# Patient Record
Sex: FEMALE | Race: BLACK OR AFRICAN AMERICAN | NOT HISPANIC OR LATINO
[De-identification: names, ages, dates, MRNs, and addresses within clinical notes are randomized per-mention and may not be internally consistent; named-entity substitution may affect disease eponyms.]

---

## 2021-05-07 PROBLEM — Z00.00 ENCOUNTER FOR PREVENTIVE HEALTH EXAMINATION: Status: ACTIVE | Noted: 2021-05-07

## 2021-05-10 ENCOUNTER — NON-APPOINTMENT (OUTPATIENT)
Age: 81
End: 2021-05-10

## 2021-05-11 ENCOUNTER — NON-APPOINTMENT (OUTPATIENT)
Age: 81
End: 2021-05-11

## 2021-05-11 ENCOUNTER — APPOINTMENT (OUTPATIENT)
Dept: GYNECOLOGIC ONCOLOGY | Facility: CLINIC | Age: 81
End: 2021-05-11
Payer: MEDICARE

## 2021-05-11 VITALS
HEART RATE: 77 BPM | BODY MASS INDEX: 27.97 KG/M2 | HEIGHT: 66 IN | SYSTOLIC BLOOD PRESSURE: 162 MMHG | WEIGHT: 174 LBS | DIASTOLIC BLOOD PRESSURE: 79 MMHG

## 2021-05-11 DIAGNOSIS — E11.9 TYPE 2 DIABETES MELLITUS W/OUT COMPLICATIONS: ICD-10-CM

## 2021-05-11 DIAGNOSIS — N89.8 OTHER SPECIFIED NONINFLAMMATORY DISORDERS OF VAGINA: ICD-10-CM

## 2021-05-11 DIAGNOSIS — E03.9 HYPOTHYROIDISM, UNSPECIFIED: ICD-10-CM

## 2021-05-11 DIAGNOSIS — Z78.9 OTHER SPECIFIED HEALTH STATUS: ICD-10-CM

## 2021-05-11 PROCEDURE — 99205 OFFICE O/P NEW HI 60 MIN: CPT

## 2021-05-11 PROCEDURE — 99072 ADDL SUPL MATRL&STAF TM PHE: CPT

## 2021-05-11 RX ORDER — METFORMIN HYDROCHLORIDE 625 MG/1
TABLET ORAL
Refills: 0 | Status: ACTIVE | COMMUNITY

## 2021-05-11 RX ORDER — ATORVASTATIN CALCIUM 80 MG/1
TABLET, FILM COATED ORAL
Refills: 0 | Status: ACTIVE | COMMUNITY

## 2021-05-11 RX ORDER — LEVOTHYROXINE SODIUM 137 UG/1
TABLET ORAL
Refills: 0 | Status: ACTIVE | COMMUNITY

## 2021-05-11 NOTE — DISCUSSION/SUMMARY
[Reviewed Clinical Lab Test(s)] : Results of clinical tests were reviewed. [Reviewed Radiology Report(s)] : Radiology reports were reviewed. [Discuss Tests w/Referring Providers] : Results of labs/radiology studies and the treatment recommendations were discussed with performing/referring physician. [Pre Op] : The differential diagnosis was discussed in detail. The indications, risks, benefits and alternatives were discussed. [unfilled] expressed an understanding of the treatment rationale and her questions were answered to her apparent satisfaction.

## 2021-05-11 NOTE — PHYSICAL EXAM
[Normal] : Recto-Vaginal Exam: Normal [Fully active, able to carry on all pre-disease performance without restriction] : Status 0 - Fully active, able to carry on all pre-disease performance without restriction [de-identified] : small laparoscopic scars; vertical 2 cm scar at suprapubic region [de-identified] : b/l upper extremity tattoos [de-identified] : watery blood tinged fluid pooling in posterior fornix (aspirated to confirm fluid creat c/w serum) [de-identified] : enlarged fibroid uterus 12 weeks [de-identified] : no rectovaginal nodularity

## 2021-05-11 NOTE — REVIEW OF SYSTEMS
[Negative] : Musculoskeletal [Diabetes] : diabetes mellitus [Hypothyroidism] : hypothyroidism [Abn Vag Bleeding] : abnormal vaginal bleeding [de-identified] : HTN

## 2021-05-11 NOTE — HISTORY OF PRESENT ILLNESS
[FreeTextEntry1] : Referred by (GYN) Dr. Gary\par PCP: Dr. Raymond\par \par Ms. Cleveland is a 80 year old  postmenopausal female, referred by Dr. 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 She denies pelvic pain/pressure. She denies bloating, abdominal distention or change in bowel or urinary habits.  She denies nausea/vomiting.  She has vaginal bleeding, but denies vaginal discharge.  She denies all other associated signs and symptoms.  She is here today to discuss further surgical management. \par \par HM\par Pap- n/a \par Mammo- n/a\par Colonoscopy- n/a

## 2021-05-11 NOTE — OB HISTORY
[Total Preg ___] : : [unfilled] [Full Term ___] : [unfilled] (full-term) [Abortions ___] : [unfilled] (abortions) [Living ___] : [unfilled] (living) [Vaginal ___] : [unfilled] vaginal delivery(s) [Menarche Age ____] : age at menarche was [unfilled] [Menopause  Age ____] : menopause occurred at age [unfilled]

## 2021-05-12 LAB
CANCER AG125 SERPL-ACNC: 10 U/ML
CEA SERPL-MCNC: 1.2 NG/ML
CREAT FLD-MCNC: 0.95 MG/DL

## 2021-05-13 ENCOUNTER — RESULT REVIEW (OUTPATIENT)
Age: 81
End: 2021-05-13

## 2021-05-13 ENCOUNTER — TRANSCRIPTION ENCOUNTER (OUTPATIENT)
Age: 81
End: 2021-05-13

## 2021-05-15 ENCOUNTER — APPOINTMENT (OUTPATIENT)
Dept: CT IMAGING | Facility: IMAGING CENTER | Age: 81
End: 2021-05-15
Payer: MEDICARE

## 2021-05-15 ENCOUNTER — OUTPATIENT (OUTPATIENT)
Dept: OUTPATIENT SERVICES | Facility: HOSPITAL | Age: 81
LOS: 1 days | End: 2021-05-15
Payer: COMMERCIAL

## 2021-05-15 DIAGNOSIS — N89.8 OTHER SPECIFIED NONINFLAMMATORY DISORDERS OF VAGINA: ICD-10-CM

## 2021-05-15 DIAGNOSIS — C54.1 MALIGNANT NEOPLASM OF ENDOMETRIUM: ICD-10-CM

## 2021-05-15 PROCEDURE — 82565 ASSAY OF CREATININE: CPT

## 2021-05-15 PROCEDURE — 71260 CT THORAX DX C+: CPT

## 2021-05-15 PROCEDURE — 74177 CT ABD & PELVIS W/CONTRAST: CPT | Mod: 26,MG

## 2021-05-15 PROCEDURE — 74177 CT ABD & PELVIS W/CONTRAST: CPT

## 2021-05-15 PROCEDURE — G1004: CPT

## 2021-05-15 PROCEDURE — 71260 CT THORAX DX C+: CPT | Mod: 26,MG

## 2021-05-17 ENCOUNTER — APPOINTMENT (OUTPATIENT)
Dept: SURGICAL ONCOLOGY | Facility: CLINIC | Age: 81
End: 2021-05-17
Payer: MEDICARE

## 2021-05-17 ENCOUNTER — OUTPATIENT (OUTPATIENT)
Dept: OUTPATIENT SERVICES | Facility: HOSPITAL | Age: 81
LOS: 1 days | End: 2021-05-17
Payer: MEDICARE

## 2021-05-17 ENCOUNTER — LABORATORY RESULT (OUTPATIENT)
Age: 81
End: 2021-05-17

## 2021-05-17 ENCOUNTER — APPOINTMENT (OUTPATIENT)
Dept: DISASTER EMERGENCY | Facility: CLINIC | Age: 81
End: 2021-05-17

## 2021-05-17 VITALS
HEIGHT: 67 IN | HEART RATE: 62 BPM | DIASTOLIC BLOOD PRESSURE: 80 MMHG | TEMPERATURE: 98 F | SYSTOLIC BLOOD PRESSURE: 140 MMHG | RESPIRATION RATE: 16 BRPM | WEIGHT: 169.98 LBS | OXYGEN SATURATION: 98 %

## 2021-05-17 DIAGNOSIS — C54.1 MALIGNANT NEOPLASM OF ENDOMETRIUM: ICD-10-CM

## 2021-05-17 DIAGNOSIS — E03.9 HYPOTHYROIDISM, UNSPECIFIED: ICD-10-CM

## 2021-05-17 DIAGNOSIS — E11.9 TYPE 2 DIABETES MELLITUS WITHOUT COMPLICATIONS: ICD-10-CM

## 2021-05-17 DIAGNOSIS — I10 ESSENTIAL (PRIMARY) HYPERTENSION: ICD-10-CM

## 2021-05-17 DIAGNOSIS — Z98.51 TUBAL LIGATION STATUS: Chronic | ICD-10-CM

## 2021-05-17 LAB
A1C WITH ESTIMATED AVERAGE GLUCOSE RESULT: 6.7 % — HIGH (ref 4–5.6)
ALBUMIN SERPL ELPH-MCNC: 3.9 G/DL — SIGNIFICANT CHANGE UP (ref 3.3–5)
ALP SERPL-CCNC: 135 U/L — HIGH (ref 40–120)
ALT FLD-CCNC: 9 U/L — SIGNIFICANT CHANGE UP (ref 4–33)
ANION GAP SERPL CALC-SCNC: 10 MMOL/L — SIGNIFICANT CHANGE UP (ref 7–14)
AST SERPL-CCNC: 16 U/L — SIGNIFICANT CHANGE UP (ref 4–32)
BILIRUB SERPL-MCNC: 0.6 MG/DL — SIGNIFICANT CHANGE UP (ref 0.2–1.2)
BLD GP AB SCN SERPL QL: NEGATIVE — SIGNIFICANT CHANGE UP
BUN SERPL-MCNC: 10 MG/DL — SIGNIFICANT CHANGE UP (ref 7–23)
CALCIUM SERPL-MCNC: 10.2 MG/DL — SIGNIFICANT CHANGE UP (ref 8.4–10.5)
CHLORIDE SERPL-SCNC: 101 MMOL/L — SIGNIFICANT CHANGE UP (ref 98–107)
CO2 SERPL-SCNC: 28 MMOL/L — SIGNIFICANT CHANGE UP (ref 22–31)
CREAT SERPL-MCNC: 0.94 MG/DL — SIGNIFICANT CHANGE UP (ref 0.5–1.3)
ESTIMATED AVERAGE GLUCOSE: 146 MG/DL — HIGH (ref 68–114)
GLUCOSE SERPL-MCNC: 132 MG/DL — HIGH (ref 70–99)
HCT VFR BLD CALC: 43 % — SIGNIFICANT CHANGE UP (ref 34.5–45)
HGB BLD-MCNC: 13 G/DL — SIGNIFICANT CHANGE UP (ref 11.5–15.5)
MCHC RBC-ENTMCNC: 26.5 PG — LOW (ref 27–34)
MCHC RBC-ENTMCNC: 30.2 GM/DL — LOW (ref 32–36)
MCV RBC AUTO: 87.8 FL — SIGNIFICANT CHANGE UP (ref 80–100)
NRBC # BLD: 0 /100 WBCS — SIGNIFICANT CHANGE UP
NRBC # FLD: 0 K/UL — SIGNIFICANT CHANGE UP
PLATELET # BLD AUTO: 377 K/UL — SIGNIFICANT CHANGE UP (ref 150–400)
POTASSIUM SERPL-MCNC: 3.6 MMOL/L — SIGNIFICANT CHANGE UP (ref 3.5–5.3)
POTASSIUM SERPL-SCNC: 3.6 MMOL/L — SIGNIFICANT CHANGE UP (ref 3.5–5.3)
PROT SERPL-MCNC: 7.8 G/DL — SIGNIFICANT CHANGE UP (ref 6–8.3)
RBC # BLD: 4.9 M/UL — SIGNIFICANT CHANGE UP (ref 3.8–5.2)
RBC # FLD: 14 % — SIGNIFICANT CHANGE UP (ref 10.3–14.5)
RH IG SCN BLD-IMP: POSITIVE — SIGNIFICANT CHANGE UP
SODIUM SERPL-SCNC: 139 MMOL/L — SIGNIFICANT CHANGE UP (ref 135–145)
WBC # BLD: 8.07 K/UL — SIGNIFICANT CHANGE UP (ref 3.8–10.5)
WBC # FLD AUTO: 8.07 K/UL — SIGNIFICANT CHANGE UP (ref 3.8–10.5)

## 2021-05-17 PROCEDURE — 99443: CPT | Mod: 95

## 2021-05-17 PROCEDURE — 93010 ELECTROCARDIOGRAM REPORT: CPT

## 2021-05-17 RX ORDER — SODIUM CHLORIDE 9 MG/ML
1000 INJECTION, SOLUTION INTRAVENOUS
Refills: 0 | Status: DISCONTINUED | OUTPATIENT
Start: 2021-05-20 | End: 2021-05-20

## 2021-05-17 RX ORDER — CHLORHEXIDINE GLUCONATE 213 G/1000ML
1 SOLUTION TOPICAL ONCE
Refills: 0 | Status: COMPLETED | OUTPATIENT
Start: 2021-05-20 | End: 2021-05-20

## 2021-05-17 NOTE — H&P PST ADULT - HISTORY OF PRESENT ILLNESS
80 y.o .female , LMP 48 y.o., c/o postmenopausal bleeding since 2021, denies pelvic pain, cramping, s/p transvaginal ultrasound, preop diagnosis malignant neoplasm of endometrium, scheduled for robotic assisted total laparoscopic hysterectomy, bilateral salpingo oophorectomy, sentinel lymph node mapping with pelvic papa aortic lymph node sampling, omental biopsy, possible open 80 y.o .female , LMP 48 y.o., c/o postmenopausal bleeding since 2021, weight loss 10 lbs, denies pelvic pain, cramping, fatigue, s/p transvaginal ultrasound, MRI, biopsy, preop diagnosis malignant neoplasm of endometrium, scheduled for robotic assisted total laparoscopic hysterectomy, bilateral salpingo oophorectomy, sentinel lymph node mapping with pelvic papa aortic lymph node sampling, omental biopsy, possible open

## 2021-05-17 NOTE — H&P PST ADULT - NSICDXFAMILYHX_GEN_ALL_CORE_FT
FAMILY HISTORY:  Sibling  Still living? Unknown  FH: HTN (hypertension), Age at diagnosis: Age Unknown

## 2021-05-17 NOTE — H&P PST ADULT - ATTENDING COMMENTS
Workup of endometrial cancer demonstrated a large ascending colon mass on CT.  Colonoscopy confirmed a malignant appearing mass in the ascending colon - tattooed distally by GI.  Biopsy demonstrates a tubular adenoma, too large to be endoscopically removed.  Given large size and increased risk of malignant transformation, possible occult cancer, partial colectomy is indicated for complete extirpation and regional ally sampling in the event of cancer diagnosis.  Will plan combined procedure today with Dr. Cox.  Operative indications, risks/benefits explained to patient.  She expresses understanding and has signed informed consent.

## 2021-05-17 NOTE — H&P PST ADULT - NSICDXPASTMEDICALHX_GEN_ALL_CORE_FT
PAST MEDICAL HISTORY:  HLD (hyperlipidemia)     HTN (hypertension)     Hypothyroidism     Malignant neoplasm of endometrium      PAST MEDICAL HISTORY:  Diabetes mellitus     HLD (hyperlipidemia)     HTN (hypertension)     Hypothyroidism     Malignant neoplasm of endometrium     Thyroid nodule

## 2021-05-17 NOTE — H&P PST ADULT - NSICDXPROBLEM_GEN_ALL_CORE_FT
PROBLEM DIAGNOSES  Problem: Malignant neoplasm of endometrium  Assessment and Plan: pt scheduled for robotic assisted total laparoscopic hysterectomy, bilateral salpingo oophorectomy, sentinel lymph node mapping with pelvic papa aortic lymph node sampling, omental biopsy, possible open on 05/20/21  Preop instructions provided. Pt verbalized understanding.   Pepcid for GI prophylaxis with written and verbal instruction provided    written and verbal instructions with teach back on chlorhexidine shampoo provided,  pt verbalized understanding with returned demonstration   s/p med eval as per surgeon's request, copy requested  pt confirmed has appt for COVID test scheduled 3 days preop    Problem: HTN (hypertension)  Assessment and Plan: pt instructed to take toprol, norvasc with a sip of water on the morning of the surgery     Problem: Hypothyroidism  Assessment and Plan: pt instructed to take levothyroxine with a sip of water on the morning of the surgery        PROBLEM DIAGNOSES  Problem: Malignant neoplasm of endometrium  Assessment and Plan: pt scheduled for robotic assisted total laparoscopic hysterectomy, bilateral salpingo oophorectomy, sentinel lymph node mapping with pelvic papa aortic lymph node sampling, omental biopsy, possible open on 05/20/21  Preop instructions provided. Pt verbalized understanding.   Pepcid for GI prophylaxis with written and verbal instruction provided    written and verbal instructions with teach back on chlorhexidine shampoo provided,  pt verbalized understanding with returned demonstration   s/p med eval as per surgeon's request, copy requested  pt confirmed has appt for COVID test scheduled 3 days preop    Problem: HTN (hypertension)  Assessment and Plan: pt instructed to take toprol, norvasc with a sip of water on the morning of the surgery     Problem: Hypothyroidism  Assessment and Plan: pt instructed to take levothyroxine with a sip of water on the morning of the surgery     Problem: Diabetes mellitus  Assessment and Plan: OR booking notified  pt instructed to hold metformin the night before and on the morning of the surgery

## 2021-05-17 NOTE — H&P PST ADULT - NSANTHOSAYNRD_GEN_A_CORE
No. MAGO screening performed.  STOP BANG Legend: 0-2 = LOW Risk; 3-4 = INTERMEDIATE Risk; 5-8 = HIGH Risk

## 2021-05-17 NOTE — H&P PST ADULT - LIVES WITH, PROFILE
Spoke to Sallie with [facility] and requesting pt's medication for Furosemide 40mg 2 tabs twice daily changed to Furosemide 80mg BID. Pls advise.    alone

## 2021-05-18 ENCOUNTER — RESULT REVIEW (OUTPATIENT)
Age: 81
End: 2021-05-18

## 2021-05-18 ENCOUNTER — OUTPATIENT (OUTPATIENT)
Dept: OUTPATIENT SERVICES | Facility: HOSPITAL | Age: 81
LOS: 1 days | End: 2021-05-18
Payer: COMMERCIAL

## 2021-05-18 DIAGNOSIS — Z98.51 TUBAL LIGATION STATUS: Chronic | ICD-10-CM

## 2021-05-18 DIAGNOSIS — C80.1 MALIGNANT (PRIMARY) NEOPLASM, UNSPECIFIED: ICD-10-CM

## 2021-05-18 PROBLEM — C54.1 MALIGNANT NEOPLASM OF ENDOMETRIUM: Chronic | Status: ACTIVE | Noted: 2021-05-17

## 2021-05-18 PROBLEM — I10 ESSENTIAL (PRIMARY) HYPERTENSION: Chronic | Status: ACTIVE | Noted: 2021-05-17

## 2021-05-18 PROBLEM — E03.9 HYPOTHYROIDISM, UNSPECIFIED: Chronic | Status: ACTIVE | Noted: 2021-05-17

## 2021-05-18 PROBLEM — E78.5 HYPERLIPIDEMIA, UNSPECIFIED: Chronic | Status: ACTIVE | Noted: 2021-05-17

## 2021-05-18 PROBLEM — E04.1 NONTOXIC SINGLE THYROID NODULE: Chronic | Status: ACTIVE | Noted: 2021-05-17

## 2021-05-18 PROBLEM — E11.9 TYPE 2 DIABETES MELLITUS WITHOUT COMPLICATIONS: Chronic | Status: ACTIVE | Noted: 2021-05-17

## 2021-05-18 PROCEDURE — 88321 CONSLTJ&REPRT SLD PREP ELSWR: CPT

## 2021-05-19 ENCOUNTER — NON-APPOINTMENT (OUTPATIENT)
Age: 81
End: 2021-05-19

## 2021-05-19 ENCOUNTER — FORM ENCOUNTER (OUTPATIENT)
Age: 81
End: 2021-05-19

## 2021-05-19 ENCOUNTER — TRANSCRIPTION ENCOUNTER (OUTPATIENT)
Age: 81
End: 2021-05-19

## 2021-05-19 LAB — SURGICAL PATHOLOGY STUDY: SIGNIFICANT CHANGE UP

## 2021-05-19 NOTE — ASU PATIENT PROFILE, ADULT - PMH
Diabetes mellitus    HLD (hyperlipidemia)    HTN (hypertension)    Hypothyroidism    Malignant neoplasm of endometrium    Thyroid nodule

## 2021-05-20 ENCOUNTER — RESULT REVIEW (OUTPATIENT)
Age: 81
End: 2021-05-20

## 2021-05-20 ENCOUNTER — INPATIENT (INPATIENT)
Facility: HOSPITAL | Age: 81
LOS: 2 days | Discharge: HOME | End: 2021-05-23
Attending: OBSTETRICS & GYNECOLOGY | Admitting: OBSTETRICS & GYNECOLOGY
Payer: MEDICARE

## 2021-05-20 ENCOUNTER — APPOINTMENT (OUTPATIENT)
Dept: SURGICAL ONCOLOGY | Facility: HOSPITAL | Age: 81
End: 2021-05-20

## 2021-05-20 ENCOUNTER — APPOINTMENT (OUTPATIENT)
Dept: GYNECOLOGIC ONCOLOGY | Facility: HOSPITAL | Age: 81
End: 2021-05-20

## 2021-05-20 VITALS
WEIGHT: 169.98 LBS | HEIGHT: 67 IN | TEMPERATURE: 99 F | DIASTOLIC BLOOD PRESSURE: 59 MMHG | SYSTOLIC BLOOD PRESSURE: 133 MMHG | HEART RATE: 61 BPM | OXYGEN SATURATION: 99 % | RESPIRATION RATE: 14 BRPM

## 2021-05-20 DIAGNOSIS — Z98.51 TUBAL LIGATION STATUS: Chronic | ICD-10-CM

## 2021-05-20 DIAGNOSIS — C54.1 MALIGNANT NEOPLASM OF ENDOMETRIUM: ICD-10-CM

## 2021-05-20 LAB
ANION GAP SERPL CALC-SCNC: 11 MMOL/L — SIGNIFICANT CHANGE UP (ref 7–14)
BASOPHILS # BLD AUTO: 0.03 K/UL — SIGNIFICANT CHANGE UP (ref 0–0.2)
BASOPHILS NFR BLD AUTO: 0.2 % — SIGNIFICANT CHANGE UP (ref 0–2)
BUN SERPL-MCNC: 8 MG/DL — SIGNIFICANT CHANGE UP (ref 7–23)
CALCIUM SERPL-MCNC: 10 MG/DL — SIGNIFICANT CHANGE UP (ref 8.4–10.5)
CHLORIDE SERPL-SCNC: 103 MMOL/L — SIGNIFICANT CHANGE UP (ref 98–107)
CO2 SERPL-SCNC: 26 MMOL/L — SIGNIFICANT CHANGE UP (ref 22–31)
CREAT SERPL-MCNC: 0.82 MG/DL — SIGNIFICANT CHANGE UP (ref 0.5–1.3)
EOSINOPHIL # BLD AUTO: 0 K/UL — SIGNIFICANT CHANGE UP (ref 0–0.5)
EOSINOPHIL NFR BLD AUTO: 0 % — SIGNIFICANT CHANGE UP (ref 0–6)
GLUCOSE BLDC GLUCOMTR-MCNC: 109 MG/DL — HIGH (ref 70–99)
GLUCOSE BLDC GLUCOMTR-MCNC: 140 MG/DL — HIGH (ref 70–99)
GLUCOSE BLDC GLUCOMTR-MCNC: 142 MG/DL — HIGH (ref 70–99)
GLUCOSE SERPL-MCNC: 133 MG/DL — HIGH (ref 70–99)
HCT VFR BLD CALC: 41.3 % — SIGNIFICANT CHANGE UP (ref 34.5–45)
HGB BLD-MCNC: 12.3 G/DL — SIGNIFICANT CHANGE UP (ref 11.5–15.5)
IANC: 12.71 K/UL — HIGH (ref 1.5–8.5)
IMM GRANULOCYTES NFR BLD AUTO: 0.3 % — SIGNIFICANT CHANGE UP (ref 0–1.5)
LYMPHOCYTES # BLD AUTO: 1.19 K/UL — SIGNIFICANT CHANGE UP (ref 1–3.3)
LYMPHOCYTES # BLD AUTO: 8 % — LOW (ref 13–44)
MAGNESIUM SERPL-MCNC: 2 MG/DL — SIGNIFICANT CHANGE UP (ref 1.6–2.6)
MCHC RBC-ENTMCNC: 26.7 PG — LOW (ref 27–34)
MCHC RBC-ENTMCNC: 29.8 GM/DL — LOW (ref 32–36)
MCV RBC AUTO: 89.8 FL — SIGNIFICANT CHANGE UP (ref 80–100)
MONOCYTES # BLD AUTO: 0.88 K/UL — SIGNIFICANT CHANGE UP (ref 0–0.9)
MONOCYTES NFR BLD AUTO: 5.9 % — SIGNIFICANT CHANGE UP (ref 2–14)
NEUTROPHILS # BLD AUTO: 12.71 K/UL — HIGH (ref 1.8–7.4)
NEUTROPHILS NFR BLD AUTO: 85.6 % — HIGH (ref 43–77)
NRBC # BLD: 0 /100 WBCS — SIGNIFICANT CHANGE UP
NRBC # FLD: 0 K/UL — SIGNIFICANT CHANGE UP
PHOSPHATE SERPL-MCNC: 3.7 MG/DL — SIGNIFICANT CHANGE UP (ref 2.5–4.5)
PLATELET # BLD AUTO: 341 K/UL — SIGNIFICANT CHANGE UP (ref 150–400)
POTASSIUM SERPL-MCNC: 4.4 MMOL/L — SIGNIFICANT CHANGE UP (ref 3.5–5.3)
POTASSIUM SERPL-SCNC: 4.4 MMOL/L — SIGNIFICANT CHANGE UP (ref 3.5–5.3)
RBC # BLD: 4.6 M/UL — SIGNIFICANT CHANGE UP (ref 3.8–5.2)
RBC # FLD: 13.8 % — SIGNIFICANT CHANGE UP (ref 10.3–14.5)
RH IG SCN BLD-IMP: POSITIVE — SIGNIFICANT CHANGE UP
SODIUM SERPL-SCNC: 140 MMOL/L — SIGNIFICANT CHANGE UP (ref 135–145)
WBC # BLD: 14.85 K/UL — HIGH (ref 3.8–10.5)
WBC # FLD AUTO: 14.85 K/UL — HIGH (ref 3.8–10.5)

## 2021-05-20 PROCEDURE — S2900 ROBOTIC SURGICAL SYSTEM: CPT | Mod: NC

## 2021-05-20 PROCEDURE — 88307 TISSUE EXAM BY PATHOLOGIST: CPT | Mod: 26

## 2021-05-20 PROCEDURE — 88309 TISSUE EXAM BY PATHOLOGIST: CPT | Mod: 26

## 2021-05-20 PROCEDURE — 88305 TISSUE EXAM BY PATHOLOGIST: CPT | Mod: 26

## 2021-05-20 PROCEDURE — 38570 LAPAROSCOPY LYMPH NODE BIOP: CPT | Mod: GC

## 2021-05-20 PROCEDURE — 38900 IO MAP OF SENT LYMPH NODE: CPT | Mod: 50

## 2021-05-20 PROCEDURE — 88311 DECALCIFY TISSUE: CPT | Mod: 26

## 2021-05-20 PROCEDURE — 88112 CYTOPATH CELL ENHANCE TECH: CPT | Mod: 26

## 2021-05-20 PROCEDURE — 49905 OMENTAL FLAP INTRA-ABDOM: CPT

## 2021-05-20 PROCEDURE — 58573 TLH W/T/O UTERUS OVER 250 G: CPT | Mod: GC

## 2021-05-20 PROCEDURE — 88360 TUMOR IMMUNOHISTOCHEM/MANUAL: CPT | Mod: 26

## 2021-05-20 PROCEDURE — 44204 LAPARO PARTIAL COLECTOMY: CPT

## 2021-05-20 PROCEDURE — 88342 IMHCHEM/IMCYTCHM 1ST ANTB: CPT | Mod: 26,59

## 2021-05-20 PROCEDURE — 88341 IMHCHEM/IMCYTCHM EA ADD ANTB: CPT | Mod: 26,59

## 2021-05-20 RX ORDER — SODIUM CHLORIDE 9 MG/ML
1000 INJECTION, SOLUTION INTRAVENOUS
Refills: 0 | Status: DISCONTINUED | OUTPATIENT
Start: 2021-05-20 | End: 2021-05-20

## 2021-05-20 RX ORDER — HEPARIN SODIUM 5000 [USP'U]/ML
5000 INJECTION INTRAVENOUS; SUBCUTANEOUS EVERY 8 HOURS
Refills: 0 | Status: DISCONTINUED | OUTPATIENT
Start: 2021-05-20 | End: 2021-05-21

## 2021-05-20 RX ORDER — SODIUM CHLORIDE 9 MG/ML
1000 INJECTION, SOLUTION INTRAVENOUS
Refills: 0 | Status: DISCONTINUED | OUTPATIENT
Start: 2021-05-20 | End: 2021-05-22

## 2021-05-20 RX ORDER — ACETAMINOPHEN 500 MG
1000 TABLET ORAL ONCE
Refills: 0 | Status: COMPLETED | OUTPATIENT
Start: 2021-05-21 | End: 2021-05-21

## 2021-05-20 RX ORDER — DEXTROSE 50 % IN WATER 50 %
15 SYRINGE (ML) INTRAVENOUS ONCE
Refills: 0 | Status: DISCONTINUED | OUTPATIENT
Start: 2021-05-20 | End: 2021-05-20

## 2021-05-20 RX ORDER — INSULIN LISPRO 100/ML
VIAL (ML) SUBCUTANEOUS AT BEDTIME
Refills: 0 | Status: DISCONTINUED | OUTPATIENT
Start: 2021-05-20 | End: 2021-05-20

## 2021-05-20 RX ORDER — ACETAMINOPHEN 500 MG
1000 TABLET ORAL ONCE
Refills: 0 | Status: DISCONTINUED | OUTPATIENT
Start: 2021-05-21 | End: 2021-05-21

## 2021-05-20 RX ORDER — DEXTROSE 50 % IN WATER 50 %
15 SYRINGE (ML) INTRAVENOUS ONCE
Refills: 0 | Status: DISCONTINUED | OUTPATIENT
Start: 2021-05-20 | End: 2021-05-22

## 2021-05-20 RX ORDER — HYDROMORPHONE HYDROCHLORIDE 2 MG/ML
0.5 INJECTION INTRAMUSCULAR; INTRAVENOUS; SUBCUTANEOUS
Refills: 0 | Status: DISCONTINUED | OUTPATIENT
Start: 2021-05-20 | End: 2021-05-20

## 2021-05-20 RX ORDER — ONDANSETRON 8 MG/1
4 TABLET, FILM COATED ORAL EVERY 8 HOURS
Refills: 0 | Status: DISCONTINUED | OUTPATIENT
Start: 2021-05-20 | End: 2021-05-21

## 2021-05-20 RX ORDER — INSULIN LISPRO 100/ML
VIAL (ML) SUBCUTANEOUS EVERY 6 HOURS
Refills: 0 | Status: DISCONTINUED | OUTPATIENT
Start: 2021-05-20 | End: 2021-05-22

## 2021-05-20 RX ORDER — GLUCAGON INJECTION, SOLUTION 0.5 MG/.1ML
1 INJECTION, SOLUTION SUBCUTANEOUS ONCE
Refills: 0 | Status: DISCONTINUED | OUTPATIENT
Start: 2021-05-20 | End: 2021-05-20

## 2021-05-20 RX ORDER — GLUCAGON INJECTION, SOLUTION 0.5 MG/.1ML
1 INJECTION, SOLUTION SUBCUTANEOUS ONCE
Refills: 0 | Status: DISCONTINUED | OUTPATIENT
Start: 2021-05-20 | End: 2021-05-22

## 2021-05-20 RX ORDER — DEXTROSE 50 % IN WATER 50 %
25 SYRINGE (ML) INTRAVENOUS ONCE
Refills: 0 | Status: DISCONTINUED | OUTPATIENT
Start: 2021-05-20 | End: 2021-05-20

## 2021-05-20 RX ORDER — METOPROLOL TARTRATE 50 MG
5 TABLET ORAL EVERY 6 HOURS
Refills: 0 | Status: DISCONTINUED | OUTPATIENT
Start: 2021-05-20 | End: 2021-05-21

## 2021-05-20 RX ORDER — DEXTROSE 50 % IN WATER 50 %
12.5 SYRINGE (ML) INTRAVENOUS ONCE
Refills: 0 | Status: DISCONTINUED | OUTPATIENT
Start: 2021-05-20 | End: 2021-05-22

## 2021-05-20 RX ORDER — PANTOPRAZOLE SODIUM 20 MG/1
40 TABLET, DELAYED RELEASE ORAL ONCE
Refills: 0 | Status: COMPLETED | OUTPATIENT
Start: 2021-05-20 | End: 2021-05-20

## 2021-05-20 RX ORDER — DEXTROSE 50 % IN WATER 50 %
25 SYRINGE (ML) INTRAVENOUS ONCE
Refills: 0 | Status: DISCONTINUED | OUTPATIENT
Start: 2021-05-20 | End: 2021-05-22

## 2021-05-20 RX ORDER — INSULIN LISPRO 100/ML
VIAL (ML) SUBCUTANEOUS
Refills: 0 | Status: DISCONTINUED | OUTPATIENT
Start: 2021-05-20 | End: 2021-05-20

## 2021-05-20 RX ORDER — DEXTROSE 50 % IN WATER 50 %
12.5 SYRINGE (ML) INTRAVENOUS ONCE
Refills: 0 | Status: DISCONTINUED | OUTPATIENT
Start: 2021-05-20 | End: 2021-05-20

## 2021-05-20 RX ORDER — ACETAMINOPHEN 500 MG
1000 TABLET ORAL ONCE
Refills: 0 | Status: COMPLETED | OUTPATIENT
Start: 2021-05-20 | End: 2021-05-20

## 2021-05-20 RX ORDER — ONDANSETRON 8 MG/1
4 TABLET, FILM COATED ORAL
Refills: 0 | Status: DISCONTINUED | OUTPATIENT
Start: 2021-05-20 | End: 2021-05-20

## 2021-05-20 RX ORDER — SODIUM CHLORIDE 9 MG/ML
1000 INJECTION, SOLUTION INTRAVENOUS
Refills: 0 | Status: DISCONTINUED | OUTPATIENT
Start: 2021-05-20 | End: 2021-05-21

## 2021-05-20 RX ADMIN — ONDANSETRON 4 MILLIGRAM(S): 8 TABLET, FILM COATED ORAL at 22:16

## 2021-05-20 RX ADMIN — SODIUM CHLORIDE 125 MILLILITER(S): 9 INJECTION, SOLUTION INTRAVENOUS at 17:02

## 2021-05-20 RX ADMIN — PANTOPRAZOLE SODIUM 40 MILLIGRAM(S): 20 TABLET, DELAYED RELEASE ORAL at 17:00

## 2021-05-20 RX ADMIN — SODIUM CHLORIDE 125 MILLILITER(S): 9 INJECTION, SOLUTION INTRAVENOUS at 22:16

## 2021-05-20 RX ADMIN — HEPARIN SODIUM 5000 UNIT(S): 5000 INJECTION INTRAVENOUS; SUBCUTANEOUS at 17:00

## 2021-05-20 RX ADMIN — Medication 400 MILLIGRAM(S): at 21:07

## 2021-05-20 NOTE — ASU PREOP CHECKLIST - SELECT TESTS ORDERED
142/CBC/CMP/Type and Cross/Type and Screen/EKG/POCT Blood Glucose 142/CBC/CMP/Type and Cross/Type and Screen/EKG/POCT Blood Glucose/COVID-19

## 2021-05-20 NOTE — BRIEF OPERATIVE NOTE - OPERATION/FINDINGS
EUA: mobile enlarged fibroid uterus, nonpalpable adnexa b/l, normal rectovaginal exam w/o nodularity  LSC: hemostatic entry site, upper abdominal survey with normal appearing liver edge, falciform, omentum; normal appearing appendix; pelvic survey with normal ovaries and fallopian tubes bilaterally; tumor implant noted on posterior uterine wall; enlarged left common iliac lymph nodes; right obturator lymph node malignant EUA: mobile enlarged fibroid uterus, nonpalpable adnexa b/l, normal rectovaginal exam w/o nodularity  LSC: hemostatic entry site, upper abdominal survey with normal appearing liver edge, falciform, omentum; normal appearing appendix; pelvic survey with normal ovaries and fallopian tubes bilaterally; tumor implant noted on posterior uterine wall; enlarged left common iliac lymph nodes; right obturator lymph node malignant  RA right hemicolectomy w/Dr. Reaves 2/2 right colonic mass  Mini-Pfannenstiel for removal of fibroid and right colon

## 2021-05-20 NOTE — CHART NOTE - NSCHARTNOTEFT_GEN_A_CORE
PROCEDURE: Robot assisted right hemicolectomy    SUBJECTIVE:   Pt seen and examined at bedside. Pt s/p robot assisted RHC (and MAGO BSO by gyn onc). Pt tolerated the procedure well. Pt laying in bed comfortably. Pt NPO. No nausea, vomiting, chest pain, SOB, fever, chills. Renae in place      Vital Signs Last 24 Hrs  T(C): 36.5 (20 May 2021 20:45), Max: 37.1 (20 May 2021 06:22)  T(F): 97.7 (20 May 2021 20:45), Max: 98.8 (20 May 2021 06:22)  HR: 59 (20 May 2021 20:45) (50 - 68)  BP: 144/57 (20 May 2021 18:40) (114/52 - 144/57)  BP(mean): 71 (20 May 2021 17:00) (67 - 77)  RR: 16 (20 May 2021 20:45) (11 - 22)  SpO2: 98% (20 May 2021 20:45) (92% - 99%)      PHYSICAL EXAM:  Constitutional: NAD, laying in bed  Neuro: AAOx3  Respiratory: breathing comfortably  Gastrointestinal: Abdomen soft, non distended, appropriately tender, dressing c/d/i, binder in place  : batool        I&O's Summary    20 May 2021 07:01  -  20 May 2021 20:57  --------------------------------------------------------  IN: 500 mL / OUT: 160 mL / NET: 340 mL      I&O's Detail    20 May 2021 07:01  -  20 May 2021 20:57  --------------------------------------------------------  IN:    Lactated Ringers: 500 mL  Total IN: 500 mL    OUT:    Indwelling Catheter - Urethral (mL): 160 mL  Total OUT: 160 mL    Total NET: 340 mL          MEDICATIONS  (STANDING):  acetaminophen  IVPB .. 1000 milliGRAM(s) IV Intermittent once  dextrose 40% Gel 15 Gram(s) Oral once  dextrose 5%. 1000 milliLiter(s) (50 mL/Hr) IV Continuous <Continuous>  dextrose 5%. 1000 milliLiter(s) (100 mL/Hr) IV Continuous <Continuous>  dextrose 50% Injectable 25 Gram(s) IV Push once  dextrose 50% Injectable 12.5 Gram(s) IV Push once  dextrose 50% Injectable 25 Gram(s) IV Push once  glucagon  Injectable 1 milliGRAM(s) IntraMuscular once  heparin   Injectable 5000 Unit(s) SubCutaneous every 8 hours  insulin lispro (ADMELOG) corrective regimen sliding scale   SubCutaneous every 6 hours  lactated ringers. 1000 milliLiter(s) (125 mL/Hr) IV Continuous <Continuous>  metoprolol tartrate Injectable 5 milliGRAM(s) IV Push every 6 hours  ondansetron Injectable 4 milliGRAM(s) IV Push every 8 hours    MEDICATIONS  (PRN):      LABS:                        12.3   14.85 )-----------( 341      ( 20 May 2021 19:44 )             41.3     05-20    140  |  103  |  8   ----------------------------<  133<H>  4.4   |  26  |  0.82    Ca    10.0      20 May 2021 19:44  Phos  3.7     05-20  Mg     2.0     05-20          Pt is a 81yo F now s/p robot assisted right hemicolectomy and Robotic TLH BSO, SLNM, PPALND, Right Hemicolectomy and Mini Lap for Removal of Specimens by GYN    Plan:  - pain control  - cont renae  - NPO, adv to CLD tomorrow  - care per primary    D Team Surgery, [id number]

## 2021-05-20 NOTE — BRIEF OPERATIVE NOTE - SPECIMENS
right colon
uterus, bilateral ovaries and fallopian tubes; right obturator LN; left common iliac lymph nodes; pelvic washings

## 2021-05-20 NOTE — BRIEF OPERATIVE NOTE - NSICDXBRIEFPREOP_GEN_ALL_CORE_FT
PRE-OP DIAGNOSIS:  Endometrial cancer 20-May-2021 18:32:00  Laura Figueroa  
PRE-OP DIAGNOSIS:  Colon cancer 20-May-2021 14:42:23  Luisa Townsend

## 2021-05-20 NOTE — PROVIDER CONTACT NOTE (OTHER) - SITUATION
pt presented to unit from PACU,  settled.  Unit admission vitals taken.  pt hr fluctuating between low 60s  to 40s.

## 2021-05-20 NOTE — PROGRESS NOTE ADULT - SUBJECTIVE AND OBJECTIVE BOX
GYNECOLOGIC ONCOLOGY PA POST OP NOTE    Pt seen and examined at bedside. Pain well controlled. Pt denies headache, dizziness, nausea, vomiting, chest pain, palpitations and sob. Aguayo in place (adequate UOP 40cc/hr) Pt is NPO. Mini-lap and R scope site dressing changed, binder with warm packs in place.     OBJECTIVE:     VITALS:  T(F): 97.2 (05-20-21 @ 15:15), Max: 98.8 (05-20-21 @ 06:22)  HR: 50 (05-20-21 @ 17:15) (50 - 68)  BP: 123/52 (05-20-21 @ 17:00) (114/52 - 133/59)  RR: 13 (05-20-21 @ 17:15) (11 - 22)  SpO2: 94% (05-20-21 @ 17:15) (92% - 99%)  Wt(kg): --    I&O's Summary    20 May 2021 07:01  -  20 May 2021 17:30  --------------------------------------------------------  IN: 500 mL / OUT: 160 mL / NET: 340 mL        MEDICATIONS  (STANDING):  acetaminophen  IVPB .. 1000 milliGRAM(s) IV Intermittent once  dextrose 40% Gel 15 Gram(s) Oral once  dextrose 5%. 1000 milliLiter(s) (50 mL/Hr) IV Continuous <Continuous>  dextrose 5%. 1000 milliLiter(s) (100 mL/Hr) IV Continuous <Continuous>  dextrose 50% Injectable 25 Gram(s) IV Push once  dextrose 50% Injectable 12.5 Gram(s) IV Push once  dextrose 50% Injectable 25 Gram(s) IV Push once  glucagon  Injectable 1 milliGRAM(s) IntraMuscular once  heparin   Injectable 5000 Unit(s) SubCutaneous every 8 hours  insulin lispro (ADMELOG) corrective regimen sliding scale   SubCutaneous every 6 hours  lactated ringers. 1000 milliLiter(s) (125 mL/Hr) IV Continuous <Continuous>  metoprolol tartrate Injectable 5 milliGRAM(s) IV Push every 6 hours  ondansetron Injectable 4 milliGRAM(s) IV Push every 8 hours    MEDICATIONS  (PRN):  HYDROmorphone  Injectable 0.5 milliGRAM(s) IV Push every 10 minutes PRN Moderate Pain (4 - 6)  ondansetron Injectable 4 milliGRAM(s) IV Push every 15 minutes PRN Nausea and/or Vomiting      Physical Exam:  Constitutional: NAD  Pulmonary: clear to auscultation bilaterally   Cardiovascular: Regular rate and rhythm   Abdomen: soft, non-distended, appropriate tenderness, scope sites x 6 C/D/I, mini lap incision w/sterri strips and dressing C/D/I  Extremities: no lower extremity edema or calve tenderness

## 2021-05-20 NOTE — PROGRESS NOTE ADULT - ASSESSMENT
81 yo female, s/p Total Robotic hysterectomy, bilateral salpingo-oophorectomy, SLNM, PPALND,  mini-lap for removal of specimens, Robot-assisted right hemicolectomy in stable condition  -NPO  -LR@125  - pain management: IV Tylenol, Dilaudid   -renae catheter  -ISS, glucose monitoring (T2DM)  -encourage incentive spirometer use  -Lopressor 5mg q 6hrs (HTN)  -VS per routine  -Synthroid in am (hypothyroid)  -AM CBC/BMP-replete lytes prn   Dispo: admit to 4T for routine post op care  d/w gyn/onc team    SANA Marie PA-C  #[id number]   81 yo female, s/p Total Robotic hysterectomy, bilateral salpingo-oophorectomy, SLNM, PPALND,  mini-lap for removal of specimens, Robot-assisted right hemicolectomy in stable condition  -NPO  -LR@125  -HSQ q 8hrs  - pain management: IV Tylenol, Dilaudid   -renae catheter  -ISS, glucose monitoring (T2DM)  -encourage incentive spirometer use  -Lopressor 5mg q 6hrs (HTN)  -VS per routine  -Synthroid in am (hypothyroid)  -AM CBC/BMP-replete lytes prn   Dispo: admit to 4T for routine post op care  d/w gyn/onc team    SANA Marie PA-C  #[id number]

## 2021-05-20 NOTE — BRIEF OPERATIVE NOTE - NSICDXBRIEFPOSTOP_GEN_ALL_CORE_FT
POST-OP DIAGNOSIS:  Endometrial cancer 20-May-2021 18:32:14  Laura Figueroa  
POST-OP DIAGNOSIS:  Colon cancer 20-May-2021 14:42:35  Luisa Townsend

## 2021-05-20 NOTE — CHART NOTE - NSCHARTNOTEFT_GEN_A_CORE
Called to see Pt who presented to room from PACU with 94.0 temperature and HR 48-60  Pt was awake and alert and said she feels a little cold and otherwise has no other complaints. She is not having any pain and only has slight discomfort when she is moving. Pt denies chest pain, SOB, palpitations, nausea/vomiting, dizziness, headache. Pt with adequate U/O,   VS:  Lungs: CTA B/L  CVS: EKGs HR 60, 63 & 64 and reviewed with Cardiology NP with no changes from Pretesting EKG  Abdom: Soft, appropriately tender  Incision: Scope sites C/D/I & Mini Lap Dressing C/D/I  Extrem: NT B/L, Pt has Venodynes on for DVT ppx    A/P: 79 Y/O S/P Robotic TLH BSO, SLNM, PPALND, Right Hemicolectomy and Mini Lap for Removal of Specimens  Pt placed under Bear Hugger warming blanket  CBC, BMP, Mg & Phos sent STAT  Continue close observation  D/W Dr. Amaro & Dr. Damon Called to see Pt who presented to room from PACU with 94.0 temperature and HR fluctuating 48-60  Pt was awake and alert and said she feels a little cold and otherwise has no other complaints. She is not having any pain and only has slight discomfort when she is moving. Pt denies chest pain, SOB, palpitations, nausea/vomiting, dizziness, headache. Pt with adequate U/O,   VS: 144/57 52 14 95.1 O2sat 97% RA  Lungs: CTA B/L  CVS: EKGs HR 60, 63 & 64 and reviewed with Cardiology NP with no changes from Pretesting EKG  Abdom: Soft, appropriately tender  Incision: Scope sites C/D/I & Mini Lap Dressing C/D/I  Extrem: NT B/L, Pt has Venodynes on for DVT ppx    A/P: 79 Y/O S/P Robotic TLH BSO, SLNM, PPALND, Right Hemicolectomy and Mini Lap for Removal of Specimens  Pt placed under Arnol Hugger warming blanket  CBC, BMP, Mg & Phos sent STAT  Continue close observation  D/W Dr. Amaro & Dr. Damon Called to see Pt who presented to room from PACU with 94.0 temperature and HR fluctuating 48-60  Pt was awake and alert and said she feels a little cold and otherwise has no other complaints. She is not having any pain and only has slight discomfort when she is moving. Pt denies chest pain, SOB, palpitations, nausea/vomiting, dizziness, headache. Pt with adequate U/O,   VS: 144/57 60 14 95.1 O2sat 97% RA  Lungs: CTA B/L  CVS: EKGs HR 60, 63 & 64 and reviewed with Cardiology NP with no changes from Pretesting EKG  Abdom: Soft, appropriately tender  Incision: Scope sites C/D/I & Mini Lap Dressing C/D/I  Extrem: NT B/L, Pt has Venodynes on for DVT ppx    A/P: 79 Y/O S/P Robotic TLH BSO, SLNM, PPALND, Right Hemicolectomy and Mini Lap for Removal of Specimens  Pt placed under Arnol Hugger warming blanket  CBC, BMP, Mg & Phos sent STAT  Continue close observation  D/W Dr. Amaro & Dr. Damon Called to see Pt who presented to room from PACU with 94.0 temperature and HR fluctuating 48-60  Pt was awake and alert and said she feels a little cold and otherwise has no other complaints. She is not having any pain and only has slight discomfort when she is moving. Pt denies chest pain, SOB, palpitations, nausea/vomiting, dizziness, headache. Pt with adequate U/O,   VS: 144/57 60 14 95.1 O2sat 97% RA  Lungs: CTA B/L  CVS: EKGs HR 60, 63 & 64 and reviewed with Cardiology NP with no changes from Pretesting EKG  Abdom: Soft, appropriately tender  Incision: Scope sites C/D/I & Mini Lap Dressing C/D/I  Extrem: NT B/L, Pt has Venodynes on for DVT ppx    A/P: 81 Y/O S/P Robotic TLH BSO, SLNM, PPALND, Right Hemicolectomy and Mini Lap for Removal of Specimens  Pt placed under Arnol Hugger warming blanket  EKG STAT  CBC, BMP, Mg & Phos sent STAT  Continue close observation  D/W Dr. Amaro & Dr. Damon Called to see Pt who presented to room from PACU with 94.0 temperature and HR fluctuating 48-60  Pt was awake and alert and said she feels a little cold and otherwise has no other complaints. She is not having any pain and only has slight discomfort when she is moving. Pt denies chest pain, SOB, palpitations, nausea/vomiting, dizziness, headache. Pt with adequate U/O,   VS: 144/57 60 14 95.1 O2sat 97% RA  Lungs: CTA B/L  CVS: EKGs HR 60, 63 & 64 and reviewed with Cardiology NP with no changes from Pretesting EKG  Abdom: Soft, appropriately tender  Incision: Scope sites C/D/I & Mini Lap Dressing C/D/I, with abdominal binder in place  Extrem: NT B/L, Pt has Venodynes on for DVT ppx    A/P: 81 Y/O S/P Robotic TLH BSO, SLNM, PPALND, Right Hemicolectomy and Mini Lap for Removal of Specimens  Pt placed under Arnol Hugger warming blanket  EKG STAT  CBC, BMP, Mg & Phos sent STAT  Continue close observation  D/W Dr. Amaro & Dr. Damon

## 2021-05-20 NOTE — BRIEF OPERATIVE NOTE - NSICDXBRIEFPROCEDURE_GEN_ALL_CORE_FT
PROCEDURES:  Robot-assisted right hemicolectomy 20-May-2021 14:42:02  Luisa Townsend  
PROCEDURES:  Laparoscopic hysterectomy, total, uterus 250 g or less 20-May-2021 18:31:06 robotic assisted Laura Figueroa  Robot-assisted bilateral salpingo-oophorectomy 20-May-2021 18:31:27  Laura Figueroa  Dissection of pelvic and para-aortic lymph nodes 20-May-2021 18:31:39  Laura Figueroa  Notrees lymph node mapping 20-May-2021 18:31:49  Laura Figueroa

## 2021-05-21 ENCOUNTER — NON-APPOINTMENT (OUTPATIENT)
Age: 81
End: 2021-05-21

## 2021-05-21 ENCOUNTER — TRANSCRIPTION ENCOUNTER (OUTPATIENT)
Age: 81
End: 2021-05-21

## 2021-05-21 DIAGNOSIS — C54.1 MALIGNANT NEOPLASM OF ENDOMETRIUM: ICD-10-CM

## 2021-05-21 LAB
ANION GAP SERPL CALC-SCNC: 9 MMOL/L — SIGNIFICANT CHANGE UP (ref 7–14)
BASOPHILS # BLD AUTO: 0.02 K/UL — SIGNIFICANT CHANGE UP (ref 0–0.2)
BASOPHILS NFR BLD AUTO: 0.2 % — SIGNIFICANT CHANGE UP (ref 0–2)
BUN SERPL-MCNC: 6 MG/DL — LOW (ref 7–23)
CALCIUM SERPL-MCNC: 9.3 MG/DL — SIGNIFICANT CHANGE UP (ref 8.4–10.5)
CHLORIDE SERPL-SCNC: 103 MMOL/L — SIGNIFICANT CHANGE UP (ref 98–107)
CO2 SERPL-SCNC: 25 MMOL/L — SIGNIFICANT CHANGE UP (ref 22–31)
COVID-19 SPIKE DOMAIN AB INTERP: POSITIVE
COVID-19 SPIKE DOMAIN ANTIBODY RESULT: >250 U/ML — HIGH
CREAT SERPL-MCNC: 0.78 MG/DL — SIGNIFICANT CHANGE UP (ref 0.5–1.3)
EOSINOPHIL # BLD AUTO: 0.01 K/UL — SIGNIFICANT CHANGE UP (ref 0–0.5)
EOSINOPHIL NFR BLD AUTO: 0.1 % — SIGNIFICANT CHANGE UP (ref 0–6)
GLUCOSE BLDC GLUCOMTR-MCNC: 106 MG/DL — HIGH (ref 70–99)
GLUCOSE BLDC GLUCOMTR-MCNC: 116 MG/DL — HIGH (ref 70–99)
GLUCOSE BLDC GLUCOMTR-MCNC: 120 MG/DL — HIGH (ref 70–99)
GLUCOSE BLDC GLUCOMTR-MCNC: 122 MG/DL — HIGH (ref 70–99)
GLUCOSE BLDC GLUCOMTR-MCNC: 131 MG/DL — HIGH (ref 70–99)
GLUCOSE SERPL-MCNC: 132 MG/DL — HIGH (ref 70–99)
HCT VFR BLD CALC: 33.1 % — LOW (ref 34.5–45)
HCT VFR BLD CALC: 34.9 % — SIGNIFICANT CHANGE UP (ref 34.5–45)
HGB BLD-MCNC: 10.3 G/DL — LOW (ref 11.5–15.5)
HGB BLD-MCNC: 10.6 G/DL — LOW (ref 11.5–15.5)
IANC: 7.65 K/UL — SIGNIFICANT CHANGE UP (ref 1.5–8.5)
IMM GRANULOCYTES NFR BLD AUTO: 0.3 % — SIGNIFICANT CHANGE UP (ref 0–1.5)
LYMPHOCYTES # BLD AUTO: 1.2 K/UL — SIGNIFICANT CHANGE UP (ref 1–3.3)
LYMPHOCYTES # BLD AUTO: 12.4 % — LOW (ref 13–44)
MAGNESIUM SERPL-MCNC: 1.9 MG/DL — SIGNIFICANT CHANGE UP (ref 1.6–2.6)
MCHC RBC-ENTMCNC: 26.6 PG — LOW (ref 27–34)
MCHC RBC-ENTMCNC: 26.8 PG — LOW (ref 27–34)
MCHC RBC-ENTMCNC: 30.4 GM/DL — LOW (ref 32–36)
MCHC RBC-ENTMCNC: 31.1 GM/DL — LOW (ref 32–36)
MCV RBC AUTO: 85.5 FL — SIGNIFICANT CHANGE UP (ref 80–100)
MCV RBC AUTO: 88.4 FL — SIGNIFICANT CHANGE UP (ref 80–100)
MONOCYTES # BLD AUTO: 0.74 K/UL — SIGNIFICANT CHANGE UP (ref 0–0.9)
MONOCYTES NFR BLD AUTO: 7.7 % — SIGNIFICANT CHANGE UP (ref 2–14)
NEUTROPHILS # BLD AUTO: 7.65 K/UL — HIGH (ref 1.8–7.4)
NEUTROPHILS NFR BLD AUTO: 79.3 % — HIGH (ref 43–77)
NRBC # BLD: 0 /100 WBCS — SIGNIFICANT CHANGE UP
NRBC # BLD: 0 /100 WBCS — SIGNIFICANT CHANGE UP
NRBC # FLD: 0 K/UL — SIGNIFICANT CHANGE UP
NRBC # FLD: 0 K/UL — SIGNIFICANT CHANGE UP
PHOSPHATE SERPL-MCNC: 2.8 MG/DL — SIGNIFICANT CHANGE UP (ref 2.5–4.5)
PLATELET # BLD AUTO: 308 K/UL — SIGNIFICANT CHANGE UP (ref 150–400)
PLATELET # BLD AUTO: 323 K/UL — SIGNIFICANT CHANGE UP (ref 150–400)
POTASSIUM SERPL-MCNC: 3.9 MMOL/L — SIGNIFICANT CHANGE UP (ref 3.5–5.3)
POTASSIUM SERPL-SCNC: 3.9 MMOL/L — SIGNIFICANT CHANGE UP (ref 3.5–5.3)
RBC # BLD: 3.87 M/UL — SIGNIFICANT CHANGE UP (ref 3.8–5.2)
RBC # BLD: 3.95 M/UL — SIGNIFICANT CHANGE UP (ref 3.8–5.2)
RBC # FLD: 13.5 % — SIGNIFICANT CHANGE UP (ref 10.3–14.5)
RBC # FLD: 13.6 % — SIGNIFICANT CHANGE UP (ref 10.3–14.5)
SARS-COV-2 IGG+IGM SERPL QL IA: >250 U/ML — HIGH
SARS-COV-2 IGG+IGM SERPL QL IA: POSITIVE
SODIUM SERPL-SCNC: 137 MMOL/L — SIGNIFICANT CHANGE UP (ref 135–145)
WBC # BLD: 9.65 K/UL — SIGNIFICANT CHANGE UP (ref 3.8–10.5)
WBC # BLD: 9.81 K/UL — SIGNIFICANT CHANGE UP (ref 3.8–10.5)
WBC # FLD AUTO: 9.65 K/UL — SIGNIFICANT CHANGE UP (ref 3.8–10.5)
WBC # FLD AUTO: 9.81 K/UL — SIGNIFICANT CHANGE UP (ref 3.8–10.5)

## 2021-05-21 RX ORDER — ATORVASTATIN CALCIUM 80 MG/1
20 TABLET, FILM COATED ORAL DAILY
Refills: 0 | Status: DISCONTINUED | OUTPATIENT
Start: 2021-05-21 | End: 2021-05-21

## 2021-05-21 RX ORDER — AMLODIPINE BESYLATE 2.5 MG/1
10 TABLET ORAL DAILY
Refills: 0 | Status: DISCONTINUED | OUTPATIENT
Start: 2021-05-21 | End: 2021-05-22

## 2021-05-21 RX ORDER — KETOROLAC TROMETHAMINE 30 MG/ML
15 SYRINGE (ML) INJECTION EVERY 8 HOURS
Refills: 0 | Status: DISCONTINUED | OUTPATIENT
Start: 2021-05-21 | End: 2021-05-23

## 2021-05-21 RX ORDER — SODIUM CHLORIDE 9 MG/ML
3 INJECTION INTRAMUSCULAR; INTRAVENOUS; SUBCUTANEOUS EVERY 8 HOURS
Refills: 0 | Status: DISCONTINUED | OUTPATIENT
Start: 2021-05-21 | End: 2021-05-23

## 2021-05-21 RX ORDER — AMLODIPINE BESYLATE 2.5 MG/1
10 TABLET ORAL DAILY
Refills: 0 | Status: DISCONTINUED | OUTPATIENT
Start: 2021-05-21 | End: 2021-05-21

## 2021-05-21 RX ORDER — ACETAMINOPHEN 500 MG
975 TABLET ORAL EVERY 6 HOURS
Refills: 0 | Status: DISCONTINUED | OUTPATIENT
Start: 2021-05-21 | End: 2021-05-23

## 2021-05-21 RX ORDER — LEVOTHYROXINE SODIUM 125 MCG
25 TABLET ORAL DAILY
Refills: 0 | Status: DISCONTINUED | OUTPATIENT
Start: 2021-05-21 | End: 2021-05-23

## 2021-05-21 RX ORDER — APIXABAN 2.5 MG/1
1 TABLET, FILM COATED ORAL
Qty: 56 | Refills: 0
Start: 2021-05-21 | End: 2021-06-17

## 2021-05-21 RX ORDER — ENOXAPARIN SODIUM 100 MG/ML
40 INJECTION SUBCUTANEOUS DAILY
Refills: 0 | Status: DISCONTINUED | OUTPATIENT
Start: 2021-05-21 | End: 2021-05-23

## 2021-05-21 RX ORDER — METOPROLOL TARTRATE 50 MG
50 TABLET ORAL DAILY
Refills: 0 | Status: DISCONTINUED | OUTPATIENT
Start: 2021-05-21 | End: 2021-05-21

## 2021-05-21 RX ORDER — METOPROLOL TARTRATE 50 MG
50 TABLET ORAL DAILY
Refills: 0 | Status: DISCONTINUED | OUTPATIENT
Start: 2021-05-21 | End: 2021-05-23

## 2021-05-21 RX ORDER — APIXABAN 2.5 MG/1
1 TABLET, FILM COATED ORAL
Qty: 56 | Refills: 0
Start: 2021-05-21 | End: 2021-06-19

## 2021-05-21 RX ORDER — PANTOPRAZOLE SODIUM 20 MG/1
40 TABLET, DELAYED RELEASE ORAL
Refills: 0 | Status: DISCONTINUED | OUTPATIENT
Start: 2021-05-21 | End: 2021-05-23

## 2021-05-21 RX ORDER — SIMETHICONE 80 MG/1
80 TABLET, CHEWABLE ORAL THREE TIMES A DAY
Refills: 0 | Status: DISCONTINUED | OUTPATIENT
Start: 2021-05-21 | End: 2021-05-23

## 2021-05-21 RX ORDER — ENOXAPARIN SODIUM 100 MG/ML
40 INJECTION SUBCUTANEOUS DAILY
Refills: 0 | Status: DISCONTINUED | OUTPATIENT
Start: 2021-05-21 | End: 2021-05-21

## 2021-05-21 RX ADMIN — Medication 400 MILLIGRAM(S): at 09:48

## 2021-05-21 RX ADMIN — Medication 15 MILLIGRAM(S): at 14:05

## 2021-05-21 RX ADMIN — Medication 975 MILLIGRAM(S): at 19:31

## 2021-05-21 RX ADMIN — Medication 400 MILLIGRAM(S): at 03:13

## 2021-05-21 RX ADMIN — ENOXAPARIN SODIUM 40 MILLIGRAM(S): 100 INJECTION SUBCUTANEOUS at 11:08

## 2021-05-21 RX ADMIN — SODIUM CHLORIDE 3 MILLILITER(S): 9 INJECTION INTRAMUSCULAR; INTRAVENOUS; SUBCUTANEOUS at 21:02

## 2021-05-21 RX ADMIN — Medication 15 MILLIGRAM(S): at 13:50

## 2021-05-21 RX ADMIN — ONDANSETRON 4 MILLIGRAM(S): 8 TABLET, FILM COATED ORAL at 05:37

## 2021-05-21 RX ADMIN — HEPARIN SODIUM 5000 UNIT(S): 5000 INJECTION INTRAVENOUS; SUBCUTANEOUS at 02:03

## 2021-05-21 NOTE — CHART NOTE - NSCHARTNOTEFT_GEN_A_CORE
Patient seen and evaluated at bedside. Patient doing well, states that pain is well controlled with PO Tylenol and IV Toradol. She is tolerating a carb-consistent CLD. Not yet passing flatus. She is ambulating independently and voiding spontaneously without difficulty. Denies headaches, lightheadedness, chest pain, shortness of breath, nausea, vomiting, diarrhea, constipation, abdominal pain, or leg swelling.    Vital Signs Last 24 Hrs  T(C): 36.7 (21 May 2021 13:42), Max: 36.8 (21 May 2021 05:32)  T(F): 98.1 (21 May 2021 13:42), Max: 98.3 (21 May 2021 09:47)  HR: 58 (21 May 2021 13:42) (52 - 72)  BP: 120/52 (21 May 2021 13:42) (116/52 - 144/57)  RR: 18 (21 May 2021 13:42) (14 - 18)  SpO2: 98% (21 May 2021 13:42) (97% - 100%)    Physical Exam  Constitutional: NAD  CV: RRR  Resp: CTABL  Abd: soft, nontender, nondistended, +BS, no rebound or guarding  Incisions: port sites and mini-lap c/d/i with overlaying dressing  Ext: nonerythematous, nonedematous bilaterally    Assessment: 79yo POD#1 s/p RA TLH-BSO, SLNM, PPALND (frozen = malignant), right hemicolectomy, and mini-lap for specimen extraction. Patient stable and doing well postoperatively.     Plan  - CV: hemodynamically stable, continue home Amlodipine and Metoprolol  - Pulm: O2 saturation wnl on room air, continue incentive spirometry   - GI: Tolerating CC-CLD, advance diet per surgery  - GI: SLIV, UOP adequate  - Endo: FS well controlled on ISS; continue home Synthroid  - Heme: Continue Lovenox and SCDs; Apixaban Rx provided  - Pain: Continue PO Tylenol and Toradol    D/w GYN Oncology Team  Cristal Cardenas PGY1

## 2021-05-21 NOTE — DISCHARGE NOTE PROVIDER - HOSPITAL COURSE
Patient underwent RA TLH, BSO, SLNM, PPLND, and RA right hemicolectomy (with gen surg), minilap for removal of specimen (frozen=malignant) on 5/20. Total .   Hct:   .  POD#0 Pain well controlled with IV tylenol. Patient remained NPO.   POD#1 No acute events overnight. Patient was advanced to a regular diet. Aguayo was discontinued and patient voided spontaneously. Patient was transitioned to oral analgesics and pain was well controlled. Upon discharge on POD#1, the patient is ambulating, voiding spontaneously, tolerating oral intake, pain was well controlled with oral medication, and vital signs were stable. Patient to have close follow up with Dr. Cox Patient underwent RA TLH, BSO, SLNM, PPLND, and RA right hemicolectomy (with gen surg), minilap for removal of specimen (frozen=malignant) on 5/20. Total .   Hct:   .  POD#0 Pain well controlled with IV tylenol. Patient remained NPO.   POD#1 No acute events overnight. Patient was advanced to clears. Aguayo was discontinued and patient voided spontaneously. Patient was transitioned to oral analgesics and pain was well controlled.   POD#2, patient advanced to regular diet.   Upon discharge on POD#2, the patient is ambulating, voiding spontaneously, tolerating oral intake, pain was well controlled with oral medication, and vital signs were stable. Patient to have close follow up with Dr. Cox Patient underwent RA TLH, BSO, SLNM, PPLND, and RA right hemicolectomy (with gen surg), minilap for removal of specimen (frozen=malignant) on 5/20. Total .   Hct: 31->30.  POD#0 Pain well controlled with IV tylenol. Patient remained NPO. POD#1 No acute events overnight. Patient was advanced to clears. Aguayo was discontinued and patient voided spontaneously. Patient was transitioned to oral analgesics and pain was well controlled.  Upon discharge on POD#3, the patient is ambulating, voiding spontaneously, tolerating oral intake, pain was well controlled with oral medication, and vital signs were stable. Patient to have close follow up with Dr. Cox Patient underwent RA TLH, BSO, SLNM, PPLND, and RA right hemicolectomy (with gen surg), minilap for removal of specimen (frozen=malignant left pelvic lymph node) on 5/20. Total .   Hct: 31->30.  POD#0 Pain well controlled with IV tylenol. Patient remained NPO. POD#1 No acute events overnight. Patient was advanced to clears. Aguayo was discontinued and patient voided spontaneously. Patient was transitioned to oral analgesics and pain was well controlled.  Upon discharge on POD#3, the patient is ambulating, voiding spontaneously, tolerating oral intake, pain was well controlled with oral medication, and vital signs were stable. Patient to have close follow up with Dr. Cox

## 2021-05-21 NOTE — PROGRESS NOTE ADULT - ASSESSMENT
81yo F now s/p robot assisted right hemicolectomy and Robotic TLH BSO, SLNM, PPALND, Right Hemicolectomy and Mini Lap for Removal of Specimens by GYN    Plan:  - Advance to CLD   - Pain control  - D/c Aguayo  - Care per primary    D Team Surgery  [id number]

## 2021-05-21 NOTE — DISCHARGE NOTE PROVIDER - CARE PROVIDER_API CALL
Veronica Cox)  Gynecologic Oncology; Obstetrics and Gynecology  [address] [place] [address]  Phone: [phone]  Fax: [phone]  Established Patient  Follow Up Time:

## 2021-05-21 NOTE — DISCHARGE NOTE PROVIDER - NSDCMRMEDTOKEN_GEN_ALL_CORE_FT
levothyroxine 25 mcg (0.025 mg) oral tablet: 1 tab(s) orally once a day  Lipitor 20 mg oral tablet: 1 tab(s) orally once a day  metFORMIN 500 mg oral tablet, extended release: 1 tab(s) orally once a day  Norvasc 10 mg oral tablet: 1 tab(s) orally once a day  oxycodone-acetaminophen 5 mg-325 mg oral tablet: 1 tab(s) orally every 6 hours, As Needed -for severe pain MDD:4  Toprol-XL 50 mg oral tablet, extended release: 1 tab(s) orally once a day  Tylenol 500 mg oral tablet: 2 tab(s) orally every 6 hours   apixaban 2.5 mg oral tablet: 1 tab(s) orally every 12 hours MDD:2 tabs  please apply coupon  provider contact#[id number]  levothyroxine 25 mcg (0.025 mg) oral tablet: 1 tab(s) orally once a day  Lipitor 20 mg oral tablet: 1 tab(s) orally once a day  metFORMIN 500 mg oral tablet, extended release: 1 tab(s) orally once a day  Norvasc 10 mg oral tablet: 1 tab(s) orally once a day  oxycodone-acetaminophen 5 mg-325 mg oral tablet: 1 tab(s) orally every 6 hours, As Needed -for severe pain MDD:4  Toprol-XL 50 mg oral tablet, extended release: 1 tab(s) orally once a day  Tylenol 500 mg oral tablet: 2 tab(s) orally every 6 hours

## 2021-05-21 NOTE — PROGRESS NOTE ADULT - PROBLEM SELECTOR PLAN 1
Fellow Note    Patient seen and examined. Agree with above.    VS reviewed  Labs reviewed    CLD  OOB  VTE ppx  PO analgesia  ISS  Restart home meds  Home eliquis  Monitor for return of bowel function      Sondra VALLE

## 2021-05-21 NOTE — PROGRESS NOTE ADULT - ASSESSMENT
79yo POD#1 s/p RA TLH-BSO, SLNM, PPALND (frozen = malignant), right hemicolectomy, and mini-lap for specimen extraction. Pt progressing appropriately in postoperative period.    Neuro: Pain well controlled on current regimen, c/w Ofirmev while NPO  CV: Hemodynamically stable, f/u AM CBC  Pulm: O2 sat WNL on RA, Increase ambulation, encourage incentive spirometry use  FEN: LR@125, f/u AM BMP, replete lytes PRN, NPO, diet per surgery  GI: Protonix  : Aguayo in place, UOP adequate _____, d/c pending AM CBC/Surg,   Heme: DVT ppx: HSQ -> transition to Lovenox in AM, SCDs while in bed  ID: Afebrile, No signs of infection  Endo: h/o DM2, ISS q6h while NPO; h/o hypothyroidism, restart Synthroid in AM (PO if adv diet or IVP if NPO)  Dispo: routine postop care    Laura Figueroa R2 79yo POD#1 s/p RA TLH-BSO, SLNM, PPALND (frozen = malignant), right hemicolectomy, and mini-lap for specimen extraction. Pt progressing appropriately in postoperative period.    Neuro: Pain well controlled on current regimen, c/w Ofirmev while NPO  CV: Hemodynamically stable, f/u AM CBC  Pulm: O2 sat WNL on RA, Increase ambulation, encourage incentive spirometry use  FEN: LR@125, f/u AM BMP, replete lytes PRN, NPO, diet per surgery  GI: Protonix  : Aguayo in place, UOP adequate, d/c pending AM CBC,   Heme: DVT ppx: HSQ -> transition to Lovenox in AM, SCDs while in bed  ID: Afebrile, No signs of infection  Endo: h/o DM2, ISS q6h while NPO; h/o hypothyroidism, restart Synthroid in AM (PO if adv diet or IVP if NPO)  Dispo: routine postop care    Laura Figueroa R2

## 2021-05-21 NOTE — CHART NOTE - NSCHARTNOTEFT_GEN_A_CORE
Apixaban prescription picked up at [facility] pharmacy and given to daughter, Tanika, at bedside. There is no copay. Reviewed medication as well as discharge instructions with both patient and daughter.

## 2021-05-21 NOTE — DISCHARGE NOTE PROVIDER - NSDCCPTREATMENT_GEN_ALL_CORE_FT
PRINCIPAL PROCEDURE  Procedure: Laparoscopic hysterectomy, total, uterus 250 g or less  Findings and Treatment: robotic assisted      SECONDARY PROCEDURE  Procedure: Robot-assisted right hemicolectomy  Findings and Treatment:     Procedure: Dissection of pelvic and para-aortic lymph nodes  Findings and Treatment:     Procedure: [place] lymph node mapping  Findings and Treatment:     Procedure: Robot-assisted bilateral salpingo-oophorectomy  Findings and Treatment:

## 2021-05-21 NOTE — PROGRESS NOTE ADULT - SUBJECTIVE AND OBJECTIVE BOX
ANESTHESIA POSTOP CHECK    80y Female POSTOP DAY 1 s/p EUA, Robotic Lap RASHEEDA/BSO under general anesthesia.    No COMPLAINTS    NO APPARENT ANESTHESIA COMPLICATIONS

## 2021-05-21 NOTE — DISCHARGE NOTE PROVIDER - NSDCFUADDINST_GEN_ALL_CORE_FT
Follow with Dr. Cox as scheduled on June 8. Expect abdominal cramping/pain and spotting for the next weeks. Take Tylenol for cramping. Oxycodone has been sent to your pharmacy for more severe pain. Call your physician or go to the emergency room if you experience any of the following: heavy vaginal bleeding (soaking more than 2 pads in 1 hour for 2 hours), fever, chills, nausea, vomiting, or pain that is not controlled by medication.

## 2021-05-21 NOTE — PROGRESS NOTE ADULT - SUBJECTIVE AND OBJECTIVE BOX
GYN ONC Progress Note    Pt seen and examined at bedside. No overnight events.   Pt without complaints. Pain well controlled on current regimen.   NPO. Not yet OOB. Not yet passing flatus. +Aguayo.  Denies SOB/CP/palpitations, fever/chills, nausea/emesis.    Vital Signs Last 24 Hrs  T(C): 36.6 (21 May 2021 01:40), Max: 37.1 (20 May 2021 06:22)  T(F): 97.8 (21 May 2021 01:40), Max: 98.8 (20 May 2021 06:22)  HR: 66 (21 May 2021 01:40) (50 - 72)  BP: 129/59 (21 May 2021 01:40) (114/52 - 144/57)  BP(mean): 71 (20 May 2021 17:00) (67 - 77)  RR: 17 (21 May 2021 01:40) (11 - 22)  SpO2: 100% (21 May 2021 01:40) (92% - 100%)    05-20 @ 07:01  -  05-21 @ 05:28  --------------------------------------------------------  IN: 500 mL / OUT: 935 mL / NET: -435 mL        PHYSICAL EXAM:  Gen: NAD, A+O x 3  CV: Normal S1/S2, RRR  Pulm: CTA BL  Abd: Soft, appropriately tender,  mildly distended, no rebound or guarding, +BS  Incision: 6x port sites C/D/I w/steri strips and unsaturated opsites; mini-lap with unsaturated Gentle dressing  Extremities: No swelling or calf tenderness, SCDs in place    Labs, additional tests:             12.3   14.85 )-----------( 341      ( 05-20 @ 19:44 )             41.3       05-20    140  |  103  |  8   ----------------------------<  133<H>  4.4   |  26  |  0.82    Ca    10.0      20 May 2021 19:44  Phos  3.7     05-20  Mg     2.0     05-20        MEDICATIONS  (STANDING):  acetaminophen  IVPB .. 1000 milliGRAM(s) IV Intermittent once  dextrose 40% Gel 15 Gram(s) Oral once  dextrose 5%. 1000 milliLiter(s) (50 mL/Hr) IV Continuous <Continuous>  dextrose 5%. 1000 milliLiter(s) (100 mL/Hr) IV Continuous <Continuous>  dextrose 50% Injectable 25 Gram(s) IV Push once  dextrose 50% Injectable 12.5 Gram(s) IV Push once  dextrose 50% Injectable 25 Gram(s) IV Push once  glucagon  Injectable 1 milliGRAM(s) IntraMuscular once  heparin   Injectable 5000 Unit(s) SubCutaneous every 8 hours  insulin lispro (ADMELOG) corrective regimen sliding scale   SubCutaneous every 6 hours  lactated ringers. 1000 milliLiter(s) (125 mL/Hr) IV Continuous <Continuous>  metoprolol tartrate Injectable 5 milliGRAM(s) IV Push every 6 hours  ondansetron Injectable 4 milliGRAM(s) IV Push every 8 hours    MEDICATIONS  (PRN):

## 2021-05-21 NOTE — PROGRESS NOTE ADULT - SUBJECTIVE AND OBJECTIVE BOX
SURGERY PROGRESS NOTE    SUBJECTIVE / 24H EVENTS:  Patient seen and examined on morning rounds. No acute events overnight. GI function (--)      OBJECTIVE:  VITAL SIGNS:  T(C): 36.8 (05-21-21 @ 09:47), Max: 36.8 (05-21-21 @ 05:32)  HR: 59 (05-21-21 @ 09:47) (50 - 72)  BP: 116/52 (05-21-21 @ 09:47) (114/52 - 144/57)  RR: 18 (05-21-21 @ 09:47) (11 - 22)  SpO2: 99% (05-21-21 @ 09:47) (92% - 100%)  Daily     Daily   POCT Blood Glucose.: 131 mg/dL (05-21-21 @ 06:02)  POCT Blood Glucose.: 120 mg/dL (05-21-21 @ 00:00)  POCT Blood Glucose.: 109 mg/dL (05-20-21 @ 19:37)      PHYSICAL EXAM:  Gen: NAD  LS: Respirations unlabored on RA  GI: Soft. Non distended. Appropriately tender. Port site incisions with dressings c/d/i  Ext: Warm, well perfused      05-20-21 @ 07:01  -  05-21-21 @ 07:00  --------------------------------------------------------  IN:    Lactated Ringers: 2000 mL  Total IN: 2000 mL    OUT:    Indwelling Catheter - Urethral (mL): 1085 mL  Total OUT: 1085 mL    Total NET: 915 mL      05-21-21 @ 07:01  -  05-21-21 @ 11:37  --------------------------------------------------------  IN:    IV PiggyBack: 100 mL  Total IN: 100 mL    OUT:    Indwelling Catheter - Urethral (mL): 150 mL  Total OUT: 150 mL    Total NET: -50 mL          LAB VALUES:  05-21    137  |  103  |  6<L>  ----------------------------<  132<H>  3.9   |  25  |  0.78    Ca    9.3      21 May 2021 06:42  Phos  2.8     05-21  Mg     1.9     05-21                                 10.6   9.65  )-----------( 308      ( 21 May 2021 10:13 )             34.9                   MICROBIOLOGY:      RADIOLOGY:        MEDICATIONS  (STANDING):  acetaminophen   Tablet .. 975 milliGRAM(s) Oral every 6 hours  amLODIPine   Tablet 10 milliGRAM(s) Oral daily  atorvastatin 20 milliGRAM(s) Oral daily  dextrose 40% Gel 15 Gram(s) Oral once  dextrose 5%. 1000 milliLiter(s) (50 mL/Hr) IV Continuous <Continuous>  dextrose 5%. 1000 milliLiter(s) (100 mL/Hr) IV Continuous <Continuous>  dextrose 50% Injectable 25 Gram(s) IV Push once  dextrose 50% Injectable 12.5 Gram(s) IV Push once  dextrose 50% Injectable 25 Gram(s) IV Push once  enoxaparin Injectable 40 milliGRAM(s) SubCutaneous daily  glucagon  Injectable 1 milliGRAM(s) IntraMuscular once  insulin lispro (ADMELOG) corrective regimen sliding scale   SubCutaneous every 6 hours  lactated ringers. 1000 milliLiter(s) (125 mL/Hr) IV Continuous <Continuous>  levothyroxine 25 MICROGram(s) Oral daily  metoprolol succinate ER 50 milliGRAM(s) Oral daily    MEDICATIONS  (PRN):  ketorolac   Injectable 15 milliGRAM(s) IV Push every 8 hours PRN Moderate Pain (4 - 6)  simethicone 80 milliGRAM(s) Chew three times a day PRN Gas

## 2021-05-22 LAB
ANION GAP SERPL CALC-SCNC: 9 MMOL/L — SIGNIFICANT CHANGE UP (ref 7–14)
BASOPHILS # BLD AUTO: 0.02 K/UL — SIGNIFICANT CHANGE UP (ref 0–0.2)
BASOPHILS NFR BLD AUTO: 0.2 % — SIGNIFICANT CHANGE UP (ref 0–2)
BUN SERPL-MCNC: 4 MG/DL — LOW (ref 7–23)
CALCIUM SERPL-MCNC: 9.6 MG/DL — SIGNIFICANT CHANGE UP (ref 8.4–10.5)
CHLORIDE SERPL-SCNC: 102 MMOL/L — SIGNIFICANT CHANGE UP (ref 98–107)
CO2 SERPL-SCNC: 27 MMOL/L — SIGNIFICANT CHANGE UP (ref 22–31)
CREAT SERPL-MCNC: 0.71 MG/DL — SIGNIFICANT CHANGE UP (ref 0.5–1.3)
EOSINOPHIL # BLD AUTO: 0.11 K/UL — SIGNIFICANT CHANGE UP (ref 0–0.5)
EOSINOPHIL NFR BLD AUTO: 1.2 % — SIGNIFICANT CHANGE UP (ref 0–6)
GLUCOSE BLDC GLUCOMTR-MCNC: 101 MG/DL — HIGH (ref 70–99)
GLUCOSE BLDC GLUCOMTR-MCNC: 108 MG/DL — HIGH (ref 70–99)
GLUCOSE BLDC GLUCOMTR-MCNC: 111 MG/DL — HIGH (ref 70–99)
GLUCOSE BLDC GLUCOMTR-MCNC: 93 MG/DL — SIGNIFICANT CHANGE UP (ref 70–99)
GLUCOSE SERPL-MCNC: 110 MG/DL — HIGH (ref 70–99)
HCT VFR BLD CALC: 37.1 % — SIGNIFICANT CHANGE UP (ref 34.5–45)
HGB BLD-MCNC: 11.1 G/DL — LOW (ref 11.5–15.5)
IANC: 7.36 K/UL — SIGNIFICANT CHANGE UP (ref 1.5–8.5)
IMM GRANULOCYTES NFR BLD AUTO: 0.4 % — SIGNIFICANT CHANGE UP (ref 0–1.5)
LYMPHOCYTES # BLD AUTO: 1.22 K/UL — SIGNIFICANT CHANGE UP (ref 1–3.3)
LYMPHOCYTES # BLD AUTO: 13 % — SIGNIFICANT CHANGE UP (ref 13–44)
MAGNESIUM SERPL-MCNC: 2 MG/DL — SIGNIFICANT CHANGE UP (ref 1.6–2.6)
MCHC RBC-ENTMCNC: 26.6 PG — LOW (ref 27–34)
MCHC RBC-ENTMCNC: 29.9 GM/DL — LOW (ref 32–36)
MCV RBC AUTO: 88.8 FL — SIGNIFICANT CHANGE UP (ref 80–100)
MONOCYTES # BLD AUTO: 0.64 K/UL — SIGNIFICANT CHANGE UP (ref 0–0.9)
MONOCYTES NFR BLD AUTO: 6.8 % — SIGNIFICANT CHANGE UP (ref 2–14)
NEUTROPHILS # BLD AUTO: 7.36 K/UL — SIGNIFICANT CHANGE UP (ref 1.8–7.4)
NEUTROPHILS NFR BLD AUTO: 78.4 % — HIGH (ref 43–77)
NRBC # BLD: 0 /100 WBCS — SIGNIFICANT CHANGE UP
NRBC # FLD: 0 K/UL — SIGNIFICANT CHANGE UP
PHOSPHATE SERPL-MCNC: 1.9 MG/DL — LOW (ref 2.5–4.5)
PLATELET # BLD AUTO: 308 K/UL — SIGNIFICANT CHANGE UP (ref 150–400)
POTASSIUM SERPL-MCNC: 3.9 MMOL/L — SIGNIFICANT CHANGE UP (ref 3.5–5.3)
POTASSIUM SERPL-SCNC: 3.9 MMOL/L — SIGNIFICANT CHANGE UP (ref 3.5–5.3)
RBC # BLD: 4.18 M/UL — SIGNIFICANT CHANGE UP (ref 3.8–5.2)
RBC # FLD: 13.9 % — SIGNIFICANT CHANGE UP (ref 10.3–14.5)
SODIUM SERPL-SCNC: 138 MMOL/L — SIGNIFICANT CHANGE UP (ref 135–145)
WBC # BLD: 9.39 K/UL — SIGNIFICANT CHANGE UP (ref 3.8–10.5)
WBC # FLD AUTO: 9.39 K/UL — SIGNIFICANT CHANGE UP (ref 3.8–10.5)

## 2021-05-22 RX ORDER — POTASSIUM PHOSPHATE, MONOBASIC POTASSIUM PHOSPHATE, DIBASIC 236; 224 MG/ML; MG/ML
15 INJECTION, SOLUTION INTRAVENOUS ONCE
Refills: 0 | Status: COMPLETED | OUTPATIENT
Start: 2021-05-22 | End: 2021-05-22

## 2021-05-22 RX ORDER — INSULIN LISPRO 100/ML
VIAL (ML) SUBCUTANEOUS AT BEDTIME
Refills: 0 | Status: DISCONTINUED | OUTPATIENT
Start: 2021-05-22 | End: 2021-05-23

## 2021-05-22 RX ORDER — SODIUM CHLORIDE 9 MG/ML
1000 INJECTION, SOLUTION INTRAVENOUS
Refills: 0 | Status: DISCONTINUED | OUTPATIENT
Start: 2021-05-22 | End: 2021-05-23

## 2021-05-22 RX ORDER — DEXTROSE 50 % IN WATER 50 %
25 SYRINGE (ML) INTRAVENOUS ONCE
Refills: 0 | Status: DISCONTINUED | OUTPATIENT
Start: 2021-05-22 | End: 2021-05-23

## 2021-05-22 RX ORDER — GLUCAGON INJECTION, SOLUTION 0.5 MG/.1ML
1 INJECTION, SOLUTION SUBCUTANEOUS ONCE
Refills: 0 | Status: DISCONTINUED | OUTPATIENT
Start: 2021-05-22 | End: 2021-05-23

## 2021-05-22 RX ORDER — DEXTROSE 50 % IN WATER 50 %
12.5 SYRINGE (ML) INTRAVENOUS ONCE
Refills: 0 | Status: DISCONTINUED | OUTPATIENT
Start: 2021-05-22 | End: 2021-05-23

## 2021-05-22 RX ORDER — DEXTROSE 50 % IN WATER 50 %
15 SYRINGE (ML) INTRAVENOUS ONCE
Refills: 0 | Status: DISCONTINUED | OUTPATIENT
Start: 2021-05-22 | End: 2021-05-23

## 2021-05-22 RX ORDER — INSULIN LISPRO 100/ML
VIAL (ML) SUBCUTANEOUS
Refills: 0 | Status: DISCONTINUED | OUTPATIENT
Start: 2021-05-22 | End: 2021-05-23

## 2021-05-22 RX ADMIN — Medication 975 MILLIGRAM(S): at 17:33

## 2021-05-22 RX ADMIN — Medication 975 MILLIGRAM(S): at 21:58

## 2021-05-22 RX ADMIN — POTASSIUM PHOSPHATE, MONOBASIC POTASSIUM PHOSPHATE, DIBASIC 62.5 MILLIMOLE(S): 236; 224 INJECTION, SOLUTION INTRAVENOUS at 10:57

## 2021-05-22 RX ADMIN — Medication 50 MILLIGRAM(S): at 06:11

## 2021-05-22 RX ADMIN — SODIUM CHLORIDE 3 MILLILITER(S): 9 INJECTION INTRAMUSCULAR; INTRAVENOUS; SUBCUTANEOUS at 21:34

## 2021-05-22 RX ADMIN — SODIUM CHLORIDE 3 MILLILITER(S): 9 INJECTION INTRAMUSCULAR; INTRAVENOUS; SUBCUTANEOUS at 14:00

## 2021-05-22 RX ADMIN — ENOXAPARIN SODIUM 40 MILLIGRAM(S): 100 INJECTION SUBCUTANEOUS at 14:01

## 2021-05-22 RX ADMIN — Medication 975 MILLIGRAM(S): at 04:29

## 2021-05-22 RX ADMIN — SODIUM CHLORIDE 3 MILLILITER(S): 9 INJECTION INTRAMUSCULAR; INTRAVENOUS; SUBCUTANEOUS at 06:10

## 2021-05-22 RX ADMIN — Medication 975 MILLIGRAM(S): at 10:57

## 2021-05-22 RX ADMIN — Medication 25 MICROGRAM(S): at 06:11

## 2021-05-22 RX ADMIN — AMLODIPINE BESYLATE 10 MILLIGRAM(S): 2.5 TABLET ORAL at 06:11

## 2021-05-22 RX ADMIN — PANTOPRAZOLE SODIUM 40 MILLIGRAM(S): 20 TABLET, DELAYED RELEASE ORAL at 06:11

## 2021-05-22 NOTE — PHYSICAL THERAPY INITIAL EVALUATION ADULT - RANGE OF MOTION EXAMINATION, REHAB EVAL
bilateral upper extremity ROM was WFL (within functional limits)/bilateral lower extremity ROM was WFL (within functional limits)
PAIN/INFLAMMATION/JOINT SWELLING

## 2021-05-22 NOTE — PHYSICAL THERAPY INITIAL EVALUATION ADULT - PERTINENT HX OF CURRENT PROBLEM, REHAB EVAL
Pt is a 80 year old female presenting with a pre-operative diagnosis of endometrial cancer. Pt s/p above listed scheduled procedure. Pt POD#2, stable, recovering appropriately on medical floor. Past medical history listed below.

## 2021-05-22 NOTE — PHYSICAL THERAPY INITIAL EVALUATION ADULT - PATIENT PROFILE REVIEW, REHAB EVAL
PT orders received: Increase as tolerated. Consult with ANNEL SOW, patient may participate in PT evaluation./yes

## 2021-05-22 NOTE — PHYSICAL THERAPY INITIAL EVALUATION ADULT - DID THE PATIENT HAVE SURGERY?
Total hysterectomy, Robot-assisted bilateral salpingo-oophorectomy, Dissection of pelvic and para-aortic lymph nodes, [place] lymph node mapping, right hemicolectomy, and mini-lap for specimen extraction./yes

## 2021-05-22 NOTE — PROGRESS NOTE ADULT - ASSESSMENT
Assessment/Plan:81yo POD#2 HD#3 s/p RA TLH-BSO, SLNM, PPALND (frozen = malignant), right hemicolectomy, and mini-lap for specimen extraction. Patient stable and doing well postoperatively.

## 2021-05-22 NOTE — PROGRESS NOTE ADULT - PROBLEM SELECTOR PLAN 1
Neuro: Pain well controlled on current regimen, c/w Ofirmev while NPO  CV: Hemodynamically stable, f/u AM CBC  Pulm: O2 sat WNL on RA, Increase ambulation, encourage incentive spirometry use  FEN: SLIV, f/u AM BMP, replete lytes PRN  GI: Advanced to LRD today  - cwProtonix  : Voiding spontaneousy  Heme: DVT ppx: Lovenox  ID: Afebrile, No signs of infection  Endo: h/o DM2, ISS low dose corrective ISS.   - h/o hypothyroidism, restart Synthroid in AM (PO if adv diet or IVP if NPO)  Dispo: routine postop care    Lamin Whitman PGY2 Neuro: Pain well controlled on current regimen, c/w Ofirmev while NPO  CV: Hemodynamically stable, f/u AM CBC  Pulm: O2 sat WNL on RA, Increase ambulation, encourage incentive spirometry use  FEN: SLIV, f/u AM BMP, replete lytes PRN  GI: Advanced to LRD today  - cw Protonix  : Voiding spontaneousy  Heme: DVT ppx: Lovenox  ID: Afebrile, No signs of infection  Endo: h/o DM2, ISS low dose corrective ISS.   - h/o hypothyroidism, restart Synthroid in AM (PO if adv diet or IVP if NPO)  Dispo: routine postop care    Lamin Whitman PGY2 Neuro: Pain well controlled on current regimen  CV: Hemodynamically stable, f/u AM CBC  Pulm: O2 sat WNL on RA, Increase ambulation, encourage incentive spirometry use  FEN: SLIV, f/u AM BMP, replete lytes PRN  GI: Advanced to LRD today  - cw Protonix  : Voiding spontaneousy  Heme: DVT ppx: Lovenox  ID: Afebrile, No signs of infection  Endo: h/o DM2, ISS low dose corrective ISS.   - h/o hypothyroidism, restart Synthroid   Dispo: routine postop care    Lamin Whitman PGY2    Fellow Addendum  Agree with above.  Pt advancing appropriately; VSS, exam wnl, am labs wnl.  LRD today.  Enc ambulation, continue routine post-op care.  MD Olya

## 2021-05-22 NOTE — PROGRESS NOTE ADULT - SUBJECTIVE AND OBJECTIVE BOX
Gyn ONC Progress Note POD#1 HD#2    Subjective:   Pt seen and examined at bedside. No events overnight. Pain well controlled. Patient ambulating. Tolerating clears. Pt denies fever, chills, chest pain, SOB, nausea, vomiting, lightheadedness, dizziness.      Objective:  T(F): 97.3 (05-22-21 @ 01:53), Max: 98.3 (05-21-21 @ 09:47)  HR: 76 (05-22-21 @ 01:53) (58 - 76)  BP: 138/52 (05-22-21 @ 01:53) (116/52 - 138/52)  RR: 17 (05-22-21 @ 01:53) (16 - 18)  SpO2: 99% (05-22-21 @ 01:53) (98% - 99%)  Wt(kg): --  I&O's Summary    20 May 2021 07:01  -  21 May 2021 07:00  --------------------------------------------------------  IN: 2000 mL / OUT: 1085 mL / NET: 915 mL    21 May 2021 07:01  -  22 May 2021 02:08  --------------------------------------------------------  IN: 1840 mL / OUT: 900 mL / NET: 940 mL      CAPILLARY BLOOD GLUCOSE      POCT Blood Glucose.: 106 mg/dL (21 May 2021 23:54)  POCT Blood Glucose.: 116 mg/dL (21 May 2021 17:06)  POCT Blood Glucose.: 122 mg/dL (21 May 2021 11:57)  POCT Blood Glucose.: 131 mg/dL (21 May 2021 06:02)      MEDICATIONS  (STANDING):  acetaminophen   Tablet .. 975 milliGRAM(s) Oral every 6 hours  amLODIPine   Tablet 10 milliGRAM(s) Oral daily  dextrose 40% Gel 15 Gram(s) Oral once  dextrose 5%. 1000 milliLiter(s) (50 mL/Hr) IV Continuous <Continuous>  dextrose 5%. 1000 milliLiter(s) (100 mL/Hr) IV Continuous <Continuous>  dextrose 50% Injectable 25 Gram(s) IV Push once  dextrose 50% Injectable 12.5 Gram(s) IV Push once  dextrose 50% Injectable 25 Gram(s) IV Push once  enoxaparin Injectable 40 milliGRAM(s) SubCutaneous daily  glucagon  Injectable 1 milliGRAM(s) IntraMuscular once  insulin lispro (ADMELOG) corrective regimen sliding scale   SubCutaneous every 6 hours  levothyroxine 25 MICROGram(s) Oral daily  metoprolol succinate ER 50 milliGRAM(s) Oral daily  pantoprazole    Tablet 40 milliGRAM(s) Oral before breakfast  sodium chloride 0.9% lock flush 3 milliLiter(s) IV Push every 8 hours    MEDICATIONS  (PRN):  ketorolac   Injectable 15 milliGRAM(s) IV Push every 8 hours PRN Moderate Pain (4 - 6)  simethicone 80 milliGRAM(s) Chew three times a day PRN Gas      Physical Exam  Constitutional: NAD  CV: RRR  Resp: CTABL  Abd: soft, nontender, nondistended, +BS, no rebound or guarding  Incisions: port sites and mini-lap c/d/i with overlaying dressing  Ext: nonerythematous, nonedematous bilaterally    LABS:            10.6      9.65 )------------( 308        ( 05-21-21 @ 10:13 )            34.9            10.3      9.81 )------------( 323        ( 05-21-21 @ 06:42 )            33.1    05-21    137    |  103    |  6<L>   ----------------------------<  132<H>  3.9     |  25     |  0.78   05-20    140    |  103    |  8      ----------------------------<  133<H>  4.4     |  26     |  0.82     Ca    9.3        21 May 2021 06:42  Ca    10.0       20 May 2021 19:44  Phos  2.8       05-21  Phos  3.7       05-20  Mg     1.9       05-21  Mg     2.0       05-20   Gyn ONC Progress Note POD#1 HD#2    Subjective:   Pt seen and examined at bedside. No events overnight. Pain well controlled. Patient ambulating. Tolerating clears. Pt had bowel movement overnight. Pt denies fever, chills, chest pain, SOB, nausea, vomiting, lightheadedness, dizziness.      Objective:  T(F): 97.3 (05-22-21 @ 01:53), Max: 98.3 (05-21-21 @ 09:47)  HR: 76 (05-22-21 @ 01:53) (58 - 76)  BP: 138/52 (05-22-21 @ 01:53) (116/52 - 138/52)  RR: 17 (05-22-21 @ 01:53) (16 - 18)  SpO2: 99% (05-22-21 @ 01:53) (98% - 99%)  Wt(kg): --  I&O's Summary    20 May 2021 07:01  -  21 May 2021 07:00  --------------------------------------------------------  IN: 2000 mL / OUT: 1085 mL / NET: 915 mL    21 May 2021 07:01  -  22 May 2021 02:08  --------------------------------------------------------  IN: 1840 mL / OUT: 900 mL / NET: 940 mL      CAPILLARY BLOOD GLUCOSE      POCT Blood Glucose.: 106 mg/dL (21 May 2021 23:54)  POCT Blood Glucose.: 116 mg/dL (21 May 2021 17:06)  POCT Blood Glucose.: 122 mg/dL (21 May 2021 11:57)  POCT Blood Glucose.: 131 mg/dL (21 May 2021 06:02)      MEDICATIONS  (STANDING):  acetaminophen   Tablet .. 975 milliGRAM(s) Oral every 6 hours  amLODIPine   Tablet 10 milliGRAM(s) Oral daily  dextrose 40% Gel 15 Gram(s) Oral once  dextrose 5%. 1000 milliLiter(s) (50 mL/Hr) IV Continuous <Continuous>  dextrose 5%. 1000 milliLiter(s) (100 mL/Hr) IV Continuous <Continuous>  dextrose 50% Injectable 25 Gram(s) IV Push once  dextrose 50% Injectable 12.5 Gram(s) IV Push once  dextrose 50% Injectable 25 Gram(s) IV Push once  enoxaparin Injectable 40 milliGRAM(s) SubCutaneous daily  glucagon  Injectable 1 milliGRAM(s) IntraMuscular once  insulin lispro (ADMELOG) corrective regimen sliding scale   SubCutaneous every 6 hours  levothyroxine 25 MICROGram(s) Oral daily  metoprolol succinate ER 50 milliGRAM(s) Oral daily  pantoprazole    Tablet 40 milliGRAM(s) Oral before breakfast  sodium chloride 0.9% lock flush 3 milliLiter(s) IV Push every 8 hours    MEDICATIONS  (PRN):  ketorolac   Injectable 15 milliGRAM(s) IV Push every 8 hours PRN Moderate Pain (4 - 6)  simethicone 80 milliGRAM(s) Chew three times a day PRN Gas      Physical Exam  Constitutional: NAD  CV: RRR  Resp: CTABL  Abd: soft, nontender, nondistended, +BS, no rebound or guarding  Incisions: port sites and mini-lap c/d/i with overlaying dressing  Ext: nonerythematous, nonedematous bilaterally    LABS:            10.6      9.65 )------------( 308        ( 05-21-21 @ 10:13 )            34.9            10.3      9.81 )------------( 323        ( 05-21-21 @ 06:42 )            33.1    05-21    137    |  103    |  6<L>   ----------------------------<  132<H>  3.9     |  25     |  0.78   05-20    140    |  103    |  8      ----------------------------<  133<H>  4.4     |  26     |  0.82     Ca    9.3        21 May 2021 06:42  Ca    10.0       20 May 2021 19:44  Phos  2.8       05-21  Phos  3.7       05-20  Mg     1.9       05-21  Mg     2.0       05-20   Gyn ONC Progress Note POD#1 HD#2    Subjective:   Pt seen and examined at bedside. No events overnight. Pain well controlled. Patient ambulating. Tolerating clears. Pt had bowel movement overnight. Pt denies fever, chills, chest pain, SOB, nausea, vomiting, lightheadedness, dizziness.      Objective:  T(F): 97.3 (05-22-21 @ 01:53), Max: 98.3 (05-21-21 @ 09:47)  HR: 76 (05-22-21 @ 01:53) (58 - 76)  BP: 138/52 (05-22-21 @ 01:53) (116/52 - 138/52)  RR: 17 (05-22-21 @ 01:53) (16 - 18)  SpO2: 99% (05-22-21 @ 01:53) (98% - 99%)  Wt(kg): --  I&O's Summary    20 May 2021 07:01  -  21 May 2021 07:00  --------------------------------------------------------  IN: 2000 mL / OUT: 1085 mL / NET: 915 mL    21 May 2021 07:01  -  22 May 2021 02:08  --------------------------------------------------------  IN: 1840 mL / OUT: 900 mL / NET: 940 mL      CAPILLARY BLOOD GLUCOSE      POCT Blood Glucose.: 106 mg/dL (21 May 2021 23:54)  POCT Blood Glucose.: 116 mg/dL (21 May 2021 17:06)  POCT Blood Glucose.: 122 mg/dL (21 May 2021 11:57)  POCT Blood Glucose.: 131 mg/dL (21 May 2021 06:02)      MEDICATIONS  (STANDING):  acetaminophen   Tablet .. 975 milliGRAM(s) Oral every 6 hours  amLODIPine   Tablet 10 milliGRAM(s) Oral daily  dextrose 40% Gel 15 Gram(s) Oral once  dextrose 5%. 1000 milliLiter(s) (50 mL/Hr) IV Continuous <Continuous>  dextrose 5%. 1000 milliLiter(s) (100 mL/Hr) IV Continuous <Continuous>  dextrose 50% Injectable 25 Gram(s) IV Push once  dextrose 50% Injectable 12.5 Gram(s) IV Push once  dextrose 50% Injectable 25 Gram(s) IV Push once  enoxaparin Injectable 40 milliGRAM(s) SubCutaneous daily  glucagon  Injectable 1 milliGRAM(s) IntraMuscular once  insulin lispro (ADMELOG) corrective regimen sliding scale   SubCutaneous every 6 hours  levothyroxine 25 MICROGram(s) Oral daily  metoprolol succinate ER 50 milliGRAM(s) Oral daily  pantoprazole    Tablet 40 milliGRAM(s) Oral before breakfast  sodium chloride 0.9% lock flush 3 milliLiter(s) IV Push every 8 hours    MEDICATIONS  (PRN):  ketorolac   Injectable 15 milliGRAM(s) IV Push every 8 hours PRN Moderate Pain (4 - 6)  simethicone 80 milliGRAM(s) Chew three times a day PRN Gas      Physical Exam  Constitutional: NAD  CV: RRR  Resp: CTABL  Abd: soft, nontender, nondistended, +BS, no rebound or guarding  Incisions: port sites and mini-lap c/d/i   Ext: nonerythematous, nonedematous bilaterally    LABS:            10.6      9.65 )------------( 308        ( 05-21-21 @ 10:13 )            34.9            10.3      9.81 )------------( 323        ( 05-21-21 @ 06:42 )            33.1    05-21    137    |  103    |  6<L>   ----------------------------<  132<H>  3.9     |  25     |  0.78   05-20    140    |  103    |  8      ----------------------------<  133<H>  4.4     |  26     |  0.82     Ca    9.3        21 May 2021 06:42  Ca    10.0       20 May 2021 19:44  Phos  2.8       05-21  Phos  3.7       05-20  Mg     1.9       05-21  Mg     2.0       05-20

## 2021-05-22 NOTE — PROGRESS NOTE ADULT - ASSESSMENT
81yo F now s/p robot assisted right hemicolectomy and Robotic TLH BSO, SLNM, PPALND, Right Hemicolectomy and Mini Lap for Removal of Specimens by GYN    -patient with BM yesterday, abdomen soft  -advanced to low residual, consistent carb diet  -removed outer dressing, leave steri strips in place  -likely home tomorrow if tolerates diet and continues to do well    page [phone] with surg onc questions  CODI Townsend, R5

## 2021-05-22 NOTE — PHYSICAL THERAPY INITIAL EVALUATION ADULT - ADDITIONAL COMMENTS
Pt lives in a two-family home on the first floor, no steps to negotiate. Daughter lives on the second floor and is available to assist as needed. Prior to admission, pt ambulated independently, no assistive device.     Pt was left seated in chair, all lines intact and call bell within reach, RN aware.

## 2021-05-22 NOTE — PROGRESS NOTE ADULT - SUBJECTIVE AND OBJECTIVE BOX
Surgery Progress Note     S: Patient resting comfortably this am. Burping subsided. Reports having a bowel movement yesterday. Tolerated clears.     MEDICATIONS  (STANDING):  acetaminophen   Tablet .. 975 milliGRAM(s) Oral every 6 hours  amLODIPine   Tablet 10 milliGRAM(s) Oral daily  dextrose 40% Gel 15 Gram(s) Oral once  dextrose 5%. 1000 milliLiter(s) (50 mL/Hr) IV Continuous <Continuous>  dextrose 5%. 1000 milliLiter(s) (100 mL/Hr) IV Continuous <Continuous>  dextrose 50% Injectable 25 Gram(s) IV Push once  dextrose 50% Injectable 12.5 Gram(s) IV Push once  dextrose 50% Injectable 25 Gram(s) IV Push once  enoxaparin Injectable 40 milliGRAM(s) SubCutaneous daily  glucagon  Injectable 1 milliGRAM(s) IntraMuscular once  insulin lispro (ADMELOG) corrective regimen sliding scale   SubCutaneous every 6 hours  levothyroxine 25 MICROGram(s) Oral daily  metoprolol succinate ER 50 milliGRAM(s) Oral daily  pantoprazole    Tablet 40 milliGRAM(s) Oral before breakfast  sodium chloride 0.9% lock flush 3 milliLiter(s) IV Push every 8 hours    MEDICATIONS  (PRN):  ketorolac   Injectable 15 milliGRAM(s) IV Push every 8 hours PRN Moderate Pain (4 - 6)  simethicone 80 milliGRAM(s) Chew three times a day PRN Gas      Physical Exam:    Vital Signs Last 24 Hrs  T(C): 36.6 (22 May 2021 06:07), Max: 36.8 (21 May 2021 09:47)  T(F): 97.8 (22 May 2021 06:07), Max: 98.3 (21 May 2021 09:47)  HR: 80 (22 May 2021 06:07) (58 - 80)  BP: 132/59 (22 May 2021 06:07) (116/52 - 138/52)  BP(mean): --  RR: 17 (22 May 2021 06:07) (17 - 18)  SpO2: 98% (22 May 2021 06:07) (98% - 99%)    05-21-21 @ 07:01  -  05-22-21 @ 07:00  --------------------------------------------------------  IN: 2200 mL / OUT: 1375 mL / NET: 825 mL        Gen: NAd, alert      Abdominal: Soft, minimally distended, non-tender, incision C/D/I     LABS:                        11.1   9.39  )-----------( 308      ( 22 May 2021 07:30 )             37.1     05-22    138  |  102  |  4<L>  ----------------------------<  110<H>  3.9   |  27  |  0.71    Ca    9.6      22 May 2021 07:30  Phos  1.9     05-22  Mg     2.0     05-22

## 2021-05-23 ENCOUNTER — TRANSCRIPTION ENCOUNTER (OUTPATIENT)
Age: 81
End: 2021-05-23

## 2021-05-23 VITALS
TEMPERATURE: 98 F | SYSTOLIC BLOOD PRESSURE: 116 MMHG | HEART RATE: 89 BPM | DIASTOLIC BLOOD PRESSURE: 48 MMHG | OXYGEN SATURATION: 99 % | RESPIRATION RATE: 18 BRPM

## 2021-05-23 LAB
ANION GAP SERPL CALC-SCNC: 9 MMOL/L — SIGNIFICANT CHANGE UP (ref 7–14)
BASOPHILS # BLD AUTO: 0.02 K/UL — SIGNIFICANT CHANGE UP (ref 0–0.2)
BASOPHILS NFR BLD AUTO: 0.3 % — SIGNIFICANT CHANGE UP (ref 0–2)
BUN SERPL-MCNC: 7 MG/DL — SIGNIFICANT CHANGE UP (ref 7–23)
CALCIUM SERPL-MCNC: 9.9 MG/DL — SIGNIFICANT CHANGE UP (ref 8.4–10.5)
CHLORIDE SERPL-SCNC: 102 MMOL/L — SIGNIFICANT CHANGE UP (ref 98–107)
CO2 SERPL-SCNC: 25 MMOL/L — SIGNIFICANT CHANGE UP (ref 22–31)
CREAT SERPL-MCNC: 0.83 MG/DL — SIGNIFICANT CHANGE UP (ref 0.5–1.3)
EOSINOPHIL # BLD AUTO: 0.34 K/UL — SIGNIFICANT CHANGE UP (ref 0–0.5)
EOSINOPHIL NFR BLD AUTO: 4.5 % — SIGNIFICANT CHANGE UP (ref 0–6)
GLUCOSE BLDC GLUCOMTR-MCNC: 105 MG/DL — HIGH (ref 70–99)
GLUCOSE BLDC GLUCOMTR-MCNC: 96 MG/DL — SIGNIFICANT CHANGE UP (ref 70–99)
GLUCOSE SERPL-MCNC: 116 MG/DL — HIGH (ref 70–99)
HCT VFR BLD CALC: 38.7 % — SIGNIFICANT CHANGE UP (ref 34.5–45)
HGB BLD-MCNC: 11.6 G/DL — SIGNIFICANT CHANGE UP (ref 11.5–15.5)
IANC: 5.18 K/UL — SIGNIFICANT CHANGE UP (ref 1.5–8.5)
IMM GRANULOCYTES NFR BLD AUTO: 0.4 % — SIGNIFICANT CHANGE UP (ref 0–1.5)
LYMPHOCYTES # BLD AUTO: 1.4 K/UL — SIGNIFICANT CHANGE UP (ref 1–3.3)
LYMPHOCYTES # BLD AUTO: 18.3 % — SIGNIFICANT CHANGE UP (ref 13–44)
MAGNESIUM SERPL-MCNC: 2 MG/DL — SIGNIFICANT CHANGE UP (ref 1.6–2.6)
MCHC RBC-ENTMCNC: 26.2 PG — LOW (ref 27–34)
MCHC RBC-ENTMCNC: 30 GM/DL — LOW (ref 32–36)
MCV RBC AUTO: 87.4 FL — SIGNIFICANT CHANGE UP (ref 80–100)
MONOCYTES # BLD AUTO: 0.66 K/UL — SIGNIFICANT CHANGE UP (ref 0–0.9)
MONOCYTES NFR BLD AUTO: 8.7 % — SIGNIFICANT CHANGE UP (ref 2–14)
NEUTROPHILS # BLD AUTO: 5.18 K/UL — SIGNIFICANT CHANGE UP (ref 1.8–7.4)
NEUTROPHILS NFR BLD AUTO: 67.8 % — SIGNIFICANT CHANGE UP (ref 43–77)
NRBC # BLD: 0 /100 WBCS — SIGNIFICANT CHANGE UP
NRBC # FLD: 0 K/UL — SIGNIFICANT CHANGE UP
PHOSPHATE SERPL-MCNC: 2.1 MG/DL — LOW (ref 2.5–4.5)
PLATELET # BLD AUTO: 359 K/UL — SIGNIFICANT CHANGE UP (ref 150–400)
POTASSIUM SERPL-MCNC: 3.9 MMOL/L — SIGNIFICANT CHANGE UP (ref 3.5–5.3)
POTASSIUM SERPL-SCNC: 3.9 MMOL/L — SIGNIFICANT CHANGE UP (ref 3.5–5.3)
RBC # BLD: 4.43 M/UL — SIGNIFICANT CHANGE UP (ref 3.8–5.2)
RBC # FLD: 13.8 % — SIGNIFICANT CHANGE UP (ref 10.3–14.5)
SODIUM SERPL-SCNC: 136 MMOL/L — SIGNIFICANT CHANGE UP (ref 135–145)
WBC # BLD: 7.63 K/UL — SIGNIFICANT CHANGE UP (ref 3.8–10.5)
WBC # FLD AUTO: 7.63 K/UL — SIGNIFICANT CHANGE UP (ref 3.8–10.5)

## 2021-05-23 RX ORDER — APIXABAN 2.5 MG/1
1 TABLET, FILM COATED ORAL
Qty: 56 | Refills: 0
Start: 2021-05-23 | End: 2021-06-19

## 2021-05-23 RX ORDER — SODIUM,POTASSIUM PHOSPHATES 278-250MG
1 POWDER IN PACKET (EA) ORAL ONCE
Refills: 0 | Status: COMPLETED | OUTPATIENT
Start: 2021-05-23 | End: 2021-05-23

## 2021-05-23 RX ADMIN — Medication 975 MILLIGRAM(S): at 06:00

## 2021-05-23 RX ADMIN — SODIUM CHLORIDE 3 MILLILITER(S): 9 INJECTION INTRAMUSCULAR; INTRAVENOUS; SUBCUTANEOUS at 06:01

## 2021-05-23 RX ADMIN — ENOXAPARIN SODIUM 40 MILLIGRAM(S): 100 INJECTION SUBCUTANEOUS at 12:36

## 2021-05-23 RX ADMIN — Medication 975 MILLIGRAM(S): at 12:36

## 2021-05-23 RX ADMIN — Medication 1 PACKET(S): at 10:51

## 2021-05-23 RX ADMIN — PANTOPRAZOLE SODIUM 40 MILLIGRAM(S): 20 TABLET, DELAYED RELEASE ORAL at 06:01

## 2021-05-23 RX ADMIN — Medication 25 MICROGRAM(S): at 06:01

## 2021-05-23 RX ADMIN — Medication 50 MILLIGRAM(S): at 06:01

## 2021-05-23 NOTE — PROGRESS NOTE ADULT - SUBJECTIVE AND OBJECTIVE BOX
Surgical Oncology    SUBJECTIVE: Pt seen and examined on rounds with team. No acute events overnight.        OBJECTIVE    PHYSICAL EXAM:   Gen: NAd, alert  Abdominal: Soft, minimally distended, non-tender, incision C/D/I      VITALS  T(C): 36.7 (05-23-21 @ 05:55), Max: 36.9 (05-22-21 @ 14:11)  HR: 78 (05-23-21 @ 05:55) (59 - 78)  BP: 142/76 (05-23-21 @ 05:55) (110/42 - 159/76)  RR: 18 (05-23-21 @ 05:55) (18 - 18)  SpO2: 97% (05-23-21 @ 05:55) (97% - 99%)  CAPILLARY BLOOD GLUCOSE      POCT Blood Glucose.: 105 mg/dL (23 May 2021 08:16)  POCT Blood Glucose.: 101 mg/dL (22 May 2021 21:05)  POCT Blood Glucose.: 93 mg/dL (22 May 2021 17:13)  POCT Blood Glucose.: 111 mg/dL (22 May 2021 12:11)      Is/Os    05-22 @ 07:01  -  05-23 @ 07:00  --------------------------------------------------------  IN:    Oral Fluid: 1040 mL  Total IN: 1040 mL    OUT:    Voided (mL): 1400 mL  Total OUT: 1400 mL    Total NET: -360 mL          MEDICATIONS (STANDING): acetaminophen   Tablet .. 975 milliGRAM(s) Oral every 6 hours  dextrose 40% Gel 15 Gram(s) Oral once  dextrose 5%. 1000 milliLiter(s) IV Continuous <Continuous>  dextrose 5%. 1000 milliLiter(s) IV Continuous <Continuous>  dextrose 50% Injectable 25 Gram(s) IV Push once  dextrose 50% Injectable 12.5 Gram(s) IV Push once  dextrose 50% Injectable 25 Gram(s) IV Push once  enoxaparin Injectable 40 milliGRAM(s) SubCutaneous daily  glucagon  Injectable 1 milliGRAM(s) IntraMuscular once  insulin lispro (ADMELOG) corrective regimen sliding scale   SubCutaneous three times a day before meals  insulin lispro (ADMELOG) corrective regimen sliding scale   SubCutaneous at bedtime  levothyroxine 25 MICROGram(s) Oral daily  metoprolol succinate ER 50 milliGRAM(s) Oral daily  pantoprazole    Tablet 40 milliGRAM(s) Oral before breakfast  potassium phosphate / sodium phosphate Powder (PHOS-NaK) 1 Packet(s) Oral once  sodium chloride 0.9% lock flush 3 milliLiter(s) IV Push every 8 hours    MEDICATIONS (PRN):ketorolac   Injectable 15 milliGRAM(s) IV Push every 8 hours PRN Moderate Pain (4 - 6)  simethicone 80 milliGRAM(s) Chew three times a day PRN Gas      LABS  CBC (05-23 @ 07:15)                              11.6                           7.63    )----------------(  359        67.8  % Neutrophils, 18.3  % Lymphocytes, ANC: 5.18                                38.7    CBC (05-22 @ 07:30)                              11.1<L>                         9.39    )----------------(  308        78.4<H>% Neutrophils, 13.0  % Lymphocytes, ANC: 7.36                                37.1      BMP (05-23 @ 07:15)             136     |  102     |  7     		Ca++ --      Ca 9.9                ---------------------------------( 116<H>		Mg 2.0                3.9     |  25      |  0.83  			Ph 2.1<L>  BMP (05-22 @ 07:30)             138     |  102     |  4<L>  		Ca++ --      Ca 9.6                ---------------------------------( 110<H>		Mg 2.0                3.9     |  27      |  0.71  			Ph 1.9<L>                IMAGING STUDIES

## 2021-05-23 NOTE — PROGRESS NOTE ADULT - ASSESSMENT
Assessment/Plan:79yo POD#3 HD#4 s/p RA TLH-BSO, SLNM, PPALND (frozen = malignant), right hemicolectomy, and mini-lap for specimen extraction. Patient stable and doing well postoperatively.

## 2021-05-23 NOTE — PROGRESS NOTE ADULT - SUBJECTIVE AND OBJECTIVE BOX
Gyn ONC Progress Note POD#3 HD#4    Subjective:   Pt seen and examined at bedside. No events overnight. Pain well controlled. Patient ambulating. Had BM yesterday. Tolerating low residue diet. Pt denies fever, chills, chest pain, SOB, nausea, vomiting, lightheadedness, dizziness.      Objective:  T(F): 97.9 (05-23-21 @ 01:40), Max: 98.4 (05-22-21 @ 14:11)  HR: 61 (05-23-21 @ 01:40) (59 - 80)  BP: 138/52 (05-23-21 @ 01:40) (110/42 - 159/76)  RR: 18 (05-23-21 @ 01:40) (17 - 18)  SpO2: 97% (05-23-21 @ 01:40) (97% - 99%)  Wt(kg): --  I&O's Summary    21 May 2021 07:01  -  22 May 2021 07:00  --------------------------------------------------------  IN: 2200 mL / OUT: 1375 mL / NET: 825 mL    22 May 2021 07:01  -  23 May 2021 05:29  --------------------------------------------------------  IN: 680 mL / OUT: 900 mL / NET: -220 mL      CAPILLARY BLOOD GLUCOSE  POCT Blood Glucose.: 101 mg/dL (22 May 2021 21:05)  POCT Blood Glucose.: 93 mg/dL (22 May 2021 17:13)  POCT Blood Glucose.: 111 mg/dL (22 May 2021 12:11)  POCT Blood Glucose.: 108 mg/dL (22 May 2021 06:29)      MEDICATIONS  (STANDING):  acetaminophen   Tablet .. 975 milliGRAM(s) Oral every 6 hours  dextrose 40% Gel 15 Gram(s) Oral once  dextrose 5%. 1000 milliLiter(s) (50 mL/Hr) IV Continuous <Continuous>  dextrose 5%. 1000 milliLiter(s) (100 mL/Hr) IV Continuous <Continuous>  dextrose 50% Injectable 25 Gram(s) IV Push once  dextrose 50% Injectable 12.5 Gram(s) IV Push once  dextrose 50% Injectable 25 Gram(s) IV Push once  enoxaparin Injectable 40 milliGRAM(s) SubCutaneous daily  glucagon  Injectable 1 milliGRAM(s) IntraMuscular once  insulin lispro (ADMELOG) corrective regimen sliding scale   SubCutaneous three times a day before meals  insulin lispro (ADMELOG) corrective regimen sliding scale   SubCutaneous at bedtime  levothyroxine 25 MICROGram(s) Oral daily  metoprolol succinate ER 50 milliGRAM(s) Oral daily  pantoprazole    Tablet 40 milliGRAM(s) Oral before breakfast  sodium chloride 0.9% lock flush 3 milliLiter(s) IV Push every 8 hours    MEDICATIONS  (PRN):  ketorolac   Injectable 15 milliGRAM(s) IV Push every 8 hours PRN Moderate Pain (4 - 6)  simethicone 80 milliGRAM(s) Chew three times a day PRN Gas    Physical Exam  Constitutional: NAD  CV: RRR  Resp: CTABL  Abd: soft, nontender, nondistended, +BS, no rebound or guarding  Incisions: port sites and mini-lap c/d/i   Ext: nonerythematous, nonedematous bilaterally    LABS:            11.1      9.39 )------------( 308        ( 05-22-21 @ 07:30 )            37.1    05-22    138    |  102    |  4<L>   ----------------------------<  110<H>  3.9     |  27     |  0.71     Ca    9.6        22 May 2021 07:30  Phos  1.9       05-22  Mg     2.0       05-22     Gyn ONC Progress Note POD#3 HD#4    Subjective:   Pt seen and examined at bedside. No events overnight. Pain well controlled. Patient ambulating. Had BM around 3am today. Tolerating low residue diet. Pt denies fever, chills, chest pain, SOB, nausea, vomiting, lightheadedness, dizziness.      Objective:  T(F): 97.9 (05-23-21 @ 01:40), Max: 98.4 (05-22-21 @ 14:11)  HR: 61 (05-23-21 @ 01:40) (59 - 80)  BP: 138/52 (05-23-21 @ 01:40) (110/42 - 159/76)  RR: 18 (05-23-21 @ 01:40) (17 - 18)  SpO2: 97% (05-23-21 @ 01:40) (97% - 99%)  Wt(kg): --  I&O's Summary    21 May 2021 07:01  -  22 May 2021 07:00  --------------------------------------------------------  IN: 2200 mL / OUT: 1375 mL / NET: 825 mL    22 May 2021 07:01  -  23 May 2021 05:29  --------------------------------------------------------  IN: 680 mL / OUT: 900 mL / NET: -220 mL      CAPILLARY BLOOD GLUCOSE  POCT Blood Glucose.: 101 mg/dL (22 May 2021 21:05)  POCT Blood Glucose.: 93 mg/dL (22 May 2021 17:13)  POCT Blood Glucose.: 111 mg/dL (22 May 2021 12:11)  POCT Blood Glucose.: 108 mg/dL (22 May 2021 06:29)      MEDICATIONS  (STANDING):  acetaminophen   Tablet .. 975 milliGRAM(s) Oral every 6 hours  dextrose 40% Gel 15 Gram(s) Oral once  dextrose 5%. 1000 milliLiter(s) (50 mL/Hr) IV Continuous <Continuous>  dextrose 5%. 1000 milliLiter(s) (100 mL/Hr) IV Continuous <Continuous>  dextrose 50% Injectable 25 Gram(s) IV Push once  dextrose 50% Injectable 12.5 Gram(s) IV Push once  dextrose 50% Injectable 25 Gram(s) IV Push once  enoxaparin Injectable 40 milliGRAM(s) SubCutaneous daily  glucagon  Injectable 1 milliGRAM(s) IntraMuscular once  insulin lispro (ADMELOG) corrective regimen sliding scale   SubCutaneous three times a day before meals  insulin lispro (ADMELOG) corrective regimen sliding scale   SubCutaneous at bedtime  levothyroxine 25 MICROGram(s) Oral daily  metoprolol succinate ER 50 milliGRAM(s) Oral daily  pantoprazole    Tablet 40 milliGRAM(s) Oral before breakfast  sodium chloride 0.9% lock flush 3 milliLiter(s) IV Push every 8 hours    MEDICATIONS  (PRN):  ketorolac   Injectable 15 milliGRAM(s) IV Push every 8 hours PRN Moderate Pain (4 - 6)  simethicone 80 milliGRAM(s) Chew three times a day PRN Gas    Physical Exam  Constitutional: NAD  CV: RRR  Resp: CTABL  Abd: soft, nontender, nondistended, +BS, no rebound or guarding  Incisions: port sites and mini-lap c/d/i   Ext: nonerythematous, nonedematous bilaterally    LABS:            11.1      9.39 )------------( 308        ( 05-22-21 @ 07:30 )            37.1    05-22    138    |  102    |  4<L>   ----------------------------<  110<H>  3.9     |  27     |  0.71     Ca    9.6        22 May 2021 07:30  Phos  1.9       05-22  Mg     2.0       05-22

## 2021-05-23 NOTE — PROGRESS NOTE ADULT - ATTENDING COMMENTS
tolerating clears  oob, lovenox/scd's
Patient seen and examined at bedside with team at 7am, agree with above gyn resident/fellow note, including assessment and plan. Abdomen benign. Discussed today's plan of care with patient, all questions answered. Continue routine postop care, d/c home.
Patient seen and examined at bedside with team, agree with above gyn resident/fellow note, including assessment and plan. Abdomen benign. Discussed today's plan of care with patient, all questions answered. Continue routine postop care.

## 2021-05-23 NOTE — PROGRESS NOTE ADULT - ASSESSMENT
79yo F now s/p robot assisted right hemicolectomy and Robotic TLH BSO, SLNM, PPALND, Right Hemicolectomy and Mini Lap for Removal of Specimens by GYN    -patient tolerating diet, passing flatus/bm  -ok to d/c from general surgery perspective

## 2021-05-23 NOTE — DISCHARGE NOTE NURSING/CASE MANAGEMENT/SOCIAL WORK - PATIENT PORTAL LINK FT
You can access the FollowMyHealth Patient Portal offered by [facility] by registering at the following website: [email or web address]. By joining [facility]’s FollowMyHealth portal, you will also be able to view your health information using other applications (apps) compatible with our system.

## 2021-05-23 NOTE — PROGRESS NOTE ADULT - PROBLEM SELECTOR PLAN 1
Neuro: Pain well controlled on current regimen  CV: Hemodynamically stable, f/u AM CBC  Pulm: O2 sat WNL on RA, Increase ambulation, encourage incentive spirometry use  FEN: SLIV, f/u AM BMP, replete lytes PRN  GI: Tolerating LRD; cw Protonix  : Voiding spontaneously  Heme: DVT ppx: Lovenox  ID: Afebrile, No signs of infection  Endo: h/o DM2, ISS low dose corrective ISS.   - h/o hypothyroidism, restart Synthroid   Dispo: routine postop care    Lamin Whitman PGY2 Neuro: Pain well controlled on current regimen  CV: Hemodynamically stable, f/u AM CBC  Pulm: O2 sat WNL on RA, Increase ambulation, encourage incentive spirometry use  FEN: SLIV, f/u AM BMP, replete lytes PRN  GI: Tolerating LRD; cw Protonix  : Voiding spontaneously  Heme: DVT ppx: Lovenox  ID: Afebrile, No signs of infection  Endo: h/o DM2, ISS low dose corrective ISS.   - h/o hypothyroidism, restart Synthroid   Dispo: routine postop care    Lamin Whitman PGY2    Fellow Addendum  Agree with above.  Pt meeting all milestones and feels ready for d/c.  VSS, exam wnl, labs wnl.  Stable for discharge home.  Discussed all aftercare instructions, follow-up plan, importance of taking eliquis bid to prevent clots.  Plan for pm discharge.  MD Olya

## 2021-05-25 ENCOUNTER — NON-APPOINTMENT (OUTPATIENT)
Age: 81
End: 2021-05-25

## 2021-05-26 LAB — NON-GYNECOLOGICAL CYTOLOGY STUDY: SIGNIFICANT CHANGE UP

## 2021-06-01 ENCOUNTER — NON-APPOINTMENT (OUTPATIENT)
Age: 81
End: 2021-06-01

## 2021-06-07 LAB — SURGICAL PATHOLOGY STUDY: SIGNIFICANT CHANGE UP

## 2021-06-08 ENCOUNTER — APPOINTMENT (OUTPATIENT)
Dept: GYNECOLOGIC ONCOLOGY | Facility: CLINIC | Age: 81
End: 2021-06-08
Payer: MEDICARE

## 2021-06-08 ENCOUNTER — APPOINTMENT (OUTPATIENT)
Dept: SURGICAL ONCOLOGY | Facility: CLINIC | Age: 81
End: 2021-06-08
Payer: MEDICARE

## 2021-06-08 VITALS
BODY MASS INDEX: 26.74 KG/M2 | TEMPERATURE: 97.1 F | SYSTOLIC BLOOD PRESSURE: 153 MMHG | HEIGHT: 66 IN | WEIGHT: 166.38 LBS | DIASTOLIC BLOOD PRESSURE: 71 MMHG | HEART RATE: 52 BPM

## 2021-06-08 VITALS
HEIGHT: 66 IN | DIASTOLIC BLOOD PRESSURE: 79 MMHG | RESPIRATION RATE: 16 BRPM | TEMPERATURE: 98.1 F | SYSTOLIC BLOOD PRESSURE: 140 MMHG | WEIGHT: 175 LBS | BODY MASS INDEX: 28.12 KG/M2 | OXYGEN SATURATION: 99 % | HEART RATE: 59 BPM

## 2021-06-08 PROCEDURE — 99024 POSTOP FOLLOW-UP VISIT: CPT

## 2021-06-09 NOTE — LETTER BODY
[Dear  ___] : Dear  [unfilled], [I recently saw our patient [unfilled] for a follow-up visit.] : I recently saw our patient, [unfilled] for a follow-up visit. [Attached please find my note.] : Attached please find my note. [FreeTextEntry1] : pathology 5/20/2021

## 2021-06-09 NOTE — REASON FOR VISIT
[Post Op] : post op visit [de-identified] : 5/20/2021 [de-identified] : Robotic Assist Total Laparoscopic Hysterectomy, Bilateral Salpingo oophorectomy, SLNM, PPALND (frozen: malignant), micki colectomy, mini lap for specimen extraction  [de-identified] : Returns offering no complaints - recovering very well from recent surgery. Normal bowel function. Normal bladder function. No vaginal bleeding or malodorous discharge.  Saw Dr. Reaves today.

## 2021-06-09 NOTE — ASSESSMENT
[FreeTextEntry1] : Stage 1 right colon cancer, s/p robotic colectomy.\par Doing well.\par No need for adjuvant treatment.\par Follow up with Dr. Cox to discuss endometrial cancer pathology and need for adjuvant treatment.\par Follow up in 3 months.

## 2021-06-09 NOTE — DISCUSSION/SUMMARY
[Clean] : was clean [Dry] : was dry [Intact] : was intact [Erythema] : was not erythematous [Ecchymosis] : was not ecchymotic [Seroma] : had no seroma [Sutures Intact] : had sutures  intact [None] : had no drainage [Normal Skin] : normal appearance [Normal Skin Turgor] : normal skin turgor [Firm] : soft [Tender] : nontender [Rebound] : no rebound tenderness [Guarding] : no guarding [Incisional Hernia] : no incisional hernia [Mass] : no palpable mass [Doing Well] : is doing well [Excellent Pain Control] : has excellent pain control [No Sign of Infection] : is showing no signs of infection [1] : 1 [Reviewed] : reviewed [de-identified] : deferred until next visit  [de-identified] : /GI function WNL [de-identified] : 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

## 2021-06-09 NOTE — HISTORY OF PRESENT ILLNESS
[de-identified] : Patient is s/p combined robotic RASHEEDA/BSO/PPALND and right colectomy 05/20/2021.\par She feels well and has minimal pain.\par Tolerating diet well.\par Pathology: right colectomy specimen shows 4 mm invasive moderately-differentiated adenocarcinoma arising from a large tubulovillous adenoma.  Staging T1N0 (0/12).

## 2021-06-14 ENCOUNTER — APPOINTMENT (OUTPATIENT)
Dept: HEMATOLOGY ONCOLOGY | Facility: CLINIC | Age: 81
End: 2021-06-14
Payer: MEDICARE

## 2021-06-16 ENCOUNTER — OUTPATIENT (OUTPATIENT)
Dept: OUTPATIENT SERVICES | Facility: HOSPITAL | Age: 81
LOS: 1 days | Discharge: HOME | End: 2021-06-16
Payer: MEDICARE

## 2021-06-16 DIAGNOSIS — Z98.51 TUBAL LIGATION STATUS: Chronic | ICD-10-CM

## 2021-06-16 DIAGNOSIS — C54.1 MALIGNANT NEOPLASM OF ENDOMETRIUM: ICD-10-CM

## 2021-06-17 ENCOUNTER — APPOINTMENT (OUTPATIENT)
Dept: HEMATOLOGY ONCOLOGY | Facility: CLINIC | Age: 81
End: 2021-06-17
Payer: MEDICARE

## 2021-06-17 ENCOUNTER — RESULT REVIEW (OUTPATIENT)
Age: 81
End: 2021-06-17

## 2021-06-17 VITALS
DIASTOLIC BLOOD PRESSURE: 76 MMHG | SYSTOLIC BLOOD PRESSURE: 144 MMHG | HEART RATE: 62 BPM | OXYGEN SATURATION: 100 % | TEMPERATURE: 97.2 F | RESPIRATION RATE: 16 BRPM | WEIGHT: 165.32 LBS | BODY MASS INDEX: 26.26 KG/M2 | HEIGHT: 66.54 IN

## 2021-06-17 LAB
BASOPHILS # BLD AUTO: 0.03 K/UL — SIGNIFICANT CHANGE UP (ref 0–0.2)
BASOPHILS NFR BLD AUTO: 0.5 % — SIGNIFICANT CHANGE UP (ref 0–2)
EOSINOPHIL # BLD AUTO: 0.26 K/UL — SIGNIFICANT CHANGE UP (ref 0–0.5)
EOSINOPHIL NFR BLD AUTO: 4.6 % — SIGNIFICANT CHANGE UP (ref 0–6)
HCT VFR BLD CALC: 40.7 % — SIGNIFICANT CHANGE UP (ref 34.5–45)
HGB BLD-MCNC: 12.1 G/DL — SIGNIFICANT CHANGE UP (ref 11.5–15.5)
IMM GRANULOCYTES NFR BLD AUTO: 0.2 % — SIGNIFICANT CHANGE UP (ref 0–1.5)
INR PPP: 1.17 RATIO
LYMPHOCYTES # BLD AUTO: 1.55 K/UL — SIGNIFICANT CHANGE UP (ref 1–3.3)
LYMPHOCYTES # BLD AUTO: 27.5 % — SIGNIFICANT CHANGE UP (ref 13–44)
MCHC RBC-ENTMCNC: 26.2 PG — LOW (ref 27–34)
MCHC RBC-ENTMCNC: 29.7 G/DL — LOW (ref 32–36)
MCV RBC AUTO: 88.1 FL — SIGNIFICANT CHANGE UP (ref 80–100)
MONOCYTES # BLD AUTO: 0.63 K/UL — SIGNIFICANT CHANGE UP (ref 0–0.9)
MONOCYTES NFR BLD AUTO: 11.2 % — SIGNIFICANT CHANGE UP (ref 2–14)
NEUTROPHILS # BLD AUTO: 3.15 K/UL — SIGNIFICANT CHANGE UP (ref 1.8–7.4)
NEUTROPHILS NFR BLD AUTO: 56 % — SIGNIFICANT CHANGE UP (ref 43–77)
NRBC # BLD: 0 /100 WBCS — SIGNIFICANT CHANGE UP (ref 0–0)
PLATELET # BLD AUTO: 421 K/UL — HIGH (ref 150–400)
PT BLD: 13.8 SEC
RBC # BLD: 4.62 M/UL — SIGNIFICANT CHANGE UP (ref 3.8–5.2)
RBC # FLD: 14.1 % — SIGNIFICANT CHANGE UP (ref 10.3–14.5)
WBC # BLD: 5.63 K/UL — SIGNIFICANT CHANGE UP (ref 3.8–10.5)
WBC # FLD AUTO: 5.63 K/UL — SIGNIFICANT CHANGE UP (ref 3.8–10.5)

## 2021-06-17 PROCEDURE — 99205 OFFICE O/P NEW HI 60 MIN: CPT

## 2021-06-17 RX ORDER — PROCHLORPERAZINE MALEATE 10 MG/1
10 TABLET ORAL
Qty: 30 | Refills: 2 | Status: ACTIVE | COMMUNITY
Start: 2021-06-17 | End: 1900-01-01

## 2021-06-18 ENCOUNTER — NON-APPOINTMENT (OUTPATIENT)
Age: 81
End: 2021-06-18

## 2021-06-18 LAB
ALBUMIN SERPL ELPH-MCNC: 4.5 G/DL
ALP BLD-CCNC: 109 U/L
ALT SERPL-CCNC: 14 U/L
ANION GAP SERPL CALC-SCNC: 10 MMOL/L
AST SERPL-CCNC: 19 U/L
BILIRUB SERPL-MCNC: <0.2 MG/DL
BUN SERPL-MCNC: 10 MG/DL
CALCIUM SERPL-MCNC: 10.7 MG/DL
CANCER AG125 SERPL-ACNC: 12 U/ML
CEA SERPL-MCNC: 1.2 NG/ML
CHLORIDE SERPL-SCNC: 104 MMOL/L
CO2 SERPL-SCNC: 26 MMOL/L
CREAT SERPL-MCNC: 1 MG/DL
GLUCOSE SERPL-MCNC: 103 MG/DL
HAV IGM SER QL: NONREACTIVE
HBV CORE IGG+IGM SER QL: REACTIVE
HBV CORE IGM SER QL: NONREACTIVE
HBV SURFACE AB SER QL: REACTIVE
HBV SURFACE AG SER QL: NONREACTIVE
HBV SURFACE AG SER QL: NONREACTIVE
HCV AB SER QL: NONREACTIVE
HCV S/CO RATIO: 0.5 S/CO
POTASSIUM SERPL-SCNC: 5.1 MMOL/L
PROT SERPL-MCNC: 7.8 G/DL
SODIUM SERPL-SCNC: 141 MMOL/L

## 2021-06-21 ENCOUNTER — NON-APPOINTMENT (OUTPATIENT)
Age: 81
End: 2021-06-21

## 2021-06-21 ENCOUNTER — RESULT REVIEW (OUTPATIENT)
Age: 81
End: 2021-06-21

## 2021-06-21 ENCOUNTER — APPOINTMENT (OUTPATIENT)
Dept: ENDOVASCULAR SURGERY | Facility: CLINIC | Age: 81
End: 2021-06-21
Payer: MEDICARE

## 2021-06-21 VITALS
DIASTOLIC BLOOD PRESSURE: 70 MMHG | WEIGHT: 165 LBS | RESPIRATION RATE: 16 BRPM | TEMPERATURE: 97.9 F | HEART RATE: 49 BPM | HEIGHT: 66 IN | SYSTOLIC BLOOD PRESSURE: 138 MMHG | OXYGEN SATURATION: 99 % | BODY MASS INDEX: 26.52 KG/M2

## 2021-06-21 PROCEDURE — 36561 INSERT TUNNELED CV CATH: CPT | Mod: RT

## 2021-06-21 PROCEDURE — 77001 FLUOROGUIDE FOR VEIN DEVICE: CPT

## 2021-06-21 PROCEDURE — 76937 US GUIDE VASCULAR ACCESS: CPT

## 2021-06-21 NOTE — PROCEDURE
[D/C IV on discharge] : D/C IV on discharge [Resume diet] : resume diet [Site check for bleeding/hematoma] : Site check for bleeding/hematoma [Vital signs on admission the q 15 mins x2] : Vital signs on admission the q 15 mins x2 [FreeTextEntry1] : mediport placement right

## 2021-06-21 NOTE — HISTORY OF PRESENT ILLNESS
[FreeTextEntry1] : received Covid vaccine\par alert and oriented\par no reported falls\par accompanied by daughter Tanika [phone]\par took BP meds this morning\par  [FreeTextEntry6] : Dr Pulido

## 2021-06-21 NOTE — ASSESSMENT
[FreeTextEntry1] : 80H needs port\par endom ca\par accompanied by daughter\par Informed consent obtained.\par Plan: port placement

## 2021-06-21 NOTE — REASON FOR VISIT
[Other ___] : a [unfilled] visit for [FreeTextEntry2] : Mediport placement for chemotherapy/endometrial cancer

## 2021-06-21 NOTE — PAST MEDICAL HISTORY
[Increasing age ( >40 years old)] : Increasing age ( >40 years old) [Malignancy] : Malignancy [No therapy indicated for cases scheduled for less than one hour] : No therapy indicated for cases scheduled for less than one hour. [FreeTextEntry1] : \par \par Malignant Hyperthermis (MH) Screening Tool and Risk of Bleeding Assessement\par Ms. COBY CAIN  denies family history of unexpected death following Anesthesia or Exercise.\par Denies Family history of Malignant Hyperthermia, Muscle or Neuromuscular disorder and High Temperature following exercise.\par Ms. COBY CAIN denies history of Muscle Spasm, Dark or Chocolate - Colored urine and Unanticipated fever immediately following anesthesia or serious exercise. \par Ms. CAIN  also denies bleeding tendencies/ Risks of Bleeding\par

## 2021-06-23 ENCOUNTER — NON-APPOINTMENT (OUTPATIENT)
Age: 81
End: 2021-06-23

## 2021-06-28 ENCOUNTER — RESULT REVIEW (OUTPATIENT)
Age: 81
End: 2021-06-28

## 2021-06-28 ENCOUNTER — APPOINTMENT (OUTPATIENT)
Dept: INFUSION THERAPY | Facility: HOSPITAL | Age: 81
End: 2021-06-28

## 2021-06-28 LAB
BASOPHILS # BLD AUTO: 0.03 K/UL — SIGNIFICANT CHANGE UP (ref 0–0.2)
BASOPHILS NFR BLD AUTO: 0.4 % — SIGNIFICANT CHANGE UP (ref 0–2)
EOSINOPHIL # BLD AUTO: 0.01 K/UL — SIGNIFICANT CHANGE UP (ref 0–0.5)
EOSINOPHIL NFR BLD AUTO: 0.1 % — SIGNIFICANT CHANGE UP (ref 0–6)
HCT VFR BLD CALC: 38.2 % — SIGNIFICANT CHANGE UP (ref 34.5–45)
HGB BLD-MCNC: 11.8 G/DL — SIGNIFICANT CHANGE UP (ref 11.5–15.5)
IMM GRANULOCYTES NFR BLD AUTO: 0.6 % — SIGNIFICANT CHANGE UP (ref 0–1.5)
LYMPHOCYTES # BLD AUTO: 0.89 K/UL — LOW (ref 1–3.3)
LYMPHOCYTES # BLD AUTO: 12.5 % — LOW (ref 13–44)
MCHC RBC-ENTMCNC: 26.4 PG — LOW (ref 27–34)
MCHC RBC-ENTMCNC: 30.9 G/DL — LOW (ref 32–36)
MCV RBC AUTO: 85.5 FL — SIGNIFICANT CHANGE UP (ref 80–100)
MONOCYTES # BLD AUTO: 0.1 K/UL — SIGNIFICANT CHANGE UP (ref 0–0.9)
MONOCYTES NFR BLD AUTO: 1.4 % — LOW (ref 2–14)
NEUTROPHILS # BLD AUTO: 6.06 K/UL — SIGNIFICANT CHANGE UP (ref 1.8–7.4)
NEUTROPHILS NFR BLD AUTO: 85 % — HIGH (ref 43–77)
NRBC # BLD: 0 /100 WBCS — SIGNIFICANT CHANGE UP (ref 0–0)
PLATELET # BLD AUTO: 269 K/UL — SIGNIFICANT CHANGE UP (ref 150–400)
RBC # BLD: 4.47 M/UL — SIGNIFICANT CHANGE UP (ref 3.8–5.2)
RBC # FLD: 14.4 % — SIGNIFICANT CHANGE UP (ref 10.3–14.5)
WBC # BLD: 7.13 K/UL — SIGNIFICANT CHANGE UP (ref 3.8–10.5)
WBC # FLD AUTO: 7.13 K/UL — SIGNIFICANT CHANGE UP (ref 3.8–10.5)

## 2021-06-28 PROCEDURE — 93010 ELECTROCARDIOGRAM REPORT: CPT

## 2021-06-29 ENCOUNTER — APPOINTMENT (OUTPATIENT)
Dept: GYNECOLOGIC ONCOLOGY | Facility: CLINIC | Age: 81
End: 2021-06-29
Payer: MEDICARE

## 2021-06-29 ENCOUNTER — NON-APPOINTMENT (OUTPATIENT)
Age: 81
End: 2021-06-29

## 2021-06-29 VITALS
HEIGHT: 66 IN | WEIGHT: 165 LBS | DIASTOLIC BLOOD PRESSURE: 67 MMHG | SYSTOLIC BLOOD PRESSURE: 123 MMHG | BODY MASS INDEX: 26.52 KG/M2 | OXYGEN SATURATION: 100 % | HEART RATE: 59 BPM

## 2021-06-29 DIAGNOSIS — Z51.11 ENCOUNTER FOR ANTINEOPLASTIC CHEMOTHERAPY: ICD-10-CM

## 2021-06-29 DIAGNOSIS — R11.2 NAUSEA WITH VOMITING, UNSPECIFIED: ICD-10-CM

## 2021-06-29 PROBLEM — C18.9 MALIGNANT NEOPLASM OF COLON, UNSPECIFIED: Chronic | Status: ACTIVE | Noted: 2021-06-22

## 2021-06-29 PROCEDURE — 99024 POSTOP FOLLOW-UP VISIT: CPT

## 2021-07-14 ENCOUNTER — RESULT REVIEW (OUTPATIENT)
Age: 81
End: 2021-07-14

## 2021-07-14 ENCOUNTER — APPOINTMENT (OUTPATIENT)
Dept: HEMATOLOGY ONCOLOGY | Facility: CLINIC | Age: 81
End: 2021-07-14
Payer: MEDICARE

## 2021-07-14 VITALS
DIASTOLIC BLOOD PRESSURE: 79 MMHG | HEIGHT: 66 IN | TEMPERATURE: 97.4 F | SYSTOLIC BLOOD PRESSURE: 159 MMHG | RESPIRATION RATE: 16 BRPM | HEART RATE: 80 BPM | WEIGHT: 163.58 LBS | OXYGEN SATURATION: 100 % | BODY MASS INDEX: 26.29 KG/M2

## 2021-07-14 LAB
BASOPHILS # BLD AUTO: 0 K/UL — SIGNIFICANT CHANGE UP (ref 0–0.2)
BASOPHILS NFR BLD AUTO: 0 % — SIGNIFICANT CHANGE UP (ref 0–2)
EOSINOPHIL # BLD AUTO: 0 K/UL — SIGNIFICANT CHANGE UP (ref 0–0.5)
EOSINOPHIL NFR BLD AUTO: 0 % — SIGNIFICANT CHANGE UP (ref 0–6)
HCT VFR BLD CALC: 37.8 % — SIGNIFICANT CHANGE UP (ref 34.5–45)
HGB BLD-MCNC: 11.5 G/DL — SIGNIFICANT CHANGE UP (ref 11.5–15.5)
LYMPHOCYTES # BLD AUTO: 1.87 K/UL — SIGNIFICANT CHANGE UP (ref 1–3.3)
LYMPHOCYTES # BLD AUTO: 40 % — SIGNIFICANT CHANGE UP (ref 13–44)
MCHC RBC-ENTMCNC: 26.3 PG — LOW (ref 27–34)
MCHC RBC-ENTMCNC: 30.4 G/DL — LOW (ref 32–36)
MCV RBC AUTO: 86.5 FL — SIGNIFICANT CHANGE UP (ref 80–100)
MONOCYTES # BLD AUTO: 0.61 K/UL — SIGNIFICANT CHANGE UP (ref 0–0.9)
MONOCYTES NFR BLD AUTO: 13 % — SIGNIFICANT CHANGE UP (ref 2–14)
NEUTROPHILS # BLD AUTO: 2.19 K/UL — SIGNIFICANT CHANGE UP (ref 1.8–7.4)
NEUTROPHILS NFR BLD AUTO: 47 % — SIGNIFICANT CHANGE UP (ref 43–77)
NRBC # BLD: 0 /100 — SIGNIFICANT CHANGE UP (ref 0–0)
NRBC # BLD: SIGNIFICANT CHANGE UP /100 WBCS (ref 0–0)
PLAT MORPH BLD: NORMAL — SIGNIFICANT CHANGE UP
PLATELET # BLD AUTO: 372 K/UL — SIGNIFICANT CHANGE UP (ref 150–400)
RBC # BLD: 4.37 M/UL — SIGNIFICANT CHANGE UP (ref 3.8–5.2)
RBC # FLD: 13.3 % — SIGNIFICANT CHANGE UP (ref 10.3–14.5)
RBC BLD AUTO: SIGNIFICANT CHANGE UP
WBC # BLD: 4.67 K/UL — SIGNIFICANT CHANGE UP (ref 3.8–10.5)
WBC # FLD AUTO: 4.67 K/UL — SIGNIFICANT CHANGE UP (ref 3.8–10.5)

## 2021-07-14 PROCEDURE — 99214 OFFICE O/P EST MOD 30 MIN: CPT

## 2021-07-15 ENCOUNTER — OUTPATIENT (OUTPATIENT)
Dept: OUTPATIENT SERVICES | Facility: HOSPITAL | Age: 81
LOS: 1 days | Discharge: HOME | End: 2021-07-15

## 2021-07-15 DIAGNOSIS — C54.1 MALIGNANT NEOPLASM OF ENDOMETRIUM: ICD-10-CM

## 2021-07-15 DIAGNOSIS — Z98.51 TUBAL LIGATION STATUS: Chronic | ICD-10-CM

## 2021-07-15 DIAGNOSIS — Z98.890 OTHER SPECIFIED POSTPROCEDURAL STATES: Chronic | ICD-10-CM

## 2021-07-15 LAB
ALBUMIN SERPL ELPH-MCNC: 4 G/DL
ALP BLD-CCNC: 119 U/L
ALT SERPL-CCNC: 18 U/L
ANION GAP SERPL CALC-SCNC: 12 MMOL/L
AST SERPL-CCNC: 17 U/L
BILIRUB SERPL-MCNC: 0.2 MG/DL
BUN SERPL-MCNC: 10 MG/DL
CALCIUM SERPL-MCNC: 10.5 MG/DL
CANCER AG125 SERPL-ACNC: 7 U/ML
CHLORIDE SERPL-SCNC: 101 MMOL/L
CO2 SERPL-SCNC: 26 MMOL/L
CREAT SERPL-MCNC: 0.99 MG/DL
GLUCOSE SERPL-MCNC: 128 MG/DL
MAGNESIUM SERPL-MCNC: 2.1 MG/DL
POTASSIUM SERPL-SCNC: 4.8 MMOL/L
PROT SERPL-MCNC: 7.1 G/DL
SODIUM SERPL-SCNC: 139 MMOL/L

## 2021-07-19 ENCOUNTER — RESULT REVIEW (OUTPATIENT)
Age: 81
End: 2021-07-19

## 2021-07-19 ENCOUNTER — APPOINTMENT (OUTPATIENT)
Dept: INFUSION THERAPY | Facility: HOSPITAL | Age: 81
End: 2021-07-19

## 2021-07-19 DIAGNOSIS — R11.2 NAUSEA WITH VOMITING, UNSPECIFIED: ICD-10-CM

## 2021-07-19 DIAGNOSIS — Z51.11 ENCOUNTER FOR ANTINEOPLASTIC CHEMOTHERAPY: ICD-10-CM

## 2021-07-19 LAB
BASOPHILS # BLD AUTO: 0.06 K/UL — SIGNIFICANT CHANGE UP (ref 0–0.2)
BASOPHILS NFR BLD AUTO: 1.1 % — SIGNIFICANT CHANGE UP (ref 0–2)
EOSINOPHIL # BLD AUTO: 0.05 K/UL — SIGNIFICANT CHANGE UP (ref 0–0.5)
EOSINOPHIL NFR BLD AUTO: 0.9 % — SIGNIFICANT CHANGE UP (ref 0–6)
HCT VFR BLD CALC: 37 % — SIGNIFICANT CHANGE UP (ref 34.5–45)
HGB BLD-MCNC: 11.5 G/DL — SIGNIFICANT CHANGE UP (ref 11.5–15.5)
IMM GRANULOCYTES NFR BLD AUTO: 1.8 % — HIGH (ref 0–1.5)
LYMPHOCYTES # BLD AUTO: 1.75 K/UL — SIGNIFICANT CHANGE UP (ref 1–3.3)
LYMPHOCYTES # BLD AUTO: 32.1 % — SIGNIFICANT CHANGE UP (ref 13–44)
MCHC RBC-ENTMCNC: 26.1 PG — LOW (ref 27–34)
MCHC RBC-ENTMCNC: 31.1 G/DL — LOW (ref 32–36)
MCV RBC AUTO: 84.1 FL — SIGNIFICANT CHANGE UP (ref 80–100)
MONOCYTES # BLD AUTO: 0.57 K/UL — SIGNIFICANT CHANGE UP (ref 0–0.9)
MONOCYTES NFR BLD AUTO: 10.4 % — SIGNIFICANT CHANGE UP (ref 2–14)
NEUTROPHILS # BLD AUTO: 2.93 K/UL — SIGNIFICANT CHANGE UP (ref 1.8–7.4)
NEUTROPHILS NFR BLD AUTO: 53.7 % — SIGNIFICANT CHANGE UP (ref 43–77)
NRBC # BLD: 0 /100 WBCS — SIGNIFICANT CHANGE UP (ref 0–0)
PLATELET # BLD AUTO: 418 K/UL — HIGH (ref 150–400)
RBC # BLD: 4.4 M/UL — SIGNIFICANT CHANGE UP (ref 3.8–5.2)
RBC # FLD: 13.8 % — SIGNIFICANT CHANGE UP (ref 10.3–14.5)
WBC # BLD: 5.46 K/UL — SIGNIFICANT CHANGE UP (ref 3.8–10.5)
WBC # FLD AUTO: 5.46 K/UL — SIGNIFICANT CHANGE UP (ref 3.8–10.5)

## 2021-07-20 ENCOUNTER — NON-APPOINTMENT (OUTPATIENT)
Age: 81
End: 2021-07-20

## 2021-07-28 ENCOUNTER — NON-APPOINTMENT (OUTPATIENT)
Age: 81
End: 2021-07-28

## 2021-07-29 ENCOUNTER — RESULT REVIEW (OUTPATIENT)
Age: 81
End: 2021-07-29

## 2021-07-29 ENCOUNTER — APPOINTMENT (OUTPATIENT)
Dept: HEMATOLOGY ONCOLOGY | Facility: CLINIC | Age: 81
End: 2021-07-29
Payer: MEDICARE

## 2021-07-29 VITALS
WEIGHT: 162.04 LBS | RESPIRATION RATE: 16 BRPM | OXYGEN SATURATION: 99 % | SYSTOLIC BLOOD PRESSURE: 178 MMHG | TEMPERATURE: 97.2 F | BODY MASS INDEX: 26.04 KG/M2 | HEIGHT: 66 IN | DIASTOLIC BLOOD PRESSURE: 78 MMHG | HEART RATE: 70 BPM

## 2021-07-29 LAB
BASOPHILS # BLD AUTO: 0.04 K/UL — SIGNIFICANT CHANGE UP (ref 0–0.2)
BASOPHILS NFR BLD AUTO: 1 % — SIGNIFICANT CHANGE UP (ref 0–2)
EOSINOPHIL # BLD AUTO: 0.05 K/UL — SIGNIFICANT CHANGE UP (ref 0–0.5)
EOSINOPHIL NFR BLD AUTO: 1.3 % — SIGNIFICANT CHANGE UP (ref 0–6)
HCT VFR BLD CALC: 36.9 % — SIGNIFICANT CHANGE UP (ref 34.5–45)
HGB BLD-MCNC: 11.4 G/DL — LOW (ref 11.5–15.5)
IMM GRANULOCYTES NFR BLD AUTO: 0 % — SIGNIFICANT CHANGE UP (ref 0–1.5)
LYMPHOCYTES # BLD AUTO: 1.44 K/UL — SIGNIFICANT CHANGE UP (ref 1–3.3)
LYMPHOCYTES # BLD AUTO: 37.5 % — SIGNIFICANT CHANGE UP (ref 13–44)
MCHC RBC-ENTMCNC: 26.2 PG — LOW (ref 27–34)
MCHC RBC-ENTMCNC: 30.9 G/DL — LOW (ref 32–36)
MCV RBC AUTO: 84.8 FL — SIGNIFICANT CHANGE UP (ref 80–100)
MONOCYTES # BLD AUTO: 0.38 K/UL — SIGNIFICANT CHANGE UP (ref 0–0.9)
MONOCYTES NFR BLD AUTO: 9.9 % — SIGNIFICANT CHANGE UP (ref 2–14)
NEUTROPHILS # BLD AUTO: 1.93 K/UL — SIGNIFICANT CHANGE UP (ref 1.8–7.4)
NEUTROPHILS NFR BLD AUTO: 50.3 % — SIGNIFICANT CHANGE UP (ref 43–77)
NRBC # BLD: 0 /100 WBCS — SIGNIFICANT CHANGE UP (ref 0–0)
PLATELET # BLD AUTO: 237 K/UL — SIGNIFICANT CHANGE UP (ref 150–400)
RBC # BLD: 4.35 M/UL — SIGNIFICANT CHANGE UP (ref 3.8–5.2)
RBC # FLD: 14.5 % — SIGNIFICANT CHANGE UP (ref 10.3–14.5)
WBC # BLD: 3.84 K/UL — SIGNIFICANT CHANGE UP (ref 3.8–10.5)
WBC # FLD AUTO: 3.84 K/UL — SIGNIFICANT CHANGE UP (ref 3.8–10.5)

## 2021-07-29 PROCEDURE — 99214 OFFICE O/P EST MOD 30 MIN: CPT

## 2021-07-30 LAB
ALBUMIN SERPL ELPH-MCNC: 4.4 G/DL
ALP BLD-CCNC: 101 U/L
ALT SERPL-CCNC: 13 U/L
ANION GAP SERPL CALC-SCNC: 13 MMOL/L
AST SERPL-CCNC: 15 U/L
BILIRUB SERPL-MCNC: 0.2 MG/DL
BUN SERPL-MCNC: 14 MG/DL
CALCIUM SERPL-MCNC: 10.5 MG/DL
CANCER AG125 SERPL-ACNC: 6 U/ML
CHLORIDE SERPL-SCNC: 104 MMOL/L
CO2 SERPL-SCNC: 24 MMOL/L
CREAT SERPL-MCNC: 0.93 MG/DL
GLUCOSE SERPL-MCNC: 126 MG/DL
MAGNESIUM SERPL-MCNC: 2 MG/DL
POTASSIUM SERPL-SCNC: 4 MMOL/L
PROT SERPL-MCNC: 7 G/DL
SODIUM SERPL-SCNC: 141 MMOL/L

## 2021-08-09 ENCOUNTER — RESULT REVIEW (OUTPATIENT)
Age: 81
End: 2021-08-09

## 2021-08-09 ENCOUNTER — APPOINTMENT (OUTPATIENT)
Dept: INFUSION THERAPY | Facility: HOSPITAL | Age: 81
End: 2021-08-09

## 2021-08-09 LAB
BASOPHILS # BLD AUTO: 0.04 K/UL — SIGNIFICANT CHANGE UP (ref 0–0.2)
BASOPHILS NFR BLD AUTO: 0.8 % — SIGNIFICANT CHANGE UP (ref 0–2)
EOSINOPHIL # BLD AUTO: 0.05 K/UL — SIGNIFICANT CHANGE UP (ref 0–0.5)
EOSINOPHIL NFR BLD AUTO: 0.9 % — SIGNIFICANT CHANGE UP (ref 0–6)
HCT VFR BLD CALC: 39.5 % — SIGNIFICANT CHANGE UP (ref 34.5–45)
HGB BLD-MCNC: 12.5 G/DL — SIGNIFICANT CHANGE UP (ref 11.5–15.5)
IMM GRANULOCYTES NFR BLD AUTO: 0.4 % — SIGNIFICANT CHANGE UP (ref 0–1.5)
LYMPHOCYTES # BLD AUTO: 1.58 K/UL — SIGNIFICANT CHANGE UP (ref 1–3.3)
LYMPHOCYTES # BLD AUTO: 29.7 % — SIGNIFICANT CHANGE UP (ref 13–44)
MCHC RBC-ENTMCNC: 26.6 PG — LOW (ref 27–34)
MCHC RBC-ENTMCNC: 31.6 G/DL — LOW (ref 32–36)
MCV RBC AUTO: 84 FL — SIGNIFICANT CHANGE UP (ref 80–100)
MONOCYTES # BLD AUTO: 0.63 K/UL — SIGNIFICANT CHANGE UP (ref 0–0.9)
MONOCYTES NFR BLD AUTO: 11.8 % — SIGNIFICANT CHANGE UP (ref 2–14)
NEUTROPHILS # BLD AUTO: 3 K/UL — SIGNIFICANT CHANGE UP (ref 1.8–7.4)
NEUTROPHILS NFR BLD AUTO: 56.4 % — SIGNIFICANT CHANGE UP (ref 43–77)
NRBC # BLD: 0 /100 WBCS — SIGNIFICANT CHANGE UP (ref 0–0)
PLATELET # BLD AUTO: 239 K/UL — SIGNIFICANT CHANGE UP (ref 150–400)
RBC # BLD: 4.7 M/UL — SIGNIFICANT CHANGE UP (ref 3.8–5.2)
RBC # FLD: 15.4 % — HIGH (ref 10.3–14.5)
WBC # BLD: 5.32 K/UL — SIGNIFICANT CHANGE UP (ref 3.8–10.5)
WBC # FLD AUTO: 5.32 K/UL — SIGNIFICANT CHANGE UP (ref 3.8–10.5)

## 2021-08-10 ENCOUNTER — NON-APPOINTMENT (OUTPATIENT)
Age: 81
End: 2021-08-10

## 2021-08-22 ENCOUNTER — OUTPATIENT (OUTPATIENT)
Dept: OUTPATIENT SERVICES | Facility: HOSPITAL | Age: 81
LOS: 1 days | Discharge: HOME | End: 2021-08-22

## 2021-08-22 DIAGNOSIS — Z98.890 OTHER SPECIFIED POSTPROCEDURAL STATES: Chronic | ICD-10-CM

## 2021-08-22 DIAGNOSIS — Z98.51 TUBAL LIGATION STATUS: Chronic | ICD-10-CM

## 2021-08-22 DIAGNOSIS — C54.1 MALIGNANT NEOPLASM OF ENDOMETRIUM: ICD-10-CM

## 2021-08-23 ENCOUNTER — RESULT REVIEW (OUTPATIENT)
Age: 81
End: 2021-08-23

## 2021-08-23 ENCOUNTER — APPOINTMENT (OUTPATIENT)
Dept: HEMATOLOGY ONCOLOGY | Facility: CLINIC | Age: 81
End: 2021-08-23
Payer: MEDICARE

## 2021-08-23 VITALS
SYSTOLIC BLOOD PRESSURE: 171 MMHG | RESPIRATION RATE: 16 BRPM | HEART RATE: 72 BPM | DIASTOLIC BLOOD PRESSURE: 98 MMHG | TEMPERATURE: 97.2 F | HEIGHT: 66 IN | BODY MASS INDEX: 26.15 KG/M2 | WEIGHT: 162.7 LBS | OXYGEN SATURATION: 99 %

## 2021-08-23 LAB
BASOPHILS # BLD AUTO: 0.01 K/UL — SIGNIFICANT CHANGE UP (ref 0–0.2)
BASOPHILS NFR BLD AUTO: 0.5 % — SIGNIFICANT CHANGE UP (ref 0–2)
EOSINOPHIL # BLD AUTO: 0.03 K/UL — SIGNIFICANT CHANGE UP (ref 0–0.5)
EOSINOPHIL NFR BLD AUTO: 1.4 % — SIGNIFICANT CHANGE UP (ref 0–6)
HCT VFR BLD CALC: 37.4 % — SIGNIFICANT CHANGE UP (ref 34.5–45)
HGB BLD-MCNC: 11.5 G/DL — SIGNIFICANT CHANGE UP (ref 11.5–15.5)
IMM GRANULOCYTES NFR BLD AUTO: 0.5 % — SIGNIFICANT CHANGE UP (ref 0–1.5)
LYMPHOCYTES # BLD AUTO: 1.34 K/UL — SIGNIFICANT CHANGE UP (ref 1–3.3)
LYMPHOCYTES # BLD AUTO: 60.6 % — HIGH (ref 13–44)
MCHC RBC-ENTMCNC: 26.2 PG — LOW (ref 27–34)
MCHC RBC-ENTMCNC: 30.7 G/DL — LOW (ref 32–36)
MCV RBC AUTO: 85.2 FL — SIGNIFICANT CHANGE UP (ref 80–100)
MONOCYTES # BLD AUTO: 0.5 K/UL — SIGNIFICANT CHANGE UP (ref 0–0.9)
MONOCYTES NFR BLD AUTO: 22.6 % — HIGH (ref 2–14)
NEUTROPHILS # BLD AUTO: 0.32 K/UL — LOW (ref 1.8–7.4)
NEUTROPHILS NFR BLD AUTO: 14.4 % — LOW (ref 43–77)
NRBC # BLD: 0 /100 WBCS — SIGNIFICANT CHANGE UP (ref 0–0)
PLATELET # BLD AUTO: 220 K/UL — SIGNIFICANT CHANGE UP (ref 150–400)
RBC # BLD: 4.39 M/UL — SIGNIFICANT CHANGE UP (ref 3.8–5.2)
RBC # FLD: 15.7 % — HIGH (ref 10.3–14.5)
WBC # BLD: 2.21 K/UL — LOW (ref 3.8–10.5)
WBC # FLD AUTO: 2.21 K/UL — LOW (ref 3.8–10.5)

## 2021-08-23 PROCEDURE — 99213 OFFICE O/P EST LOW 20 MIN: CPT

## 2021-08-23 RX ORDER — NEOMYCIN SULFATE 500 MG/1
500 TABLET ORAL
Qty: 6 | Refills: 0 | Status: DISCONTINUED | COMMUNITY
Start: 2021-05-18 | End: 2021-08-23

## 2021-08-23 RX ORDER — METRONIDAZOLE 250 MG/1
250 TABLET ORAL
Qty: 3 | Refills: 0 | Status: DISCONTINUED | COMMUNITY
Start: 2021-05-18 | End: 2021-08-23

## 2021-08-23 RX ORDER — DEXAMETHASONE 4 MG/1
4 TABLET ORAL
Qty: 10 | Refills: 0 | Status: DISCONTINUED | COMMUNITY
Start: 2021-06-17 | End: 2021-08-23

## 2021-08-23 RX ORDER — SODIUM SULFATE, POTASSIUM SULFATE, MAGNESIUM SULFATE 17.5; 3.13; 1.6 G/ML; G/ML; G/ML
17.5-3.13-1.6 SOLUTION, CONCENTRATE ORAL
Qty: 1 | Refills: 0 | Status: DISCONTINUED | COMMUNITY
Start: 2021-05-17 | End: 2021-08-23

## 2021-08-24 ENCOUNTER — APPOINTMENT (OUTPATIENT)
Dept: INFUSION THERAPY | Facility: HOSPITAL | Age: 81
End: 2021-08-24

## 2021-08-24 ENCOUNTER — NON-APPOINTMENT (OUTPATIENT)
Age: 81
End: 2021-08-24

## 2021-08-24 LAB
ALBUMIN SERPL ELPH-MCNC: 4.3 G/DL
ALP BLD-CCNC: 120 U/L
ALT SERPL-CCNC: 17 U/L
ANION GAP SERPL CALC-SCNC: 11 MMOL/L
AST SERPL-CCNC: 16 U/L
BILIRUB SERPL-MCNC: 0.3 MG/DL
BUN SERPL-MCNC: 16 MG/DL
CALCIUM SERPL-MCNC: 10.6 MG/DL
CANCER AG125 SERPL-ACNC: 6 U/ML
CHLORIDE SERPL-SCNC: 104 MMOL/L
CO2 SERPL-SCNC: 24 MMOL/L
CREAT SERPL-MCNC: 0.86 MG/DL
GLUCOSE SERPL-MCNC: 110 MG/DL
MAGNESIUM SERPL-MCNC: 2.1 MG/DL
POTASSIUM SERPL-SCNC: 4.5 MMOL/L
PROT SERPL-MCNC: 6.8 G/DL
SODIUM SERPL-SCNC: 140 MMOL/L

## 2021-08-25 ENCOUNTER — APPOINTMENT (OUTPATIENT)
Dept: INFUSION THERAPY | Facility: HOSPITAL | Age: 81
End: 2021-08-25

## 2021-08-25 DIAGNOSIS — Z51.89 ENCOUNTER FOR OTHER SPECIFIED AFTERCARE: ICD-10-CM

## 2021-08-26 ENCOUNTER — APPOINTMENT (OUTPATIENT)
Dept: INFUSION THERAPY | Facility: HOSPITAL | Age: 81
End: 2021-08-26

## 2021-08-30 ENCOUNTER — RESULT REVIEW (OUTPATIENT)
Age: 81
End: 2021-08-30

## 2021-08-30 ENCOUNTER — NON-APPOINTMENT (OUTPATIENT)
Age: 81
End: 2021-08-30

## 2021-08-30 ENCOUNTER — APPOINTMENT (OUTPATIENT)
Dept: INFUSION THERAPY | Facility: HOSPITAL | Age: 81
End: 2021-08-30

## 2021-08-30 LAB
BASOPHILS # BLD AUTO: 0.04 K/UL — SIGNIFICANT CHANGE UP (ref 0–0.2)
BASOPHILS NFR BLD AUTO: 1 % — SIGNIFICANT CHANGE UP (ref 0–2)
EOSINOPHIL # BLD AUTO: 0.09 K/UL — SIGNIFICANT CHANGE UP (ref 0–0.5)
EOSINOPHIL NFR BLD AUTO: 2.1 % — SIGNIFICANT CHANGE UP (ref 0–6)
HCT VFR BLD CALC: 38.7 % — SIGNIFICANT CHANGE UP (ref 34.5–45)
HGB BLD-MCNC: 12.4 G/DL — SIGNIFICANT CHANGE UP (ref 11.5–15.5)
IMM GRANULOCYTES NFR BLD AUTO: 1.9 % — HIGH (ref 0–1.5)
LYMPHOCYTES # BLD AUTO: 1.58 K/UL — SIGNIFICANT CHANGE UP (ref 1–3.3)
LYMPHOCYTES # BLD AUTO: 37.6 % — SIGNIFICANT CHANGE UP (ref 13–44)
MCHC RBC-ENTMCNC: 26.9 PG — LOW (ref 27–34)
MCHC RBC-ENTMCNC: 32 G/DL — SIGNIFICANT CHANGE UP (ref 32–36)
MCV RBC AUTO: 83.9 FL — SIGNIFICANT CHANGE UP (ref 80–100)
MONOCYTES # BLD AUTO: 0.71 K/UL — SIGNIFICANT CHANGE UP (ref 0–0.9)
MONOCYTES NFR BLD AUTO: 16.9 % — HIGH (ref 2–14)
NEUTROPHILS # BLD AUTO: 1.7 K/UL — LOW (ref 1.8–7.4)
NEUTROPHILS NFR BLD AUTO: 40.5 % — LOW (ref 43–77)
NRBC # BLD: 0 /100 WBCS — SIGNIFICANT CHANGE UP (ref 0–0)
PLATELET # BLD AUTO: 217 K/UL — SIGNIFICANT CHANGE UP (ref 150–400)
RBC # BLD: 4.61 M/UL — SIGNIFICANT CHANGE UP (ref 3.8–5.2)
RBC # FLD: 16.8 % — HIGH (ref 10.3–14.5)
WBC # BLD: 4.2 K/UL — SIGNIFICANT CHANGE UP (ref 3.8–10.5)
WBC # FLD AUTO: 4.2 K/UL — SIGNIFICANT CHANGE UP (ref 3.8–10.5)

## 2021-08-30 RX ORDER — ACETAMINOPHEN 500 MG
2 TABLET ORAL
Qty: 0 | Refills: 0 | DISCHARGE

## 2021-08-30 RX ORDER — AMLODIPINE BESYLATE 2.5 MG/1
1 TABLET ORAL
Qty: 0 | Refills: 0 | DISCHARGE

## 2021-08-30 RX ORDER — ATORVASTATIN CALCIUM 80 MG/1
1 TABLET, FILM COATED ORAL
Qty: 0 | Refills: 0 | DISCHARGE

## 2021-08-30 RX ORDER — METFORMIN HYDROCHLORIDE 850 MG/1
1 TABLET ORAL
Qty: 0 | Refills: 0 | DISCHARGE

## 2021-08-30 RX ORDER — LEVOTHYROXINE SODIUM 125 MCG
1 TABLET ORAL
Qty: 0 | Refills: 0 | DISCHARGE

## 2021-08-30 RX ORDER — METOPROLOL TARTRATE 50 MG
1 TABLET ORAL
Qty: 0 | Refills: 0 | DISCHARGE

## 2021-08-31 DIAGNOSIS — R11.2 NAUSEA WITH VOMITING, UNSPECIFIED: ICD-10-CM

## 2021-09-07 ENCOUNTER — APPOINTMENT (OUTPATIENT)
Dept: SURGICAL ONCOLOGY | Facility: CLINIC | Age: 81
End: 2021-09-07
Payer: MEDICARE

## 2021-09-07 VITALS
SYSTOLIC BLOOD PRESSURE: 170 MMHG | HEART RATE: 83 BPM | DIASTOLIC BLOOD PRESSURE: 78 MMHG | BODY MASS INDEX: 25.43 KG/M2 | HEIGHT: 67 IN | WEIGHT: 162 LBS

## 2021-09-07 PROCEDURE — 99213 OFFICE O/P EST LOW 20 MIN: CPT

## 2021-09-09 ENCOUNTER — LABORATORY RESULT (OUTPATIENT)
Age: 81
End: 2021-09-09

## 2021-09-09 ENCOUNTER — APPOINTMENT (OUTPATIENT)
Dept: HEMATOLOGY ONCOLOGY | Facility: CLINIC | Age: 81
End: 2021-09-09
Payer: MEDICARE

## 2021-09-09 NOTE — PHYSICAL EXAM
[FreeTextEntry1] : Abdomen: soft, nontender/nondistended.  Incisions clean and well healed. [Normal] : supple, no neck mass and thyroid not enlarged [Normal Neck Lymph Nodes] : normal neck lymph nodes  [Normal Supraclavicular Lymph Nodes] : normal supraclavicular lymph nodes [Normal Groin Lymph Nodes] : normal groin lymph nodes [Normal Axillary Lymph Nodes] : normal axillary lymph nodes [Normal] : oriented to person, place and time, with appropriate affect

## 2021-09-09 NOTE — HISTORY OF PRESENT ILLNESS
[de-identified] : Patient is s/p combined robotic RASHEEDA/BSO/PPALND and right colectomy 05/20/2021.\par She feels well and has minimal pain.\par Tolerating diet well.\par Pathology: right colectomy specimen shows 4 mm invasive moderately-differentiated adenocarcinoma arising from a large tubulovillous adenoma.  Staging T1N0 (0/12).\par \par 09/07/2021: Patient is currently undergoing adjuvant treatment for endometrial cancer.  She reports decreased appetite with treatment, but overall tolerating well.  Her weight has been stable.

## 2021-09-09 NOTE — ASSESSMENT
[FreeTextEntry1] : Doing well.\par To complete adjuvant treatment for endometrial cancer.\par Follow up exam in one year.

## 2021-09-15 ENCOUNTER — RESULT REVIEW (OUTPATIENT)
Age: 81
End: 2021-09-15

## 2021-09-15 ENCOUNTER — LABORATORY RESULT (OUTPATIENT)
Age: 81
End: 2021-09-15

## 2021-09-15 ENCOUNTER — APPOINTMENT (OUTPATIENT)
Dept: HEMATOLOGY ONCOLOGY | Facility: CLINIC | Age: 81
End: 2021-09-15
Payer: MEDICARE

## 2021-09-15 ENCOUNTER — APPOINTMENT (OUTPATIENT)
Dept: INFUSION THERAPY | Facility: HOSPITAL | Age: 81
End: 2021-09-15

## 2021-09-15 VITALS
BODY MASS INDEX: 25.21 KG/M2 | WEIGHT: 160.92 LBS | TEMPERATURE: 97.7 F | HEART RATE: 63 BPM | OXYGEN SATURATION: 99 % | RESPIRATION RATE: 16 BRPM | SYSTOLIC BLOOD PRESSURE: 168 MMHG | DIASTOLIC BLOOD PRESSURE: 84 MMHG

## 2021-09-15 LAB
BASOPHILS # BLD AUTO: 0.03 K/UL — SIGNIFICANT CHANGE UP (ref 0–0.2)
BASOPHILS NFR BLD AUTO: 0.4 % — SIGNIFICANT CHANGE UP (ref 0–2)
EOSINOPHIL # BLD AUTO: 0.09 K/UL — SIGNIFICANT CHANGE UP (ref 0–0.5)
EOSINOPHIL NFR BLD AUTO: 1.3 % — SIGNIFICANT CHANGE UP (ref 0–6)
HCT VFR BLD CALC: 33.5 % — LOW (ref 34.5–45)
HGB BLD-MCNC: 10.4 G/DL — LOW (ref 11.5–15.5)
IMM GRANULOCYTES NFR BLD AUTO: 0.4 % — SIGNIFICANT CHANGE UP (ref 0–1.5)
LYMPHOCYTES # BLD AUTO: 1.5 K/UL — SIGNIFICANT CHANGE UP (ref 1–3.3)
LYMPHOCYTES # BLD AUTO: 21.2 % — SIGNIFICANT CHANGE UP (ref 13–44)
MCHC RBC-ENTMCNC: 26.7 PG — LOW (ref 27–34)
MCHC RBC-ENTMCNC: 31 G/DL — LOW (ref 32–36)
MCV RBC AUTO: 86.1 FL — SIGNIFICANT CHANGE UP (ref 80–100)
MONOCYTES # BLD AUTO: 1 K/UL — HIGH (ref 0–0.9)
MONOCYTES NFR BLD AUTO: 14.2 % — HIGH (ref 2–14)
NEUTROPHILS # BLD AUTO: 4.41 K/UL — SIGNIFICANT CHANGE UP (ref 1.8–7.4)
NEUTROPHILS NFR BLD AUTO: 62.5 % — SIGNIFICANT CHANGE UP (ref 43–77)
NRBC # BLD: 0 /100 WBCS — SIGNIFICANT CHANGE UP (ref 0–0)
PLATELET # BLD AUTO: 288 K/UL — SIGNIFICANT CHANGE UP (ref 150–400)
RBC # BLD: 3.89 M/UL — SIGNIFICANT CHANGE UP (ref 3.8–5.2)
RBC # FLD: 18.5 % — HIGH (ref 10.3–14.5)
WBC # BLD: 7.06 K/UL — SIGNIFICANT CHANGE UP (ref 3.8–10.5)
WBC # FLD AUTO: 7.06 K/UL — SIGNIFICANT CHANGE UP (ref 3.8–10.5)

## 2021-09-15 PROCEDURE — 99213 OFFICE O/P EST LOW 20 MIN: CPT

## 2021-09-20 ENCOUNTER — NON-APPOINTMENT (OUTPATIENT)
Age: 81
End: 2021-09-20

## 2021-09-20 ENCOUNTER — RESULT REVIEW (OUTPATIENT)
Age: 81
End: 2021-09-20

## 2021-09-20 ENCOUNTER — APPOINTMENT (OUTPATIENT)
Dept: INFUSION THERAPY | Facility: HOSPITAL | Age: 81
End: 2021-09-20

## 2021-09-20 LAB
BASOPHILS # BLD AUTO: 0.05 K/UL — SIGNIFICANT CHANGE UP (ref 0–0.2)
BASOPHILS NFR BLD AUTO: 0.8 % — SIGNIFICANT CHANGE UP (ref 0–2)
EOSINOPHIL # BLD AUTO: 0.06 K/UL — SIGNIFICANT CHANGE UP (ref 0–0.5)
EOSINOPHIL NFR BLD AUTO: 1 % — SIGNIFICANT CHANGE UP (ref 0–6)
HCT VFR BLD CALC: 33.6 % — LOW (ref 34.5–45)
HGB BLD-MCNC: 10.6 G/DL — LOW (ref 11.5–15.5)
IMM GRANULOCYTES NFR BLD AUTO: 0.5 % — SIGNIFICANT CHANGE UP (ref 0–1.5)
LYMPHOCYTES # BLD AUTO: 1.35 K/UL — SIGNIFICANT CHANGE UP (ref 1–3.3)
LYMPHOCYTES # BLD AUTO: 22.4 % — SIGNIFICANT CHANGE UP (ref 13–44)
MCHC RBC-ENTMCNC: 27 PG — SIGNIFICANT CHANGE UP (ref 27–34)
MCHC RBC-ENTMCNC: 31.5 G/DL — LOW (ref 32–36)
MCV RBC AUTO: 85.7 FL — SIGNIFICANT CHANGE UP (ref 80–100)
MONOCYTES # BLD AUTO: 0.95 K/UL — HIGH (ref 0–0.9)
MONOCYTES NFR BLD AUTO: 15.7 % — HIGH (ref 2–14)
NEUTROPHILS # BLD AUTO: 3.6 K/UL — SIGNIFICANT CHANGE UP (ref 1.8–7.4)
NEUTROPHILS NFR BLD AUTO: 59.6 % — SIGNIFICANT CHANGE UP (ref 43–77)
NRBC # BLD: 0 /100 WBCS — SIGNIFICANT CHANGE UP (ref 0–0)
PLATELET # BLD AUTO: 264 K/UL — SIGNIFICANT CHANGE UP (ref 150–400)
RBC # BLD: 3.92 M/UL — SIGNIFICANT CHANGE UP (ref 3.8–5.2)
RBC # FLD: 18.6 % — HIGH (ref 10.3–14.5)
WBC # BLD: 6.04 K/UL — SIGNIFICANT CHANGE UP (ref 3.8–10.5)
WBC # FLD AUTO: 6.04 K/UL — SIGNIFICANT CHANGE UP (ref 3.8–10.5)

## 2021-09-21 ENCOUNTER — NON-APPOINTMENT (OUTPATIENT)
Age: 81
End: 2021-09-21

## 2021-09-23 ENCOUNTER — RESULT REVIEW (OUTPATIENT)
Age: 81
End: 2021-09-23

## 2021-09-28 ENCOUNTER — OUTPATIENT (OUTPATIENT)
Dept: OUTPATIENT SERVICES | Facility: HOSPITAL | Age: 81
LOS: 1 days | Discharge: HOME | End: 2021-09-28

## 2021-09-28 DIAGNOSIS — Z98.51 TUBAL LIGATION STATUS: Chronic | ICD-10-CM

## 2021-09-28 DIAGNOSIS — C54.1 MALIGNANT NEOPLASM OF ENDOMETRIUM: ICD-10-CM

## 2021-09-28 DIAGNOSIS — Z98.890 OTHER SPECIFIED POSTPROCEDURAL STATES: Chronic | ICD-10-CM

## 2021-09-29 ENCOUNTER — APPOINTMENT (OUTPATIENT)
Dept: CT IMAGING | Facility: IMAGING CENTER | Age: 81
End: 2021-09-29
Payer: MEDICARE

## 2021-09-29 ENCOUNTER — OUTPATIENT (OUTPATIENT)
Dept: OUTPATIENT SERVICES | Facility: HOSPITAL | Age: 81
LOS: 1 days | End: 2021-09-29
Payer: COMMERCIAL

## 2021-09-29 DIAGNOSIS — C54.1 MALIGNANT NEOPLASM OF ENDOMETRIUM: ICD-10-CM

## 2021-09-29 DIAGNOSIS — Z98.890 OTHER SPECIFIED POSTPROCEDURAL STATES: Chronic | ICD-10-CM

## 2021-09-29 DIAGNOSIS — Z98.51 TUBAL LIGATION STATUS: Chronic | ICD-10-CM

## 2021-09-29 PROCEDURE — 74177 CT ABD & PELVIS W/CONTRAST: CPT | Mod: 26

## 2021-09-29 PROCEDURE — 74177 CT ABD & PELVIS W/CONTRAST: CPT

## 2021-09-29 PROCEDURE — 71260 CT THORAX DX C+: CPT | Mod: 26

## 2021-09-29 PROCEDURE — 71260 CT THORAX DX C+: CPT

## 2021-09-30 ENCOUNTER — RESULT REVIEW (OUTPATIENT)
Age: 81
End: 2021-09-30

## 2021-09-30 ENCOUNTER — LABORATORY RESULT (OUTPATIENT)
Age: 81
End: 2021-09-30

## 2021-09-30 ENCOUNTER — APPOINTMENT (OUTPATIENT)
Dept: HEMATOLOGY ONCOLOGY | Facility: CLINIC | Age: 81
End: 2021-09-30
Payer: MEDICARE

## 2021-09-30 ENCOUNTER — APPOINTMENT (OUTPATIENT)
Dept: INFUSION THERAPY | Facility: HOSPITAL | Age: 81
End: 2021-09-30

## 2021-09-30 ENCOUNTER — NON-APPOINTMENT (OUTPATIENT)
Age: 81
End: 2021-09-30

## 2021-09-30 VITALS
SYSTOLIC BLOOD PRESSURE: 174 MMHG | TEMPERATURE: 97.2 F | HEIGHT: 67.01 IN | DIASTOLIC BLOOD PRESSURE: 77 MMHG | OXYGEN SATURATION: 100 % | BODY MASS INDEX: 25.4 KG/M2 | WEIGHT: 161.82 LBS | RESPIRATION RATE: 16 BRPM | HEART RATE: 63 BPM

## 2021-09-30 LAB
BASOPHILS # BLD AUTO: 0 K/UL — SIGNIFICANT CHANGE UP (ref 0–0.2)
BASOPHILS NFR BLD AUTO: 0 % — SIGNIFICANT CHANGE UP (ref 0–2)
EOSINOPHIL # BLD AUTO: 0.29 K/UL — SIGNIFICANT CHANGE UP (ref 0–0.5)
EOSINOPHIL NFR BLD AUTO: 2 % — SIGNIFICANT CHANGE UP (ref 0–6)
HCT VFR BLD CALC: 33.9 % — LOW (ref 34.5–45)
HGB BLD-MCNC: 10.5 G/DL — LOW (ref 11.5–15.5)
LYMPHOCYTES # BLD AUTO: 18 % — SIGNIFICANT CHANGE UP (ref 13–44)
LYMPHOCYTES # BLD AUTO: 2.57 K/UL — SIGNIFICANT CHANGE UP (ref 1–3.3)
MCHC RBC-ENTMCNC: 27.5 PG — SIGNIFICANT CHANGE UP (ref 27–34)
MCHC RBC-ENTMCNC: 31 G/DL — LOW (ref 32–36)
MCV RBC AUTO: 88.7 FL — SIGNIFICANT CHANGE UP (ref 80–100)
METAMYELOCYTES # FLD: 2 % — HIGH (ref 0–0)
MONOCYTES # BLD AUTO: 1.86 K/UL — HIGH (ref 0–0.9)
MONOCYTES NFR BLD AUTO: 13 % — SIGNIFICANT CHANGE UP (ref 2–14)
MYELOCYTES NFR BLD: 2 % — HIGH (ref 0–0)
NEUTROPHILS # BLD AUTO: 8.86 K/UL — HIGH (ref 1.8–7.4)
NEUTROPHILS NFR BLD AUTO: 62 % — SIGNIFICANT CHANGE UP (ref 43–77)
NRBC # BLD: 1 /100 — HIGH (ref 0–0)
NRBC # BLD: SIGNIFICANT CHANGE UP /100 WBCS (ref 0–0)
PLAT MORPH BLD: NORMAL — SIGNIFICANT CHANGE UP
PLATELET # BLD AUTO: 200 K/UL — SIGNIFICANT CHANGE UP (ref 150–400)
PROMYELOCYTES # FLD: 1 % — HIGH (ref 0–0)
RBC # BLD: 3.82 M/UL — SIGNIFICANT CHANGE UP (ref 3.8–5.2)
RBC # FLD: 19.8 % — HIGH (ref 10.3–14.5)
RBC BLD AUTO: SIGNIFICANT CHANGE UP
WBC # BLD: 14.29 K/UL — HIGH (ref 3.8–10.5)
WBC # FLD AUTO: 14.29 K/UL — HIGH (ref 3.8–10.5)

## 2021-09-30 PROCEDURE — 99214 OFFICE O/P EST MOD 30 MIN: CPT

## 2021-10-04 ENCOUNTER — APPOINTMENT (OUTPATIENT)
Dept: HEMATOLOGY ONCOLOGY | Facility: CLINIC | Age: 81
End: 2021-10-04
Payer: MEDICARE

## 2021-10-04 PROCEDURE — 99442: CPT

## 2021-10-05 ENCOUNTER — APPOINTMENT (OUTPATIENT)
Dept: INFUSION THERAPY | Facility: HOSPITAL | Age: 81
End: 2021-10-05

## 2021-10-07 ENCOUNTER — APPOINTMENT (OUTPATIENT)
Dept: RADIATION ONCOLOGY | Facility: CLINIC | Age: 81
End: 2021-10-07
Payer: MEDICARE

## 2021-10-07 VITALS
TEMPERATURE: 97.88 F | DIASTOLIC BLOOD PRESSURE: 73 MMHG | WEIGHT: 162.04 LBS | HEART RATE: 60 BPM | OXYGEN SATURATION: 100 % | BODY MASS INDEX: 25.37 KG/M2 | SYSTOLIC BLOOD PRESSURE: 153 MMHG | RESPIRATION RATE: 16 BRPM

## 2021-10-07 PROCEDURE — 99203 OFFICE O/P NEW LOW 30 MIN: CPT

## 2021-10-07 NOTE — VITALS
[Least Pain Intensity: 0/10] : 0/10 [70: Cares for self; unalbe to carry on normal activity or do active work.] : 70: Cares for self; unable to carry on normal activity or do active work. [Maximal Pain Intensity: 7/10] : 7/10 [Pain Location: ___] : Pain Location: [unfilled] [Adjuvant (neuroleptics)] : adjuvant (neuroleptics) [4 - Distress Level] : Distress Level: 4

## 2021-10-07 NOTE — REASON FOR VISIT
[Consideration of Curative Therapy] : consideration of curative therapy for [Endometrial Cancer] : endometrial cancer [Other: _____] : [unfilled]

## 2021-10-11 ENCOUNTER — RESULT REVIEW (OUTPATIENT)
Age: 81
End: 2021-10-11

## 2021-10-11 ENCOUNTER — APPOINTMENT (OUTPATIENT)
Dept: INFUSION THERAPY | Facility: HOSPITAL | Age: 81
End: 2021-10-11

## 2021-10-11 LAB
BASOPHILS # BLD AUTO: 0.03 K/UL — SIGNIFICANT CHANGE UP (ref 0–0.2)
BASOPHILS NFR BLD AUTO: 0.6 % — SIGNIFICANT CHANGE UP (ref 0–2)
EOSINOPHIL # BLD AUTO: 0.05 K/UL — SIGNIFICANT CHANGE UP (ref 0–0.5)
EOSINOPHIL NFR BLD AUTO: 1 % — SIGNIFICANT CHANGE UP (ref 0–6)
HCT VFR BLD CALC: 30.7 % — LOW (ref 34.5–45)
HGB BLD-MCNC: 9.9 G/DL — LOW (ref 11.5–15.5)
IMM GRANULOCYTES NFR BLD AUTO: 0.6 % — SIGNIFICANT CHANGE UP (ref 0–1.5)
LYMPHOCYTES # BLD AUTO: 1.13 K/UL — SIGNIFICANT CHANGE UP (ref 1–3.3)
LYMPHOCYTES # BLD AUTO: 22.5 % — SIGNIFICANT CHANGE UP (ref 13–44)
MCHC RBC-ENTMCNC: 28.7 PG — SIGNIFICANT CHANGE UP (ref 27–34)
MCHC RBC-ENTMCNC: 32.2 G/DL — SIGNIFICANT CHANGE UP (ref 32–36)
MCV RBC AUTO: 89 FL — SIGNIFICANT CHANGE UP (ref 80–100)
MONOCYTES # BLD AUTO: 0.77 K/UL — SIGNIFICANT CHANGE UP (ref 0–0.9)
MONOCYTES NFR BLD AUTO: 15.3 % — HIGH (ref 2–14)
NEUTROPHILS # BLD AUTO: 3.02 K/UL — SIGNIFICANT CHANGE UP (ref 1.8–7.4)
NEUTROPHILS NFR BLD AUTO: 60 % — SIGNIFICANT CHANGE UP (ref 43–77)
NRBC # BLD: 0 /100 WBCS — SIGNIFICANT CHANGE UP (ref 0–0)
PLATELET # BLD AUTO: 188 K/UL — SIGNIFICANT CHANGE UP (ref 150–400)
RBC # BLD: 3.45 M/UL — LOW (ref 3.8–5.2)
RBC # FLD: 20.3 % — HIGH (ref 10.3–14.5)
WBC # BLD: 5.03 K/UL — SIGNIFICANT CHANGE UP (ref 3.8–10.5)
WBC # FLD AUTO: 5.03 K/UL — SIGNIFICANT CHANGE UP (ref 3.8–10.5)

## 2021-10-11 NOTE — END OF VISIT
[Patient Optimized for Surgery] : Patient optimized for surgery [No Further Testing Recommended] : no further testing recommended [Continue medications as is] : Continue current medications [As per surgery] : as per surgery [FreeTextEntry4] : 74-year-old female currently medically stable with no contraindication to planned surgical procedure.\par \par Patient with multiple PACs but asymptomatic with good exercise tolerance and asymptomatic with no history of palpitations or syncope.\par Patient with mild CAD seen on CTA currently not clinically significant.\par \par Status post cardiac preoperative evaluation and patient cleared.\par  [Time Spent: ___ minutes] : I have spent [unfilled] minutes of time on the encounter.

## 2021-10-11 NOTE — HISTORY OF PRESENT ILLNESS
[FreeTextEntry1] : 79yo F with prior stage I colon cancer s/p hemicolectomy now with stage IIIC1 clear cell endometrial carcinoma s/p RASHEEDA BSO with Dr Cox on May 20th 2021 followed by 5/6 cycles adjuvant carbo + taxol 9/30/21 with Dr Jeffries. Final path with 40% MMI,CSI+, LVI+, 1/1 obturator LN, 4/4 left common iliac LNs, no MARIANN. No PA ally sampling. [id number] Stage IIIC1\par \par PMB noted jan 2021\par \par 2/4/21 pelvic US with enlarged uterus, 2.4cm EMS\par 3/27/21 pelvic MRI with external iliac LAD, 13cm fibroid uterus\par 4/29/21 D&C showed serous and focal clear cell carcinoma, p53 mut, p16+\par 5/15/21 CT chest abd pel with large left thyroid nodule mildly narrowing the trachea, partially cystic/solid, calcified uterine myomas 5cm, ileocecal intussusception with 5.8cm soft tissue mass lead point which may represent tumor, and para aortic & pelvic LAD, 1.6cm distal left PA LN, 1.7cm left common iliac LN\par \par 5/20/21 hemicolectomy revealed g2 adenocarcinoma, 0/12 LN, invading submucosa. MMR intact, pT1N0\par \par 5/20/21 RASHEEDA/BSO showed g3 clear cell EMC, 40% MMI,CSI+, LVI+, with ITCs in right pelvic LN, 1.5cm tumor in left obturator LN, 4/4 left common iliac LNs, no MARIANN. No PA ally sampling. Pelvic wash negative. 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 Denies abdominal pain, urinary burning, blood in stool, hematuria. Takes lovenox. Last chemo planned for Monday 10/11/21\par \par \par

## 2021-10-11 NOTE — DISEASE MANAGEMENT
[Pathological] : TNM Stage: p [IIIC] : IIIC [FreeTextEntry4] : at least IIIC1 [TTNM] : 2 [NTNM] : 1a [MTNM] : x

## 2021-10-11 NOTE — PHYSICAL EXAM
[Normal] : well developed, well nourished, in no acute distress [Sclera] : the sclera and conjunctiva were normal [] : no respiratory distress [Exaggerated Use Of Accessory Muscles For Inspiration] : no accessory muscle use [Edema] : no peripheral edema present [General Appearance - Alert] : alert [General Appearance - In No Acute Distress] : in no acute distress [Motor Tone] : muscle strength and tone were normal [Skin Color & Pigmentation] : normal skin color and pigmentation [Oriented To Time, Place, And Person] : oriented to person, place, and time [de-identified] : GURVINDER [de-identified] :  port

## 2021-10-11 NOTE — REVIEW OF SYSTEMS
[Fatigue] : fatigue [Loss of Hearing] : loss of hearing [Constipation] : constipation [Nocturia] : nocturia [Joint Pain] : joint pain [Anxiety] : anxiety [Negative] : Heme/Lymph [Fever] : no fever [Chills] : no chills [Night Sweats] : no night sweats [Eye Pain] : no eye pain [Visual Disturbances] : no visual disturbances [Chest Pain] : no chest pain [Palpitations] : no palpitations [Leg Claudication] : no intermittent leg claudication [Lower Ext Edema] : no lower extremity edema [Cough] : no cough [Shortness Of Breath] : no shortness of breath [Abdominal Pain] : no abdominal pain [Vomiting] : no vomiting [Diarrhea] : no diarrhea [FreeTextEntry4] : GURVINDER [de-identified] : peripheral neuropathy

## 2021-10-12 ENCOUNTER — NON-APPOINTMENT (OUTPATIENT)
Age: 81
End: 2021-10-12

## 2021-10-12 DIAGNOSIS — R11.2 NAUSEA WITH VOMITING, UNSPECIFIED: ICD-10-CM

## 2021-10-12 DIAGNOSIS — Z51.11 ENCOUNTER FOR ANTINEOPLASTIC CHEMOTHERAPY: ICD-10-CM

## 2021-10-12 DIAGNOSIS — Z51.89 ENCOUNTER FOR OTHER SPECIFIED AFTERCARE: ICD-10-CM

## 2021-10-25 ENCOUNTER — APPOINTMENT (OUTPATIENT)
Dept: GYNECOLOGIC ONCOLOGY | Facility: CLINIC | Age: 81
End: 2021-10-25
Payer: MEDICARE

## 2021-10-25 VITALS
HEART RATE: 86 BPM | WEIGHT: 160 LBS | BODY MASS INDEX: 25.11 KG/M2 | DIASTOLIC BLOOD PRESSURE: 84 MMHG | SYSTOLIC BLOOD PRESSURE: 152 MMHG | HEIGHT: 67.01 IN

## 2021-10-25 PROCEDURE — 99213 OFFICE O/P EST LOW 20 MIN: CPT

## 2021-10-25 NOTE — ASSESSMENT
[FreeTextEntry1] : Ms. 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 In addition, the patient is s/p R micki-colectomy by Dr. Reaves and found to have stage I colon cancer. \par \par I reviewed with the patient and her daughter Carin in detail today, the findings from her oncologic workup including the surgical pathology findings from her recent surgery, and imaging studies. I explained that these demonstrate stage IIIC HG clear cell endometrial cancer. In addition, she was found to have stage I colon cancer s/p hemicolectomy. We reviewed the recommendations for systemic chemotherapy and EBRT given the high risk of disease recurrence in this setting. I explained the rationale, potential benefits and risks of chemotherapy, as well as the possible side effects and toxicities of combination treatment with carboplatin and paclitaxel. These include but are not limited to: fatigue, nausea, vomiting, decreased appetite, alopecia, diarrhea, constipation, body aches, neuropathy, cytopenias with increased risk of infections, renal dysfunction, liver toxicities and allergic reactions. We also discussed the possible need for growth factors and supportive transfusions. We will plan for a total of 6 cycles of chemotherapy. \par \par She is also recommended for EBRT/VBT with radiation oncology and I will make a referral for her. We reviewed that RT is usually administered sequentially after chemotherapy is completed in this setting. \par \par In regards to her stage I colon cancer, we discussed that no adjuvant treatment is recommended in this setting. She will f/u with Dr. Reaves for continued monitoring, and will need 1-year surveillance colonoscopy. \par \par After a lengthy discussion, the patient demonstrated good understanding of the above and is in agreement with the plan of care as outlined. The patient consented to chemotherapy as discussed above.  \par \par Plan:\par -prechemotherapy bloodwork/EKG\par -chemotherapy consent obtained for carboplatin and paclitaxel \par -chemotherapy teaching provided \par -tentatively scheduled to start chemotherapy in 2 weeks\par -referral to radiation oncology for EBRT/VBT, Dr. Shirley \par -f/u Dr. Cox in gyn onc clinic for post-op care \par -f/u Dr. Reaves in surgical oncology for stage I colon cancer \par \par All of the patient and her daughter's questions and concerns were addressed in full. She knows to call my office should there be any other issues or concerns.\par

## 2021-10-25 NOTE — REASON FOR VISIT
[Initial Consultation] : an initial consultation [Family Member] : family member [FreeTextEntry2] : stage IIIC high grade clear cell endometrial carcinoma

## 2021-10-25 NOTE — PHYSICAL EXAM
[Restricted in physically strenuous activity but ambulatory and able to carry out work of a light or sedentary nature] : Status 1- Restricted in physically strenuous activity but ambulatory and able to carry out work of a light or sedentary nature, e.g., light house work, office work [Normal] : affect appropriate [de-identified] : surgical incisions c/d/i

## 2021-10-25 NOTE — HISTORY OF PRESENT ILLNESS
[de-identified] : Referred by Dr. Cox \par \par Ms. 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 In addition, the patient is s/p micki-colectomy by Dr. Reaves and found to have stage I colon cancer.\par \par The patient reports that she felt well until January 25th 2021 when she had an episode of vaginal bleeding. She has never had post-menopausal bleeding prior to this episode. She presented to local urgent care and subsequently underwent further workup at [facility]. Her oncologic workup is summarized below:\par \par 2/4/21 pelvic US with markedly enlarged uterus, d/t fibroids 13 x 8 x 9 cm\par Endometrial lining 24mm, b/l ovaries not visualized\par \par 3/27/21 pelvic MRI with external iliac LAD measuring 1.7x1.7, with a 9x6mm LN, 13cm fibroid uterus\par \par 4/29/21 D&C\par  EMC- endometrial adenocarcinoma, high grade predominantly c/w serous carcinom and focal areas concerning for clear cell carcinoma\par \par 5/15/21 CT CAP: large left thyroid nodule mildly narrowing the trachea, partially cystic/solid, calcified uterine myomas 5cm, ileocecal intussusception with 5.8cm soft tissue mass lead point which may represent tumor, and para aortic & pelvic LAD, 1.6cm distal left PA LN, 1.7cm left common iliac LN\par \par She was subseuqently referred to gyn onc evaluation and saw Dr. Cox. On 5/20/21, she underwent RASHEEDA/BSO staging surgery which revealed grade 3 clear cell endometrial carcinoma, 40% myometrial invasion, CSI+, LVI+, with ITCs in the right pelvic LN, 1.5cm tumor in left obturator LN, 4/4 left common iliac LNs. No PA ally sampling. Pelvic wash was negative. Final surgical stage [id number], Stage IIIC1. \par \par In addition, she underwent hemicolectomy with Dr. Osiel Reaves on 5/20/21.\par Surgical pathology: right colectomy specimen shows 4 mm invasive moderately-differentiated adenocarcinoma arising from a large tubulovillous adenoma. Staging I T1N0 (0/12 nodes). MMR intact protein expression. \par \par She has been referred to medical oncology for consideration of adjuvant chemotherapy for her endometrial cancer. She feels well overall and has been recovering from her surgery without significant issues. She is eating and drinking without n/v. She is having regular BMs. She denies fevers, chills, n/v, abdominal pain, neuropathy, vaginal discharge or bleeding, urinary symptoms, cough, CP, SOB.\par \par \par PMHx: DM, HTN, hypothyroid \par Meds: metformin, levothyroxine, lipitor, norvasc\par \par Family Hx of Cancer: n/a\par SH: Denies smoking or alcohol use; \par lives with daughter Carin, , has 4 adult children\par \par HM\par Pap- n/a \par Mammo- n/a\par Colonoscopy- 5/16/21

## 2021-10-26 NOTE — HISTORY OF PRESENT ILLNESS
[Disease: _____________________] : Disease: [unfilled] [AJCC Stage: ____] : AJCC Stage: [unfilled] [de-identified] : Referred by Dr. Cox \par \par Ms. 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 In addition, the patient is s/p micki-colectomy by Dr. Reaves and found to have stage I colon cancer.\par \par The patient reports that she felt well until January 25th 2021 when she had an episode of vaginal bleeding. She has never had post-menopausal bleeding prior to this episode. She presented to local urgent care and subsequently underwent further workup at [facility]. Her oncologic workup is summarized below:\par \par 2/4/21 pelvic US with markedly enlarged uterus, d/t fibroids 13 x 8 x 9 cm\par Endometrial lining 24mm, b/l ovaries not visualized\par \par 3/27/21 pelvic MRI with external iliac LAD measuring 1.7x1.7, with a 9x6mm LN, 13cm fibroid uterus\par \par 4/29/21 D&C\par  EMC- endometrial adenocarcinoma, high grade predominantly c/w serous carcinom and focal areas concerning for clear cell carcinoma\par \par 5/15/21 CT CAP: large left thyroid nodule mildly narrowing the trachea, partially cystic/solid, calcified uterine myomas 5cm, ileocecal intussusception with 5.8cm soft tissue mass lead point which may represent tumor, and para aortic & pelvic LAD, 1.6cm distal left PA LN, 1.7cm left common iliac LN\par \par She was subseuqently referred to gyn onc evaluation and saw Dr. Cox. On 5/20/21, she underwent RASHEEDA/BSO staging surgery which revealed grade 3 clear cell endometrial carcinoma, 40% myometrial invasion, CSI+, LVI+, with ITCs in the right pelvic LN, 1.5cm tumor in left obturator LN, 4/4 left common iliac LNs. No PA ally sampling. Pelvic wash was negative. Final surgical stage [id number], Stage IIIC1. \par \par In addition, she underwent hemicolectomy with Dr. Osiel Reaves on 5/20/21.\par Surgical pathology: right colectomy specimen shows 4 mm invasive moderately-differentiated adenocarcinoma arising from a large tubulovillous adenoma. Staging I T1N0 (0/12 nodes). MMR intact protein expression. \par \par She has been referred to medical oncology for consideration of adjuvant chemotherapy for her endometrial cancer. She feels well overall and has been recovering from her surgery without significant issues. She is eating and drinking without n/v. She is having regular BMs. She denies fevers, chills, n/v, abdominal pain, neuropathy, vaginal discharge or bleeding, urinary symptoms, cough, CP, SOB.\par \par \par PMHx: DM, HTN, hypothyroid \par Meds: metformin, levothyroxine, lipitor, norvasc\par \par Family Hx of Cancer: n/a\par SH: Denies smoking or alcohol use; \par lives with daughter Carin, , has 4 adult children\par \par HM\par Pap- n/a \par Mammo- n/a\par Colonoscopy- 5/16/21. \par \par \par \par  [de-identified] : stage I colon cancer. [FreeTextEntry1] : surgery on 5/20/21 --> adjuvant Carbo/Taxol June 28, 2021 s/p cycle 3/6 [de-identified] : Ximena comes in for follow up after cycle 3 carbo/taxol. She feels well overall but states her L knee has been hurting since she tried to stoop down to urinate.  She is ambulating well and is able to bear weight on her legs but continues to have some discomfort, although it is better. She is eating better and continues to have grade 1 fatigue.  She has improvement in her neuropathy although sometimes has L foot pinky numbness intermittently.  She denies any mouth sores, CP, palpitations, cough, JOHN, n/v/d/c, abdominal pain, LE edema, dizziness, vaginal discharge or bleeding, urinary symptoms, weight/appetite changes and keeps active. \par \par \par \par

## 2021-10-26 NOTE — ASSESSMENT
[FreeTextEntry1] : Ms. 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 In addition, the patient is s/p R micki-colectomy by Dr. Reaves and found to have stage I colon cancer. \par \par She is currently undergoing adjuvant carbo/taxol chemotherapy for her stage IIIC endometrial cancer.\par She is s/p C2 and she continues to tolerate it well overall. She has grade 1 neuropathy involving her leg/feet.\par \par We discussed ways to manage side effects and toxicities associated with chemotherapy in detail, including fatigue, decreased appetite, nausea, myalgias and bowel regimen with supportive care and medications.\par \par Plan:\par -interim bloodwork today\par -proceed with C3 carbo/taxol as scheduled \par -continue gabapentin for G1 neuropathic pain  \par -f/u radiation oncology for EBRT/VBT, Dr. Shirley \par -f/u Dr. Cox in gyn onc clinic for post-op care \par -f/u Dr. Reaves in surgical oncology for stage I colon cancer \par \par All of the patient and her daughter's questions and concerns were addressed in full. She knows to call my office should there be any other issues or concerns.\par

## 2021-10-26 NOTE — PHYSICAL EXAM
[Restricted in physically strenuous activity but ambulatory and able to carry out work of a light or sedentary nature] : Status 1- Restricted in physically strenuous activity but ambulatory and able to carry out work of a light or sedentary nature, e.g., light house work, office work [Normal] : affect appropriate [de-identified] : surgical incisions c/d/i

## 2021-10-26 NOTE — HISTORY OF PRESENT ILLNESS
[Disease: _____________________] : Disease: [unfilled] [AJCC Stage: ____] : AJCC Stage: [unfilled] [de-identified] : Referred by Dr. Cox \par \par Ms. 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 In addition, the patient is s/p micki-colectomy by Dr. Reaves and found to have stage I colon cancer.\par \par The patient reports that she felt well until January 25th 2021 when she had an episode of vaginal bleeding. She has never had post-menopausal bleeding prior to this episode. She presented to local urgent care and subsequently underwent further workup at [facility]. Her oncologic workup is summarized below:\par \par 2/4/21 pelvic US with markedly enlarged uterus, d/t fibroids 13 x 8 x 9 cm\par Endometrial lining 24mm, b/l ovaries not visualized\par \par 3/27/21 pelvic MRI with external iliac LAD measuring 1.7x1.7, with a 9x6mm LN, 13cm fibroid uterus\par \par 4/29/21 D&C\par  EMC- endometrial adenocarcinoma, high grade predominantly c/w serous carcinom and focal areas concerning for clear cell carcinoma\par \par 5/15/21 CT CAP: large left thyroid nodule mildly narrowing the trachea, partially cystic/solid, calcified uterine myomas 5cm, ileocecal intussusception with 5.8cm soft tissue mass lead point which may represent tumor, and para aortic & pelvic LAD, 1.6cm distal left PA LN, 1.7cm left common iliac LN\par \par She was subseuqently referred to gyn onc evaluation and saw Dr. Cox. On 5/20/21, she underwent RASHEEDA/BSO staging surgery which revealed grade 3 clear cell endometrial carcinoma, 40% myometrial invasion, CSI+, LVI+, with ITCs in the right pelvic LN, 1.5cm tumor in left obturator LN, 4/4 left common iliac LNs. No PA ally sampling. Pelvic wash was negative. Final surgical stage [id number], Stage IIIC1. \par \par In addition, she underwent hemicolectomy with Dr. Osiel Reaves on 5/20/21.\par Surgical pathology: right colectomy specimen shows 4 mm invasive moderately-differentiated adenocarcinoma arising from a large tubulovillous adenoma. Staging I T1N0 (0/12 nodes). MMR intact protein expression. \par \par She has been referred to medical oncology for consideration of adjuvant chemotherapy for her endometrial cancer. She feels well overall and has been recovering from her surgery without significant issues. She is eating and drinking without n/v. She is having regular BMs. She denies fevers, chills, n/v, abdominal pain, neuropathy, vaginal discharge or bleeding, urinary symptoms, cough, CP, SOB.\par \par \par PMHx: DM, HTN, hypothyroid \par Meds: metformin, levothyroxine, lipitor, norvasc\par \par Family Hx of Cancer: n/a\par SH: Denies smoking or alcohol use; \par lives with daughter Carin, , has 4 adult children\par \par HM\par Pap- n/a \par Mammo- n/a\par Colonoscopy- 5/16/21. \par \par \par \par  [de-identified] : stage I colon cancer. [FreeTextEntry1] : surgery on 5/20/21 --> adjuvant Carbo/Taxol June 28, 2021 s/p cycle 5/6 [de-identified] : Ximena comes in for follow up after cycle 5 and had CT scans yesterday (report pending).  She is seeing Dr Shirley on 10/7/2021 to plan adjuvant radiation. She feels anxious about radiation, has numbness in L foot which sometimes interferes with her ambulation.  She denies any mouth sores, CP, palpitations, cough, JOHN, n/v/d/c, abdominal pain, LE edema, vaginal discharge or bleeding, urinary symptoms, excessive bruising, dizziness, headaches, arthralgias, myalgias, weight/appetite changes and tries to keeps active.  Her BP is elevated again but she has white coat HTN.  \par \par \par \par

## 2021-10-26 NOTE — PHYSICAL EXAM
[Restricted in physically strenuous activity but ambulatory and able to carry out work of a light or sedentary nature] : Status 1- Restricted in physically strenuous activity but ambulatory and able to carry out work of a light or sedentary nature, e.g., light house work, office work [Normal] : affect appropriate [de-identified] : surgical incisions c/d/i  General

## 2021-10-26 NOTE — REVIEW OF SYSTEMS
[Joint Pain] : joint pain [Negative] : Allergic/Immunologic [Fatigue] : fatigue [Constipation] : constipation [Recent Change In Weight] : ~T no recent weight change [Lower Ext Edema] : no lower extremity edema [SOB on Exertion] : no shortness of breath during exertion [Diarrhea] : no diarrhea [de-identified] : neuropathy

## 2021-10-26 NOTE — PHYSICAL EXAM
[Restricted in physically strenuous activity but ambulatory and able to carry out work of a light or sedentary nature] : Status 1- Restricted in physically strenuous activity but ambulatory and able to carry out work of a light or sedentary nature, e.g., light house work, office work [Normal] : affect appropriate [de-identified] : surgical incisions c/d/i

## 2021-10-26 NOTE — ASSESSMENT
[FreeTextEntry1] : Ms. 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 In addition, the patient is s/p R micki-colectomy by Dr. Reaves and found to have stage I colon cancer. \par \par She is currently undergoing adjuvant carbo/taxol chemotherapy for her stage IIIC endometrial cancer.\par She is s/p C1 and so far tolerating it well without significant toxicities. We discussed ways to manage side effects and toxicities associated with chemotherapy in detail, including fatigue, decreased appetite, nausea, myalgias and bowel regimen with supportive care and medications.\par \par Plan:\par -interim bloodwork today\par -proceed with C2 carbo/taxol as scheduled \par -referral to radiation oncology for EBRT/VBT, Dr. Shirley \par -f/u Dr. Cox in gyn onc clinic for post-op care \par -f/u Dr. Reaves in surgical oncology for stage I colon cancer \par \par All of the patient and her daughter's questions and concerns were addressed in full. She knows to call my office should there be any other issues or concerns.\par

## 2021-10-26 NOTE — HISTORY OF PRESENT ILLNESS
[de-identified] : Referred by Dr. Cox \par \par Ms. 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 In addition, the patient is s/p micki-colectomy by Dr. Reaves and found to have stage I colon cancer.\par \par The patient reports that she felt well until January 25th 2021 when she had an episode of vaginal bleeding. She has never had post-menopausal bleeding prior to this episode. She presented to local urgent care and subsequently underwent further workup at [facility]. Her oncologic workup is summarized below:\par \par 2/4/21 pelvic US with markedly enlarged uterus, d/t fibroids 13 x 8 x 9 cm\par Endometrial lining 24mm, b/l ovaries not visualized\par \par 3/27/21 pelvic MRI with external iliac LAD measuring 1.7x1.7, with a 9x6mm LN, 13cm fibroid uterus\par \par 4/29/21 D&C\par  EMC- endometrial adenocarcinoma, high grade predominantly c/w serous carcinom and focal areas concerning for clear cell carcinoma\par \par 5/15/21 CT CAP: large left thyroid nodule mildly narrowing the trachea, partially cystic/solid, calcified uterine myomas 5cm, ileocecal intussusception with 5.8cm soft tissue mass lead point which may represent tumor, and para aortic & pelvic LAD, 1.6cm distal left PA LN, 1.7cm left common iliac LN\par \par She was subseuqently referred to gyn onc evaluation and saw Dr. Cox. On 5/20/21, she underwent RASHEEDA/BSO staging surgery which revealed grade 3 clear cell endometrial carcinoma, 40% myometrial invasion, CSI+, LVI+, with ITCs in the right pelvic LN, 1.5cm tumor in left obturator LN, 4/4 left common iliac LNs. No PA ally sampling. Pelvic wash was negative. Final surgical stage [id number], Stage IIIC1. \par \par In addition, she underwent hemicolectomy with Dr. Osiel Reaves on 5/20/21.\par Surgical pathology: right colectomy specimen shows 4 mm invasive moderately-differentiated adenocarcinoma arising from a large tubulovillous adenoma. Staging I T1N0 (0/12 nodes). MMR intact protein expression. \par \par She has been referred to medical oncology for consideration of adjuvant chemotherapy for her endometrial cancer. She feels well overall and has been recovering from her surgery without significant issues. She is eating and drinking without n/v. She is having regular BMs. She denies fevers, chills, n/v, abdominal pain, neuropathy, vaginal discharge or bleeding, urinary symptoms, cough, CP, SOB.\par \par \par PMHx: DM, HTN, hypothyroid \par Meds: metformin, levothyroxine, lipitor, norvasc\par \par Family Hx of Cancer: n/a\par SH: Denies smoking or alcohol use; \par lives with daughter Carin, , has 4 adult children\par \par HM\par Pap- n/a \par Mammo- n/a\par Colonoscopy- 5/16/21 [de-identified] : Patient presents for follow up s/p C2 carbo/taxol. She continues to feel very well overall.\par She again had grade 1 fatigue and constipation, improved with prune juice, and is now having regular BMs.\par She also experienced increased neuropathic pain and feeling of "pins and needles" involving the L foot, which radiated up to her L knee. Analgesics were not too helpful, but she is now using gabapentin with much improvement.; \par \par She denies fevers, chills, n/v, abdominal pain, neuropathy, vaginal discharge or bleeding, urinary symptoms, cough, CP, SOB.

## 2021-10-26 NOTE — PHYSICAL EXAM
[Restricted in physically strenuous activity but ambulatory and able to carry out work of a light or sedentary nature] : Status 1- Restricted in physically strenuous activity but ambulatory and able to carry out work of a light or sedentary nature, e.g., light house work, office work [Normal] : affect appropriate [de-identified] : surgical incisions c/d/i

## 2021-10-26 NOTE — REASON FOR VISIT
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

## 2021-10-26 NOTE — PHYSICAL EXAM
[Restricted in physically strenuous activity but ambulatory and able to carry out work of a light or sedentary nature] : Status 1- Restricted in physically strenuous activity but ambulatory and able to carry out work of a light or sedentary nature, e.g., light house work, office work [Normal] : normal appearance, no rash, nodules, vesicles, ulcers, erythema [de-identified] : surgical incisions c/d/i

## 2021-10-26 NOTE — HISTORY OF PRESENT ILLNESS
[de-identified] : Referred by Dr. Cox \par \par Ms. 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 In addition, the patient is s/p micki-colectomy by Dr. Reaves and found to have stage I colon cancer.\par \par The patient reports that she felt well until January 25th 2021 when she had an episode of vaginal bleeding. She has never had post-menopausal bleeding prior to this episode. She presented to local urgent care and subsequently underwent further workup at [facility]. Her oncologic workup is summarized below:\par \par 2/4/21 pelvic US with markedly enlarged uterus, d/t fibroids 13 x 8 x 9 cm\par Endometrial lining 24mm, b/l ovaries not visualized\par \par 3/27/21 pelvic MRI with external iliac LAD measuring 1.7x1.7, with a 9x6mm LN, 13cm fibroid uterus\par \par 4/29/21 D&C\par  EMC- endometrial adenocarcinoma, high grade predominantly c/w serous carcinom and focal areas concerning for clear cell carcinoma\par \par 5/15/21 CT CAP: large left thyroid nodule mildly narrowing the trachea, partially cystic/solid, calcified uterine myomas 5cm, ileocecal intussusception with 5.8cm soft tissue mass lead point which may represent tumor, and para aortic & pelvic LAD, 1.6cm distal left PA LN, 1.7cm left common iliac LN\par \par She was subseuqently referred to gyn onc evaluation and saw Dr. Cox. On 5/20/21, she underwent RASHEEDA/BSO staging surgery which revealed grade 3 clear cell endometrial carcinoma, 40% myometrial invasion, CSI+, LVI+, with ITCs in the right pelvic LN, 1.5cm tumor in left obturator LN, 4/4 left common iliac LNs. No PA ally sampling. Pelvic wash was negative. Final surgical stage [id number], Stage IIIC1. \par \par In addition, she underwent hemicolectomy with Dr. Osiel Reaves on 5/20/21.\par Surgical pathology: right colectomy specimen shows 4 mm invasive moderately-differentiated adenocarcinoma arising from a large tubulovillous adenoma. Staging I T1N0 (0/12 nodes). MMR intact protein expression. \par \par She has been referred to medical oncology for consideration of adjuvant chemotherapy for her endometrial cancer. She feels well overall and has been recovering from her surgery without significant issues. She is eating and drinking without n/v. She is having regular BMs. She denies fevers, chills, n/v, abdominal pain, neuropathy, vaginal discharge or bleeding, urinary symptoms, cough, CP, SOB.\par \par \par PMHx: DM, HTN, hypothyroid \par Meds: metformin, levothyroxine, lipitor, norvasc\par \par Family Hx of Cancer: n/a\par SH: Denies smoking or alcohol use; \par lives with daughter Carin, , has 4 adult children\par \par HM\par Pap- n/a \par Mammo- n/a\par Colonoscopy- 5/16/21 [de-identified] : Patient presents for follow up s/p C1 carbo/taxol.\par She feels very well overall. She had grade 1 fatigue and constipation, did not take bowel regimen but improved with prune juice. She is now having regular BMs.\par She also experienced tingling in the legs b/l, during the first week but now completely resolved.\par Hair is starting to fall out from the head. She is eating/drinking without n/v.\par She denies fevers, chills, n/v, abdominal pain, neuropathy, vaginal discharge or bleeding, urinary symptoms, cough, CP, SOB.\par

## 2021-10-27 ENCOUNTER — NON-APPOINTMENT (OUTPATIENT)
Age: 81
End: 2021-10-27

## 2021-10-27 ENCOUNTER — RESULT REVIEW (OUTPATIENT)
Age: 81
End: 2021-10-27

## 2021-10-27 ENCOUNTER — LABORATORY RESULT (OUTPATIENT)
Age: 81
End: 2021-10-27

## 2021-10-27 ENCOUNTER — APPOINTMENT (OUTPATIENT)
Dept: HEMATOLOGY ONCOLOGY | Facility: CLINIC | Age: 81
End: 2021-10-27
Payer: MEDICARE

## 2021-10-27 ENCOUNTER — APPOINTMENT (OUTPATIENT)
Dept: INFUSION THERAPY | Facility: HOSPITAL | Age: 81
End: 2021-10-27

## 2021-10-27 VITALS
OXYGEN SATURATION: 99 % | HEIGHT: 67.01 IN | RESPIRATION RATE: 16 BRPM | SYSTOLIC BLOOD PRESSURE: 159 MMHG | HEART RATE: 70 BPM | BODY MASS INDEX: 24.78 KG/M2 | TEMPERATURE: 97.7 F | WEIGHT: 157.85 LBS | DIASTOLIC BLOOD PRESSURE: 79 MMHG

## 2021-10-27 LAB
BASOPHILS # BLD AUTO: 0.03 K/UL — SIGNIFICANT CHANGE UP (ref 0–0.2)
BASOPHILS NFR BLD AUTO: 0.8 % — SIGNIFICANT CHANGE UP (ref 0–2)
EOSINOPHIL # BLD AUTO: 0.05 K/UL — SIGNIFICANT CHANGE UP (ref 0–0.5)
EOSINOPHIL NFR BLD AUTO: 1.3 % — SIGNIFICANT CHANGE UP (ref 0–6)
HCT VFR BLD CALC: 32.7 % — LOW (ref 34.5–45)
HGB BLD-MCNC: 10.1 G/DL — LOW (ref 11.5–15.5)
IMM GRANULOCYTES NFR BLD AUTO: 0.5 % — SIGNIFICANT CHANGE UP (ref 0–1.5)
LYMPHOCYTES # BLD AUTO: 1.18 K/UL — SIGNIFICANT CHANGE UP (ref 1–3.3)
LYMPHOCYTES # BLD AUTO: 29.9 % — SIGNIFICANT CHANGE UP (ref 13–44)
MCHC RBC-ENTMCNC: 28.9 PG — SIGNIFICANT CHANGE UP (ref 27–34)
MCHC RBC-ENTMCNC: 30.9 G/DL — LOW (ref 32–36)
MCV RBC AUTO: 93.7 FL — SIGNIFICANT CHANGE UP (ref 80–100)
MONOCYTES # BLD AUTO: 0.65 K/UL — SIGNIFICANT CHANGE UP (ref 0–0.9)
MONOCYTES NFR BLD AUTO: 16.5 % — HIGH (ref 2–14)
NEUTROPHILS # BLD AUTO: 2.02 K/UL — SIGNIFICANT CHANGE UP (ref 1.8–7.4)
NEUTROPHILS NFR BLD AUTO: 51 % — SIGNIFICANT CHANGE UP (ref 43–77)
NRBC # BLD: 0 /100 WBCS — SIGNIFICANT CHANGE UP (ref 0–0)
PLATELET # BLD AUTO: 205 K/UL — SIGNIFICANT CHANGE UP (ref 150–400)
RBC # BLD: 3.49 M/UL — LOW (ref 3.8–5.2)
RBC # FLD: 19.5 % — HIGH (ref 10.3–14.5)
WBC # BLD: 3.95 K/UL — SIGNIFICANT CHANGE UP (ref 3.8–10.5)
WBC # FLD AUTO: 3.95 K/UL — SIGNIFICANT CHANGE UP (ref 3.8–10.5)

## 2021-10-27 PROCEDURE — 99214 OFFICE O/P EST MOD 30 MIN: CPT

## 2021-10-28 ENCOUNTER — OUTPATIENT (OUTPATIENT)
Dept: OUTPATIENT SERVICES | Facility: HOSPITAL | Age: 81
LOS: 1 days | Discharge: HOME | End: 2021-10-28
Payer: MEDICARE

## 2021-10-28 DIAGNOSIS — Z98.51 TUBAL LIGATION STATUS: Chronic | ICD-10-CM

## 2021-10-28 DIAGNOSIS — Z98.890 OTHER SPECIFIED POSTPROCEDURAL STATES: Chronic | ICD-10-CM

## 2021-10-28 PROCEDURE — 77263 THER RADIOLOGY TX PLNG CPLX: CPT

## 2021-10-28 NOTE — HISTORY OF PRESENT ILLNESS
[Disease: _____________________] : Disease: [unfilled] [AJCC Stage: ____] : AJCC Stage: [unfilled] [de-identified] : Referred by Dr. Cox \par \par Ms. 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 In addition, the patient is s/p micki-colectomy by Dr. Reaves and found to have stage I colon cancer.\par \par The patient reports that she felt well until January 25th 2021 when she had an episode of vaginal bleeding. She has never had post-menopausal bleeding prior to this episode.\par \par \par  [de-identified] : stage I colon cancer. [FreeTextEntry1] : surgery on 5/20/21 --> adjuvant Carbo/Taxol June 28, 2021 s/p cycle 6/6 on 10/11/2021 [de-identified] : Ms. 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 Her  normalized. Plan for radiation EBRT 50.4gy/28fx extended field due to common iliac node +, and plan to follow with VBT 12gy/2 due to LSI. Plan to begin 3-4 weeks after last chemo to allow for washout. Patient plans however to complete radiation at [facility] however after discussion she wishes to maintain with [facility]. Today patient states she is in her usual state of health. No complaints. Does endorse some neuropathy in her LLE. Otherwise no headaches, change in vision, mouth sores/pain, chest pain, sob, abd pain, n/v/d/c, LE pain. Does have some left knee swelling that comes and goes. No dysuria, vaginal discharge/bleeding/pain. \par \par

## 2021-10-28 NOTE — ASSESSMENT
[Curative] : Goals of care discussed with patient: Curative [Palliative Care Plan] : not applicable at this time [FreeTextEntry1] : Ms. Vasquez is here for follow up of her Stage IIIC high grade clear cell endometrial cancer, s/p RASHEEDA/BSO on 5/20/21 with hemcolectomy and stage 1 colon cancer found, s/p adjuvent carbo/taxol 6 cycles completed on 10/25/21 with CT on 9/29/21 showing no evidence of disease but multiple clots including RLL segmental PE, additional acute SMV and PV thrombus with evidence of cavernous transfomration, acute R gonadal vein thrombus but no e/o metastatic disease. Her  normalized. Plan for radiation EBRT 50.4gy/28fx extended field due to common iliac node +, and plan to follow with VBT 12gy/2 due to LSI. Plan to begin 3-4 weeks after last chemo to allow for washout. \par \par 1. Stage IIIC High Grade Clear Cell Endometrial Cancer \par - s/p RASHEEDA/BSO on 5/20/21 with hemicolectomy and stage 1 colon cancer found, s/p adjuvant carbo/taxol 6 cycles completed on 10/25/21\par - blood work today including  \par - Plan for EBRT 50.4gy/28fx extended field due to common iliac node + followed by VBT 12gy/2 due to LSI \par - After discussion, in order to prevent delay in care the patient will proceed with radiation at [facility]. Discussed with Dr. Shirley- will reach out to schedule an appointment \par - gabapentin renewed for neuropathy of LLE\par - continue f/u with Dr Cox in gyn onc clinic \par - RTC after RT \par \par 2. DVT\par - CT on 9/29/21 showing no evidence of disease but multiple clots including RLL segmental PE, additional acute SMV and PV thrombus with evidence of cavernous transfomration, acute R gonadal vein thrombus, no evidence of metastatic disease \par - continue on lovenox \par \par Discussed above with attending, Dr. Jeffries. \par \par Amadeo Fairbanks MD \par Hematology/Oncology Fellow, PGY-5 \par

## 2021-10-28 NOTE — REVIEW OF SYSTEMS
[Fatigue] : fatigue [Constipation] : constipation [Joint Pain] : joint pain [Negative] : Allergic/Immunologic [Recent Change In Weight] : ~T no recent weight change [Lower Ext Edema] : no lower extremity edema [SOB on Exertion] : no shortness of breath during exertion [Diarrhea] : no diarrhea [de-identified] : neuropathy

## 2021-10-28 NOTE — RESULTS/DATA
[FreeTextEntry1] : \par EXAM: CT ABDOMEN AND PELVIS OC IC\par \par EXAM: CT CHEST IC\par \par \par PROCEDURE DATE: 09/29/2021\par \par \par \par INTERPRETATION: CLINICAL INFORMATION: Endometrial cancer status post chemotherapy. Restaging.\par \par COMPARISON: CT 5/15/2021\par \par CONTRAST/COMPLICATIONS:\par IV Contrast: Omnipaque 350 90 cc administered 10 cc discarded\par Oral Contrast: Omnipaque 300\par Complications: None reported at time of study completion\par \par PROCEDURE:\par CT of the Chest, Abdomen and Pelvis was performed.\par Sagittal and coronal reformats were performed.\par \par FINDINGS:\par CHEST:\par LUNGS AND LARGE AIRWAYS: Patent central airways. No pulmonary nodules.\par PLEURA: No pleural effusion.\par VESSELS: Acute right lower lobe segmental pulmonary embolus. Atherosclerotic changes of the aorta and coronary arteries.\par HEART: Heart size is normal. No pericardial effusion.\par MEDIASTINUM AND GETACHEW: No lymphadenopathy.\par CHEST WALL AND LOWER NECK: Right chest wall Mediport with catheter tip in the SVC. A 6.2 cm exophytic heterogeneous left lower pole thyroid nodule.\par \par ABDOMEN AND PELVIS:\par LIVER: Within normal limits.\par BILE DUCTS: Normal caliber.\par GALLBLADDER: Cholelithiasis.\par SPLEEN: Within normal limits.\par PANCREAS: Within normal limits.\par ADRENALS: Within normal limits.\par KIDNEYS/URETERS: Within normal limits.\par \par BLADDER: Within normal limits.\par REPRODUCTIVE ORGANS: Hysterectomy.\par \par BOWEL: Right hemicolectomy. No bowel obstruction.\par PERITONEUM: Trace ascites.\par VESSELS: Acute thrombus within the SMV and main portal vein, new from prior imaging. Evidence of cavernous transformation. 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 JIMENA SOLOMON 10/4/2021 10:28 AM by Dr. Rain with read back confirmation.\par \par --- End of Report ---\par \par \par \par \par \par \par TEJAS RAIN MD; Attending Radiologist\par This document has been electronically signed. Oct 4 2021 10:34AM\par

## 2021-10-28 NOTE — PHYSICAL EXAM
[Restricted in physically strenuous activity but ambulatory and able to carry out work of a light or sedentary nature] : Status 1- Restricted in physically strenuous activity but ambulatory and able to carry out work of a light or sedentary nature, e.g., light house work, office work [Normal] : affect appropriate [de-identified] : surgical incisions c/d/i

## 2021-11-03 ENCOUNTER — NON-APPOINTMENT (OUTPATIENT)
Age: 81
End: 2021-11-03

## 2021-11-04 ENCOUNTER — NON-APPOINTMENT (OUTPATIENT)
Age: 81
End: 2021-11-04

## 2021-11-04 PROCEDURE — 77333 RADIATION TREATMENT AID(S): CPT | Mod: 26

## 2021-11-17 PROCEDURE — 77338 DESIGN MLC DEVICE FOR IMRT: CPT | Mod: 26

## 2021-11-17 PROCEDURE — 77300 RADIATION THERAPY DOSE PLAN: CPT | Mod: 26

## 2021-11-17 PROCEDURE — 77301 RADIOTHERAPY DOSE PLAN IMRT: CPT | Mod: 26

## 2021-11-24 ENCOUNTER — NON-APPOINTMENT (OUTPATIENT)
Age: 81
End: 2021-11-24

## 2021-11-26 ENCOUNTER — OUTPATIENT (OUTPATIENT)
Dept: OUTPATIENT SERVICES | Facility: HOSPITAL | Age: 81
LOS: 1 days | Discharge: HOME | End: 2021-11-26

## 2021-11-26 DIAGNOSIS — C54.1 MALIGNANT NEOPLASM OF ENDOMETRIUM: ICD-10-CM

## 2021-11-26 DIAGNOSIS — Z98.51 TUBAL LIGATION STATUS: Chronic | ICD-10-CM

## 2021-11-26 DIAGNOSIS — Z98.890 OTHER SPECIFIED POSTPROCEDURAL STATES: Chronic | ICD-10-CM

## 2021-11-29 ENCOUNTER — APPOINTMENT (OUTPATIENT)
Dept: HEMATOLOGY ONCOLOGY | Facility: CLINIC | Age: 81
End: 2021-11-29

## 2021-11-29 ENCOUNTER — NON-APPOINTMENT (OUTPATIENT)
Age: 81
End: 2021-11-29

## 2021-11-30 PROCEDURE — 77387B: CUSTOM | Mod: 26

## 2021-12-01 ENCOUNTER — NON-APPOINTMENT (OUTPATIENT)
Age: 81
End: 2021-12-01

## 2021-12-01 PROCEDURE — 77387B: CUSTOM | Mod: 26

## 2021-12-02 PROCEDURE — 77387B: CUSTOM | Mod: 26

## 2021-12-02 NOTE — HISTORY OF PRESENT ILLNESS
[FreeTextEntry1] : 81yo F with prior stage I colon cancer s/p hemicolectomy now with stage IIIC1 clear cell endometrial carcinoma s/p RASHEEDA BSO with Dr Cox on May 20th 2021 followed by 6 cycles adjuvant carbo + taxol 9/30/21 with Dr Jeffries. Final path with 40% MMI,CSI+, LVI+, 1/1 obturator LN, 4/4 left common iliac LNs, no MARIANN. No PA ally sampling. [id number] Stage IIIC1\par \par 12/1/21  OTV  - hematoma improving, nonpainful.  Denies vaginal d/c, bleeding, changes in urination, bowel habits..

## 2021-12-02 NOTE — PHYSICAL EXAM
[Normal] : oriented to person, place and time, the affect was normal, the mood was normal and not anxious [FreeTextEntry1] : LLQ hematoma, decreased in size since last evaluation.\par \par Nonpainful to palpation

## 2021-12-02 NOTE — DISEASE MANAGEMENT
[Pathological] : TNM Stage: p [IIIC] : IIIC [FreeTextEntry4] : at least IIIC1 [TTNM] : 2 [NTNM] : 1a [MTNM] : x [de-identified] : 022 [de-identified] : 5897 [de-identified] : endometrium

## 2021-12-03 ENCOUNTER — NON-APPOINTMENT (OUTPATIENT)
Age: 81
End: 2021-12-03

## 2021-12-06 PROCEDURE — 77387B: CUSTOM | Mod: 26

## 2021-12-07 ENCOUNTER — NON-APPOINTMENT (OUTPATIENT)
Age: 81
End: 2021-12-07

## 2021-12-07 ENCOUNTER — LABORATORY RESULT (OUTPATIENT)
Age: 81
End: 2021-12-07

## 2021-12-07 VITALS
WEIGHT: 159.28 LBS | BODY MASS INDEX: 24.94 KG/M2 | SYSTOLIC BLOOD PRESSURE: 163 MMHG | TEMPERATURE: 97.88 F | OXYGEN SATURATION: 100 % | HEART RATE: 81 BPM | RESPIRATION RATE: 16 BRPM | DIASTOLIC BLOOD PRESSURE: 89 MMHG

## 2021-12-07 PROCEDURE — 77387B: CUSTOM | Mod: 26

## 2021-12-07 PROCEDURE — 77427 RADIATION TX MANAGEMENT X5: CPT

## 2021-12-07 NOTE — HISTORY OF PRESENT ILLNESS
[FreeTextEntry1] : 81yo F with prior stage I colon cancer s/p hemicolectomy now with stage IIIC1 clear cell endometrial carcinoma s/p RASHEEDA BSO with Dr Cox on May 20th 2021 followed by 6 cycles adjuvant carbo + taxol 9/30/21 with Dr Jeffries. Final path with 40% MMI,CSI+, LVI+, 1/1 obturator LN, 4/4 left common iliac LNs, no MARIANN. No PA ally sampling. [id number] Stage IIIC1\par \par 12/1/21  OTV  - hematoma improving, nonpainful.  Denies vaginal d/c, bleeding, changes in urination, bowel habits..\par \par 12/7/21- OTV fx 5/25. Patient states that last night it seems the hematoma drained. She woke up with some wetness on her purple nightgown. States there was a semi-foul odor and when it dried it was a whitish color. Denies issues with urinary, no vaginal discharge, and issues with her bowels (soft bowel movements 1-2 episodes a day). Having some fatigue which is relieved with rest.

## 2021-12-07 NOTE — PHYSICAL EXAM
[Normal] : oriented to person, place and time, the affect was normal, the mood was normal and not anxious [FreeTextEntry1] : Hematoma on LLQ improved

## 2021-12-07 NOTE — REVIEW OF SYSTEMS
[Constipation: Grade 0] : Constipation: Grade 0 [Diarrhea: Grade 0] : Diarrhea: Grade 0 [Fatigue: Grade 1 - Fatigue relieved by rest] : Fatigue: Grade 1 - Fatigue relieved by rest [Hematuria: Grade 0] : Hematuria: Grade 0 [Urinary Incontinence: Grade 0] : Urinary Incontinence: Grade 0  [Urinary Retention: Grade 0] : Urinary Retention: Grade 0 [Urinary Tract Pain: Grade 0] : Urinary Tract Pain: Grade 0 [Urinary Urgency: Grade 0] : Urinary Urgency: Grade 0 [Urinary Frequency: Grade 0] : Urinary Frequency: Grade 0 [FreeTextEntry3] : 1-2 soft bowel movements a day

## 2021-12-07 NOTE — DISEASE MANAGEMENT
[Pathological] : TNM Stage: p [IIIC] : IIIC [FreeTextEntry4] : at least IIIC1 [TTNM] : 2 [NTNM] : 1a [MTNM] : x [de-identified] : 9 [de-identified] : [id number] [de-identified] : Pelvis/ PA nodes

## 2021-12-08 PROCEDURE — 77387B: CUSTOM | Mod: 26

## 2021-12-09 LAB
BASOPHILS # BLD AUTO: 0.02 K/UL
BASOPHILS NFR BLD AUTO: 0.8 %
EOSINOPHIL # BLD AUTO: 0.02 K/UL
EOSINOPHIL NFR BLD AUTO: 0.8 %
HCT VFR BLD CALC: 38.8 %
HGB BLD-MCNC: 12 G/DL
IMM GRANULOCYTES NFR BLD AUTO: 0.4 %
LYMPHOCYTES # BLD AUTO: 0.65 K/UL
LYMPHOCYTES NFR BLD AUTO: 25 %
MAN DIFF?: NORMAL
MCHC RBC-ENTMCNC: 30.1 PG
MCHC RBC-ENTMCNC: 30.9 GM/DL
MCV RBC AUTO: 97.2 FL
MONOCYTES # BLD AUTO: 0.24 K/UL
MONOCYTES NFR BLD AUTO: 9.2 %
NEUTROPHILS # BLD AUTO: 1.66 K/UL
NEUTROPHILS NFR BLD AUTO: 63.8 %
PLATELET # BLD AUTO: 162 K/UL
RBC # BLD: 3.99 M/UL
RBC # FLD: 13.5 %
WBC # FLD AUTO: 2.6 K/UL

## 2021-12-09 PROCEDURE — 77387B: CUSTOM | Mod: 26

## 2021-12-10 DIAGNOSIS — Z92.21 PERSONAL HISTORY OF ANTINEOPLASTIC CHEMOTHERAPY: ICD-10-CM

## 2021-12-10 PROCEDURE — 77387B: CUSTOM | Mod: 26

## 2021-12-12 LAB
BASOPHILS # BLD AUTO: 0.01 K/UL
BASOPHILS NFR BLD AUTO: 0.5 %
EOSINOPHIL # BLD AUTO: 0.04 K/UL
EOSINOPHIL NFR BLD AUTO: 1.9 %
HCT VFR BLD CALC: 39.8 %
HGB BLD-MCNC: 12.5 G/DL
IMM GRANULOCYTES NFR BLD AUTO: 0.5 %
LYMPHOCYTES # BLD AUTO: 0.53 K/UL
LYMPHOCYTES NFR BLD AUTO: 24.7 %
MAN DIFF?: NORMAL
MCHC RBC-ENTMCNC: 30.9 PG
MCHC RBC-ENTMCNC: 31.4 GM/DL
MCV RBC AUTO: 98.5 FL
MONOCYTES # BLD AUTO: 0.26 K/UL
MONOCYTES NFR BLD AUTO: 12.1 %
NEUTROPHILS # BLD AUTO: 1.3 K/UL
NEUTROPHILS NFR BLD AUTO: 60.3 %
PLATELET # BLD AUTO: 140 K/UL
RBC # BLD: 4.04 M/UL
RBC # FLD: 13.7 %
WBC # FLD AUTO: 2.15 K/UL

## 2021-12-13 ENCOUNTER — APPOINTMENT (OUTPATIENT)
Dept: GYNECOLOGIC ONCOLOGY | Facility: CLINIC | Age: 81
End: 2021-12-13

## 2021-12-13 ENCOUNTER — NON-APPOINTMENT (OUTPATIENT)
Age: 81
End: 2021-12-13

## 2021-12-13 PROCEDURE — 77387B: CUSTOM | Mod: 26

## 2021-12-14 ENCOUNTER — NON-APPOINTMENT (OUTPATIENT)
Age: 81
End: 2021-12-14

## 2021-12-14 ENCOUNTER — LABORATORY RESULT (OUTPATIENT)
Age: 81
End: 2021-12-14

## 2021-12-14 PROCEDURE — 77387B: CUSTOM | Mod: 26

## 2021-12-14 PROCEDURE — 77427 RADIATION TX MANAGEMENT X5: CPT

## 2021-12-15 PROCEDURE — 77387B: CUSTOM | Mod: 26

## 2021-12-16 LAB
ALBUMIN SERPL ELPH-MCNC: 4.2 G/DL
ALP BLD-CCNC: 84 U/L
ALT SERPL-CCNC: 15 U/L
ANION GAP SERPL CALC-SCNC: 13 MMOL/L
AST SERPL-CCNC: 18 U/L
BILIRUB SERPL-MCNC: 0.3 MG/DL
BUN SERPL-MCNC: 8 MG/DL
CALCIUM SERPL-MCNC: 10.8 MG/DL
CHLORIDE SERPL-SCNC: 104 MMOL/L
CO2 SERPL-SCNC: 25 MMOL/L
CREAT SERPL-MCNC: 0.9 MG/DL
GLUCOSE SERPL-MCNC: 88 MG/DL
POTASSIUM SERPL-SCNC: 4.8 MMOL/L
PROT SERPL-MCNC: 7.4 G/DL
SODIUM SERPL-SCNC: 142 MMOL/L

## 2021-12-16 PROCEDURE — 77387B: CUSTOM | Mod: 26

## 2021-12-17 PROCEDURE — 77387B: CUSTOM | Mod: 26

## 2021-12-20 LAB
BASOPHILS # BLD AUTO: 0 K/UL
BASOPHILS NFR BLD AUTO: 0 %
EOSINOPHIL # BLD AUTO: 0.05 K/UL
EOSINOPHIL NFR BLD AUTO: 2.7 %
HCT VFR BLD CALC: 38.9 %
HGB BLD-MCNC: 11.9 G/DL
IMM GRANULOCYTES NFR BLD AUTO: 0.5 %
LYMPHOCYTES # BLD AUTO: 0.48 K/UL
LYMPHOCYTES NFR BLD AUTO: 25.7 %
MAN DIFF?: NORMAL
MCHC RBC-ENTMCNC: 30.1 PG
MCHC RBC-ENTMCNC: 30.6 GM/DL
MCV RBC AUTO: 98.5 FL
MONOCYTES # BLD AUTO: 0.28 K/UL
MONOCYTES NFR BLD AUTO: 15 %
NEUTROPHILS # BLD AUTO: 1.05 K/UL
NEUTROPHILS NFR BLD AUTO: 56.1 %
PLATELET # BLD AUTO: 123 K/UL
RBC # BLD: 3.95 M/UL
RBC # FLD: 13.5 %
WBC # FLD AUTO: 1.87 K/UL

## 2021-12-20 PROCEDURE — 77387B: CUSTOM | Mod: 26

## 2021-12-20 PROCEDURE — 77427 RADIATION TX MANAGEMENT X5: CPT

## 2021-12-20 NOTE — HISTORY OF PRESENT ILLNESS
[FreeTextEntry1] : 79yo F with prior stage I colon cancer s/p hemicolectomy now with stage IIIC1 clear cell endometrial carcinoma s/p RASHEEDA BSO with Dr Cox on May 20th 2021 followed by 6 cycles adjuvant carbo + taxol 9/30/21 with Dr Jeffries. Final path with 40% MMI,CSI+, LVI+, 1/1 obturator LN, 4/4 left common iliac LNs, no MARIANN. No PA ally sampling. [id number] Stage IIIC1\par \par 12/1/21  OTV  - hematoma improving, nonpainful.  Denies vaginal d/c, bleeding, changes in urination, bowel habits..\par \par 12/7/21- OTV fx 5/25. Patient states that last night it seems the hematoma drained. She woke up with some wetness on her purple nightgown. States there was a semi-foul odor and when it dried it was a whitish color. Denies issues with urinary, no vaginal discharge, and issues with her bowels (soft bowel movements 1-2 episodes a day). Having some fatigue which is relieved with rest. \par \par 12/14/21- OTV fx 10/25. Patient with nonbloody diarrhea yesterday, resolved with "pink pill".  Denies issues with urinary, no vaginal discharge/ bleeding. Having some fatigue which is relieved with rest.

## 2021-12-20 NOTE — DISEASE MANAGEMENT
[Pathological] : TNM Stage: p [IIIC] : IIIC [FreeTextEntry4] : at least IIIC1 [TTNM] : 2 [NTNM] : 1a [MTNM] : x [de-identified] : [id number] [de-identified] : [id number] [de-identified] : Pelvis/ PA nodes

## 2021-12-21 ENCOUNTER — APPOINTMENT (OUTPATIENT)
Dept: HEMATOLOGY ONCOLOGY | Facility: CLINIC | Age: 81
End: 2021-12-21

## 2021-12-21 ENCOUNTER — NON-APPOINTMENT (OUTPATIENT)
Age: 81
End: 2021-12-21

## 2021-12-22 ENCOUNTER — RESULT REVIEW (OUTPATIENT)
Age: 81
End: 2021-12-22

## 2021-12-22 ENCOUNTER — APPOINTMENT (OUTPATIENT)
Dept: HEMATOLOGY ONCOLOGY | Facility: CLINIC | Age: 81
End: 2021-12-22
Payer: MEDICARE

## 2021-12-22 ENCOUNTER — APPOINTMENT (OUTPATIENT)
Dept: HEMATOLOGY ONCOLOGY | Facility: CLINIC | Age: 81
End: 2021-12-22

## 2021-12-22 VITALS
HEIGHT: 67.01 IN | SYSTOLIC BLOOD PRESSURE: 174 MMHG | BODY MASS INDEX: 24.57 KG/M2 | RESPIRATION RATE: 16 BRPM | WEIGHT: 156.53 LBS | TEMPERATURE: 97.3 F | HEART RATE: 57 BPM | DIASTOLIC BLOOD PRESSURE: 77 MMHG | OXYGEN SATURATION: 100 %

## 2021-12-22 LAB
BASOPHILS # BLD AUTO: 0 K/UL — SIGNIFICANT CHANGE UP (ref 0–0.2)
BASOPHILS NFR BLD AUTO: 0 % — SIGNIFICANT CHANGE UP (ref 0–2)
EOSINOPHIL # BLD AUTO: 0 K/UL — SIGNIFICANT CHANGE UP (ref 0–0.5)
EOSINOPHIL NFR BLD AUTO: 0 % — SIGNIFICANT CHANGE UP (ref 0–6)
HCT VFR BLD CALC: 34.5 % — SIGNIFICANT CHANGE UP (ref 34.5–45)
HGB BLD-MCNC: 11 G/DL — LOW (ref 11.5–15.5)
LYMPHOCYTES # BLD AUTO: 0.28 K/UL — LOW (ref 1–3.3)
LYMPHOCYTES # BLD AUTO: 15 % — SIGNIFICANT CHANGE UP (ref 13–44)
MCHC RBC-ENTMCNC: 30.2 PG — SIGNIFICANT CHANGE UP (ref 27–34)
MCHC RBC-ENTMCNC: 31.9 G/DL — LOW (ref 32–36)
MCV RBC AUTO: 94.8 FL — SIGNIFICANT CHANGE UP (ref 80–100)
MONOCYTES # BLD AUTO: 0.24 K/UL — SIGNIFICANT CHANGE UP (ref 0–0.9)
MONOCYTES NFR BLD AUTO: 13 % — SIGNIFICANT CHANGE UP (ref 2–14)
NEUTROPHILS # BLD AUTO: 1.35 K/UL — LOW (ref 1.8–7.4)
NEUTROPHILS NFR BLD AUTO: 72 % — SIGNIFICANT CHANGE UP (ref 43–77)
NRBC # BLD: 0 /100 — SIGNIFICANT CHANGE UP (ref 0–0)
NRBC # BLD: SIGNIFICANT CHANGE UP /100 WBCS (ref 0–0)
PLAT MORPH BLD: NORMAL — SIGNIFICANT CHANGE UP
PLATELET # BLD AUTO: 114 K/UL — LOW (ref 150–400)
RBC # BLD: 3.64 M/UL — LOW (ref 3.8–5.2)
RBC # FLD: 13.2 % — SIGNIFICANT CHANGE UP (ref 10.3–14.5)
RBC BLD AUTO: SIGNIFICANT CHANGE UP
WBC # BLD: 1.88 K/UL — LOW (ref 3.8–10.5)
WBC # FLD AUTO: 1.88 K/UL — LOW (ref 3.8–10.5)

## 2021-12-22 PROCEDURE — 99214 OFFICE O/P EST MOD 30 MIN: CPT

## 2021-12-23 LAB
ALBUMIN SERPL ELPH-MCNC: 4.1 G/DL
ALP BLD-CCNC: 74 U/L
ALT SERPL-CCNC: 6 U/L
ANION GAP SERPL CALC-SCNC: 17 MMOL/L
AST SERPL-CCNC: 16 U/L
BILIRUB SERPL-MCNC: 0.2 MG/DL
BUN SERPL-MCNC: 6 MG/DL
CALCIUM SERPL-MCNC: 10.4 MG/DL
CANCER AG125 SERPL-ACNC: 5 U/ML
CHLORIDE SERPL-SCNC: 102 MMOL/L
CO2 SERPL-SCNC: 21 MMOL/L
CREAT SERPL-MCNC: 0.85 MG/DL
GLUCOSE SERPL-MCNC: 121 MG/DL
MAGNESIUM SERPL-MCNC: 1.8 MG/DL
POTASSIUM SERPL-SCNC: 3.9 MMOL/L
PROT SERPL-MCNC: 6.8 G/DL
SODIUM SERPL-SCNC: 140 MMOL/L

## 2021-12-27 PROCEDURE — 77387B: CUSTOM | Mod: 26

## 2021-12-27 NOTE — DISEASE MANAGEMENT
[Pathological] : TNM Stage: p [IIIC] : IIIC [FreeTextEntry4] : at least IIIC1 [TTNM] : 2 [NTNM] : 1a [MTNM] : x [de-identified] : [id number] [[place] [id number]] : [id number] [de-identified] : Pelvis/ PA nodes

## 2021-12-27 NOTE — HISTORY OF PRESENT ILLNESS
[FreeTextEntry1] : 79yo F with prior stage I colon cancer s/p hemicolectomy now with stage IIIC1 clear cell endometrial carcinoma s/p RASHEEDA BSO with Dr Cox on May 20th 2021 followed by 6 cycles adjuvant carbo + taxol 9/30/21 with Dr Jeffries. Final path with 40% MMI,CSI+, LVI+, 1/1 obturator LN, 4/4 left common iliac LNs, no MARIANN. No PA ally sampling. [id number] Stage IIIC1\par \par 12/1/21  OTV  - hematoma improving, nonpainful.  Denies vaginal d/c, bleeding, changes in urination, bowel habits..\par \par 12/7/21- OTV fx 5/25. Patient states that last night it seems the hematoma drained. She woke up with some wetness on her purple nightgown. States there was a semi-foul odor and when it dried it was a whitish color. Denies issues with urinary, no vaginal discharge, and issues with her bowels (soft bowel movements 1-2 episodes a day). Having some fatigue which is relieved with rest. \par \par 12/14/21- OTV fx 10/25. Patient with nonbloody diarrhea yesterday, resolved with "pink pill".  Denies issues with urinary, no vaginal discharge/ bleeding. Having some fatigue which is relieved with rest. \par \par 12/21/21 - OTV fx 15/25. on break since 12/15. Await repeat cbc

## 2021-12-28 ENCOUNTER — NON-APPOINTMENT (OUTPATIENT)
Age: 81
End: 2021-12-28

## 2021-12-28 PROCEDURE — 77387B: CUSTOM | Mod: 26

## 2021-12-28 NOTE — REVIEW OF SYSTEMS
[Constipation: Grade 0] : Constipation: Grade 0 [Nausea: Grade 0] : Nausea: Grade 0 [Fatigue: Grade 1 - Fatigue relieved by rest] : Fatigue: Grade 1 - Fatigue relieved by rest [Hematuria: Grade 0] : Hematuria: Grade 0 [Urinary Incontinence: Grade 0] : Urinary Incontinence: Grade 0  [Urinary Retention: Grade 0] : Urinary Retention: Grade 0 [Urinary Tract Pain: Grade 0] : Urinary Tract Pain: Grade 0 [Urinary Urgency: Grade 0] : Urinary Urgency: Grade 0 [Urinary Frequency: Grade 0] : Urinary Frequency: Grade 0

## 2021-12-29 PROCEDURE — 77387B: CUSTOM | Mod: 26

## 2021-12-30 LAB
ALBUMIN SERPL ELPH-MCNC: 4 G/DL
ALP BLD-CCNC: 71 U/L
ALT SERPL-CCNC: 10 U/L
ANION GAP SERPL CALC-SCNC: 8 MMOL/L
AST SERPL-CCNC: 18 U/L
BASOPHILS # BLD AUTO: 0.01 K/UL
BASOPHILS NFR BLD AUTO: 0.5 %
BILIRUB SERPL-MCNC: 0.3 MG/DL
BUN SERPL-MCNC: 4 MG/DL
CALCIUM SERPL-MCNC: 10.2 MG/DL
CHLORIDE SERPL-SCNC: 103 MMOL/L
CO2 SERPL-SCNC: 28 MMOL/L
CREAT SERPL-MCNC: 0.69 MG/DL
EOSINOPHIL # BLD AUTO: 0.04 K/UL
EOSINOPHIL NFR BLD AUTO: 1.9 %
GLUCOSE SERPL-MCNC: 100 MG/DL
HCT VFR BLD CALC: 36.5 %
HGB BLD-MCNC: 11.5 G/DL
IMM GRANULOCYTES NFR BLD AUTO: 1 %
LYMPHOCYTES # BLD AUTO: 0.28 K/UL
LYMPHOCYTES NFR BLD AUTO: 13.5 %
MAN DIFF?: NORMAL
MCHC RBC-ENTMCNC: 30.3 PG
MCHC RBC-ENTMCNC: 31.5 GM/DL
MCV RBC AUTO: 96.1 FL
MONOCYTES # BLD AUTO: 0.4 K/UL
MONOCYTES NFR BLD AUTO: 19.2 %
NEUTROPHILS # BLD AUTO: 1.33 K/UL
NEUTROPHILS NFR BLD AUTO: 63.9 %
PLATELET # BLD AUTO: 141 K/UL
POTASSIUM SERPL-SCNC: 3.8 MMOL/L
PROT SERPL-MCNC: 7.1 G/DL
RBC # BLD: 3.8 M/UL
RBC # FLD: 13.3 %
SODIUM SERPL-SCNC: 139 MMOL/L
WBC # FLD AUTO: 2.08 K/UL

## 2021-12-30 PROCEDURE — 77387B: CUSTOM | Mod: 26

## 2021-12-30 NOTE — HISTORY OF PRESENT ILLNESS
[FreeTextEntry1] : 81 year old female with prior Stage I colon cancer s/p hemicolectomy now with Stage IIIC1 Clear Cell Endometrial Carcinoma. She is s/p RASHEEDA BSO (5/20/21), followed by 6 cycles adjuvant carbo/taxol. Final path with 40% MMI, CSI+, LVI+, 1/1 obturator LN, 4/4 left common iliac LNs, no MARIANN. No PA ally sampling. \par \par Clincal Course:\par \par 12/1/21  OTV  - hematoma improving, nonpainful.  Denies vaginal d/c, bleeding, changes in urination, bowel habits..\par \par 12/7/21- OTV fx 5/25. Patient states that last night it seems the hematoma drained. She woke up with some wetness on her purple nightgown. States there was a semi-foul odor and when it dried it was a whitish color. Denies issues with urinary, no vaginal discharge, and issues with her bowels (soft bowel movements 1-2 episodes a day). Having some fatigue which is relieved with rest. \par \par 12/14/21- OTV fx 10/25. Patient with nonbloody diarrhea yesterday, resolved with "pink pill".  Denies issues with urinary, no vaginal discharge/ bleeding. Having some fatigue which is relieved with rest. \par \par 12/21/21 - OTV fx 15/25. on break since 12/15. Await repeat cbc\par \par 12/28/21 - OTV fx 16/25. Patient reports mild-moderate fatigue, relieved with rest. Patient reports no pain, endorses numbness to the left foot, occasional numbness to the right foot. States she was given a capsule to take at bedtime by Dr. Jeffries (gabapentin) which she says has been helping her. She denies diarrhea this week and last week, states her bowel movements have been softer than normal but not diarrhea. She denies any vaginal discharge/bleeding or urinary urgency/frequency.

## 2021-12-30 NOTE — DISEASE MANAGEMENT
[Pathological] : TNM Stage: p [IIIC] : IIIC [TTNM] : 2 [NTNM] : 1a [MTNM] : x [de-identified] : [id number] [[place] [id number]] : [id number] [de-identified] : Pelvis/PA Nodes

## 2022-01-03 PROCEDURE — 77387B: CUSTOM | Mod: 26

## 2022-01-03 PROCEDURE — 77427 RADIATION TX MANAGEMENT X5: CPT

## 2022-01-04 ENCOUNTER — NON-APPOINTMENT (OUTPATIENT)
Age: 82
End: 2022-01-04

## 2022-01-04 VITALS
DIASTOLIC BLOOD PRESSURE: 81 MMHG | WEIGHT: 159.48 LBS | RESPIRATION RATE: 16 BRPM | HEART RATE: 57 BPM | TEMPERATURE: 96.8 F | SYSTOLIC BLOOD PRESSURE: 171 MMHG | BODY MASS INDEX: 24.98 KG/M2 | OXYGEN SATURATION: 100 %

## 2022-01-04 PROCEDURE — 77387B: CUSTOM | Mod: 26

## 2022-01-04 NOTE — DISEASE MANAGEMENT
[Pathological] : TNM Stage: p [IIIC] : IIIC [TTNM] : 2 [NTNM] : 1a [MTNM] : x [de-identified] : [id number] [[place] [id number]] : [id number] [de-identified] : Pelvis/ PA Nodes

## 2022-01-04 NOTE — REVIEW OF SYSTEMS
[Constipation: Grade 0] : Constipation: Grade 0 [Diarrhea: Grade 0] : Diarrhea: Grade 0 [Nausea: Grade 0] : Nausea: Grade 0 [Fatigue: Grade 0] : Fatigue: Grade 0 [Hematuria: Grade 0] : Hematuria: Grade 0 [Urinary Incontinence: Grade 0] : Urinary Incontinence: Grade 0  [Urinary Retention: Grade 0] : Urinary Retention: Grade 0 [Urinary Tract Pain: Grade 0] : Urinary Tract Pain: Grade 0 [Urinary Urgency: Grade 0] : Urinary Urgency: Grade 0 [Urinary Frequency: Grade 0] : Urinary Frequency: Grade 0

## 2022-01-04 NOTE — HISTORY OF PRESENT ILLNESS
[FreeTextEntry1] : 81 year old female with prior Stage I colon cancer s/p hemicolectomy now with Stage IIIC1 Clear Cell Endometrial Carcinoma. She is s/p RASHEEDA BSO (5/20/21), followed by 6 cycles adjuvant carbo/taxol. Final path with 40% MMI, CSI+, LVI+, 1/1 obturator LN, 4/4 left common iliac LNs, no MARIANN. No PA ally sampling. \par \par Clincal Course:\par \par 12/1/21  OTV  - hematoma improving, nonpainful.  Denies vaginal d/c, bleeding, changes in urination, bowel habits..\par \par 12/7/21- OTV fx 5/25. Patient states that last night it seems the hematoma drained. She woke up with some wetness on her purple nightgown. States there was a semi-foul odor and when it dried it was a whitish color. Denies issues with urinary, no vaginal discharge, and issues with her bowels (soft bowel movements 1-2 episodes a day). Having some fatigue which is relieved with rest. \par \par 12/14/21- OTV fx 10/25. Patient with nonbloody diarrhea yesterday, resolved with "pink pill".  Denies issues with urinary, no vaginal discharge/ bleeding. Having some fatigue which is relieved with rest. \par \par 12/21/21 - OTV fx 15/25. on break since 12/15. Await repeat cbc\par \par 12/28/21 - OTV fx 16/25. Patient reports mild-moderate fatigue, relieved with rest. Patient reports no pain, endorses numbness to the left foot, occasional numbness to the right foot. States she was given a capsule to take at bedtime by Dr. Jeffries (gabapentin) which she says has been helping her. She denies diarrhea this week and last week, states her bowel movements have been softer than normal but not diarrhea. She denies any vaginal discharge/bleeding or urinary urgency/frequency. \par \par 1/4/21 - OTV fx 20/25. Patient endorses mild-moderate fatigue, relieved with rest. She denies vaginal bleeding/discharge and changes in urination. She denies any nausea/vomiting/diarrhea or constipation. She denies pain at this time. \par cbc from last week essentailly stable. Discussed vbt again and sized.

## 2022-01-05 PROCEDURE — 77387B: CUSTOM | Mod: 26

## 2022-01-06 PROCEDURE — 77290 THER RAD SIMULAJ FIELD CPLX: CPT | Mod: 26

## 2022-01-06 PROCEDURE — 77387B: CUSTOM | Mod: 26

## 2022-01-07 ENCOUNTER — OUTPATIENT (OUTPATIENT)
Dept: OUTPATIENT SERVICES | Facility: HOSPITAL | Age: 82
LOS: 1 days | Discharge: HOME | End: 2022-01-07

## 2022-01-07 DIAGNOSIS — Z98.890 OTHER SPECIFIED POSTPROCEDURAL STATES: Chronic | ICD-10-CM

## 2022-01-07 DIAGNOSIS — Z98.51 TUBAL LIGATION STATUS: Chronic | ICD-10-CM

## 2022-01-07 DIAGNOSIS — C54.1 MALIGNANT NEOPLASM OF ENDOMETRIUM: ICD-10-CM

## 2022-01-10 ENCOUNTER — APPOINTMENT (OUTPATIENT)
Dept: HEMATOLOGY ONCOLOGY | Facility: CLINIC | Age: 82
End: 2022-01-10
Payer: MEDICARE

## 2022-01-10 VITALS
RESPIRATION RATE: 16 BRPM | BODY MASS INDEX: 24.74 KG/M2 | OXYGEN SATURATION: 100 % | WEIGHT: 157.63 LBS | HEART RATE: 54 BPM | SYSTOLIC BLOOD PRESSURE: 171 MMHG | DIASTOLIC BLOOD PRESSURE: 72 MMHG | HEIGHT: 67.01 IN | TEMPERATURE: 96.9 F

## 2022-01-10 DIAGNOSIS — L29.9 PRURITUS, UNSPECIFIED: ICD-10-CM

## 2022-01-10 PROCEDURE — 99215 OFFICE O/P EST HI 40 MIN: CPT

## 2022-01-10 PROCEDURE — 77387B: CUSTOM | Mod: 26

## 2022-01-11 ENCOUNTER — APPOINTMENT (OUTPATIENT)
Dept: INFUSION THERAPY | Facility: HOSPITAL | Age: 82
End: 2022-01-11

## 2022-01-11 ENCOUNTER — RESULT REVIEW (OUTPATIENT)
Age: 82
End: 2022-01-11

## 2022-01-11 LAB
ALBUMIN SERPL ELPH-MCNC: 4.1 G/DL — SIGNIFICANT CHANGE UP (ref 3.3–5)
ALP SERPL-CCNC: 84 U/L — SIGNIFICANT CHANGE UP (ref 40–120)
ALT FLD-CCNC: 13 U/L — SIGNIFICANT CHANGE UP (ref 10–45)
ANION GAP SERPL CALC-SCNC: 12 MMOL/L — SIGNIFICANT CHANGE UP (ref 5–17)
AST SERPL-CCNC: 17 U/L — SIGNIFICANT CHANGE UP (ref 10–40)
BILIRUB SERPL-MCNC: 0.4 MG/DL — SIGNIFICANT CHANGE UP (ref 0.2–1.2)
BUN SERPL-MCNC: 5 MG/DL — LOW (ref 7–23)
CALCIUM SERPL-MCNC: 10.4 MG/DL — SIGNIFICANT CHANGE UP (ref 8.4–10.5)
CHLORIDE SERPL-SCNC: 103 MMOL/L — SIGNIFICANT CHANGE UP (ref 96–108)
CO2 SERPL-SCNC: 23 MMOL/L — SIGNIFICANT CHANGE UP (ref 22–31)
CREAT SERPL-MCNC: 0.81 MG/DL — SIGNIFICANT CHANGE UP (ref 0.5–1.3)
GLUCOSE SERPL-MCNC: 147 MG/DL — HIGH (ref 70–99)
HCT VFR BLD CALC: 35.4 % — SIGNIFICANT CHANGE UP (ref 34.5–45)
HGB BLD-MCNC: 11.4 G/DL — LOW (ref 11.5–15.5)
MAGNESIUM SERPL-MCNC: 1.9 MG/DL — SIGNIFICANT CHANGE UP (ref 1.6–2.6)
MCHC RBC-ENTMCNC: 30.3 PG — SIGNIFICANT CHANGE UP (ref 27–34)
MCHC RBC-ENTMCNC: 32.2 G/DL — SIGNIFICANT CHANGE UP (ref 32–36)
MCV RBC AUTO: 94.1 FL — SIGNIFICANT CHANGE UP (ref 80–100)
PLATELET # BLD AUTO: 132 K/UL — LOW (ref 150–400)
POTASSIUM SERPL-MCNC: 4.1 MMOL/L — SIGNIFICANT CHANGE UP (ref 3.5–5.3)
POTASSIUM SERPL-SCNC: 4.1 MMOL/L — SIGNIFICANT CHANGE UP (ref 3.5–5.3)
PROT SERPL-MCNC: 6.9 G/DL — SIGNIFICANT CHANGE UP (ref 6–8.3)
RBC # BLD: 3.76 M/UL — LOW (ref 3.8–5.2)
RBC # FLD: 13.5 % — SIGNIFICANT CHANGE UP (ref 10.3–14.5)
SODIUM SERPL-SCNC: 138 MMOL/L — SIGNIFICANT CHANGE UP (ref 135–145)
WBC # BLD: 2.63 K/UL — LOW (ref 3.8–10.5)
WBC # FLD AUTO: 2.63 K/UL — LOW (ref 3.8–10.5)

## 2022-01-11 PROCEDURE — 77427 RADIATION TX MANAGEMENT X5: CPT

## 2022-01-11 PROCEDURE — 77387B: CUSTOM | Mod: 26

## 2022-01-11 PROCEDURE — 77317 BRACHYTX ISODOSE INTERMED: CPT | Mod: 26

## 2022-01-12 PROCEDURE — 77387B: CUSTOM | Mod: 26

## 2022-01-14 PROCEDURE — 57156 INS VAG BRACHYTX DEVICE: CPT

## 2022-01-14 PROCEDURE — 77770 HDR RDNCL NTRSTL/ICAV BRCHTX: CPT | Mod: 26

## 2022-01-14 PROCEDURE — 77332 RADIATION TREATMENT AID(S): CPT | Mod: 26

## 2022-01-21 PROCEDURE — 77770 HDR RDNCL NTRSTL/ICAV BRCHTX: CPT | Mod: 26

## 2022-01-21 PROCEDURE — 77332 RADIATION TREATMENT AID(S): CPT | Mod: 26

## 2022-01-21 PROCEDURE — 57156 INS VAG BRACHYTX DEVICE: CPT

## 2022-01-26 ENCOUNTER — APPOINTMENT (OUTPATIENT)
Dept: HEMATOLOGY ONCOLOGY | Facility: CLINIC | Age: 82
End: 2022-01-26

## 2022-02-07 NOTE — ASSESSMENT
[Curative] : Goals of care discussed with patient: Curative [Palliative Care Plan] : not applicable at this time [FreeTextEntry1] : Ms. Vasquez is here for follow up of her Stage IIIC high grade clear cell endometrial cancer, s/p RASHEEDA/BSO on 5/20/21 with hemcolectomy and stage 1 colon cancer found, s/p adjuvent carbo/taxol 6 cycles completed on 10/25/21 with CT on 9/29/21 showing no evidence of disease but multiple clots including RLL segmental PE, additional acute SMV and PV thrombus with evidence of cavernous transfomration, acute R gonadal vein thrombus but no e/o metastatic disease. Her  normalized. Plan for radiation EBRT 50.4gy/28fx extended field due to common iliac node +, and plan to follow with VBT 12gy/2 due to LSI. Plan to begin 3-4 weeks after last chemo to allow for washout. Started RT on 12/1/21 without any complications, tolerating well. \par \par 1. Stage IIIC High Grade Clear Cell Endometrial Cancer \par - s/p RASHEEDA/BSO on 5/20/21 with hemicolectomy and stage 1 colon cancer found, s/p adjuvant carbo/taxol 6 cycles completed on 10/25/21\par - blood work today including  \par - Overall plan for EBRT 50.4gy/28fx extended field due to common iliac node + followed by VBT 12gy/2 due to LSI \par - Today RT on 15/25 fx tolerating well. Cont f/u with Dr. Shirley \par - continue gabapentin for neuropathy of LLE\par - continue f/u with Dr Cox in gyn onc clinic \par - RTC after RT \par \par 2. DVT\par - CT on 9/29/21 showing no evidence of disease but multiple clots including RLL segmental PE, additional acute SMV and PV thrombus with evidence of cavernous transfomration, acute R gonadal vein thrombus, no evidence of metastatic disease \par - continue on lovenox, advised to rotate site of injection which is likely what the cause of nontender LLQ subcutaeous nodule \par \par Discussed above with attending, Dr. Jeffries. \par \par Amadeo Fairbanks MD \par Hematology/Oncology Fellow, PGY-5 \par

## 2022-02-07 NOTE — REVIEW OF SYSTEMS
[Fatigue] : fatigue [Constipation] : constipation [Joint Pain] : joint pain [Negative] : Allergic/Immunologic [Recent Change In Weight] : ~T no recent weight change [Lower Ext Edema] : no lower extremity edema [SOB on Exertion] : no shortness of breath during exertion [Diarrhea] : no diarrhea [de-identified] : neuropathy

## 2022-02-07 NOTE — HISTORY OF PRESENT ILLNESS
[Disease: _____________________] : Disease: [unfilled] [AJCC Stage: ____] : AJCC Stage: [unfilled] [de-identified] : Referred by Dr. Cox \par \par Ms. 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 In addition, the patient is s/p micki-colectomy by Dr. Reaves and found to have stage I colon cancer.\par \par The patient reports that she felt well until January 25th 2021 when she had an episode of vaginal bleeding. She has never had post-menopausal bleeding prior to this episode.\par \par \par  [de-identified] : stage I colon cancer. [FreeTextEntry1] : surgery on 5/20/21 --> adjuvant Carbo/Taxol June 28, 2021 s/p cycle 6/6 on 10/11/2021--> EBRT 25 tx started on 12/1/21 [de-identified] : Ms. Cleveland is here for follow up for her Stage IIIC high grade clear cell endometrial cancer, s/p RASHEEDA/BSO on 5/20/21 with hemicolectomy and stage 1 colon cancer found s/p adjuvant carbo/taxol 6 cycles completed on 10/25/21 with CT on 9/29/21 showing no evidence of disease but multiple clots including  RLL segmental PE, additional acute SMV and PV thrombus with evidence of cavernous transformation, acute R gonadal vein thrombus but no e/o metastatic disease. Her  normalized. Plan for radiation EBRT 50.4gy/28fx extended field due to common iliac node +, and plan to follow with VBT 12gy/2 due to LSI.  \par \par Since patient has started RT, today would be treatment 15/25, tolerating well. Missed treatment on 12/21 due to transportation logistics. No specific complaints. Denies headaches, change in vision, mouth sores/pain, chest pain, sob, abd pain, n/v/d/c, LE pain. Does have some left knee swelling that comes and goes. No dysuria, vaginal discharge/bleeding/pain. Does have neuropathy in her LLE that is tolerable. She has a subcutaneous nodule that developed on her left lower abdomen that started after injecting lovenox. It has not increased in size and has gotten smaller since. \par

## 2022-02-07 NOTE — PHYSICAL EXAM
[Restricted in physically strenuous activity but ambulatory and able to carry out work of a light or sedentary nature] : Status 1- Restricted in physically strenuous activity but ambulatory and able to carry out work of a light or sedentary nature, e.g., light house work, office work [Normal] : affect appropriate [de-identified] : well serenity surgical scars. LLQ subcutaneous nodule, nontender 2 x 2cm

## 2022-02-22 ENCOUNTER — APPOINTMENT (OUTPATIENT)
Dept: RADIATION ONCOLOGY | Facility: CLINIC | Age: 82
End: 2022-02-22
Payer: MEDICARE

## 2022-02-22 PROCEDURE — 99024 POSTOP FOLLOW-UP VISIT: CPT

## 2022-02-22 NOTE — REVIEW OF SYSTEMS
[Constipation: Grade 0] : Constipation: Grade 0 [Diarrhea: Grade 0] : Diarrhea: Grade 0 [Hematuria: Grade 0] : Hematuria: Grade 0 [Urinary Incontinence: Grade 0] : Urinary Incontinence: Grade 0  [Urinary Retention: Grade 0] : Urinary Retention: Grade 0 [Urinary Tract Pain: Grade 0] : Urinary Tract Pain: Grade 0 [Urinary Urgency: Grade 0] : Urinary Urgency: Grade 0 [Urinary Frequency: Grade 0] : Urinary Frequency: Grade 0

## 2022-02-25 ENCOUNTER — OUTPATIENT (OUTPATIENT)
Dept: OUTPATIENT SERVICES | Facility: HOSPITAL | Age: 82
LOS: 1 days | Discharge: HOME | End: 2022-02-25

## 2022-02-25 DIAGNOSIS — Z98.51 TUBAL LIGATION STATUS: Chronic | ICD-10-CM

## 2022-02-25 DIAGNOSIS — C54.1 MALIGNANT NEOPLASM OF ENDOMETRIUM: ICD-10-CM

## 2022-02-25 DIAGNOSIS — Z98.890 OTHER SPECIFIED POSTPROCEDURAL STATES: Chronic | ICD-10-CM

## 2022-02-27 NOTE — HISTORY OF PRESENT ILLNESS
[Home] : at home, [unfilled] , at the time of the visit. [Medical Office: ([facility])___] : at the medical office located in  [Verbal consent obtained from patient] : the patient, [unfilled] [FreeTextEntry1] : Ms. Cleveland is an 81 year old female with Stage IIIC1 Clear Cell Endometrial Carcinoma s/p RASHEEDA BSO, followed by 6 cycles adjuvant carbo/taxol and radiation therapy. \par \par Of note, she has history of Stage I colon cancer s/p hemicolectomy (5/20/21).\par \par History of Present Illness: \par 5/20/21 - RASHEEDA, BSO. Final path with 40% MMI, CSI+, LVI+, 1/1 obturator LN, 4/4 left common iliac LNs, no MARIANN. No PA ally sampling. \par 6/28/21 - 10/11/21 - 6 cycles adjuvant carbo/taxol.\par 11/30/21 - 1/21/22 - RT to pelvis/PA nodes (4,500cGy in 25 fractions) followed by VBT (1,200cGy in 2 fractions).\par \par She was called today for post-treatment evaluation. \par She states she is feeling well. Denies any pain. No urinary or bowel complaints. No vaginal discharge/bleeding.\par

## 2022-02-28 ENCOUNTER — APPOINTMENT (OUTPATIENT)
Dept: GYNECOLOGIC ONCOLOGY | Facility: CLINIC | Age: 82
End: 2022-02-28
Payer: MEDICARE

## 2022-02-28 ENCOUNTER — RESULT REVIEW (OUTPATIENT)
Age: 82
End: 2022-02-28

## 2022-02-28 ENCOUNTER — APPOINTMENT (OUTPATIENT)
Dept: HEMATOLOGY ONCOLOGY | Facility: CLINIC | Age: 82
End: 2022-02-28
Payer: MEDICARE

## 2022-02-28 VITALS
WEIGHT: 155.4 LBS | BODY MASS INDEX: 24.34 KG/M2 | HEART RATE: 65 BPM | RESPIRATION RATE: 16 BRPM | TEMPERATURE: 97.2 F | OXYGEN SATURATION: 99 % | DIASTOLIC BLOOD PRESSURE: 78 MMHG | SYSTOLIC BLOOD PRESSURE: 148 MMHG

## 2022-02-28 VITALS
HEART RATE: 65 BPM | WEIGHT: 155 LBS | HEIGHT: 67 IN | SYSTOLIC BLOOD PRESSURE: 172 MMHG | DIASTOLIC BLOOD PRESSURE: 80 MMHG | BODY MASS INDEX: 24.33 KG/M2

## 2022-02-28 DIAGNOSIS — I10 ESSENTIAL (PRIMARY) HYPERTENSION: ICD-10-CM

## 2022-02-28 LAB
BASOPHILS # BLD AUTO: 0.01 K/UL — SIGNIFICANT CHANGE UP (ref 0–0.2)
BASOPHILS NFR BLD AUTO: 0.3 % — SIGNIFICANT CHANGE UP (ref 0–2)
EOSINOPHIL # BLD AUTO: 0.13 K/UL — SIGNIFICANT CHANGE UP (ref 0–0.5)
EOSINOPHIL NFR BLD AUTO: 4.1 % — SIGNIFICANT CHANGE UP (ref 0–6)
HCT VFR BLD CALC: 35.6 % — SIGNIFICANT CHANGE UP (ref 34.5–45)
HGB BLD-MCNC: 11.3 G/DL — LOW (ref 11.5–15.5)
IMM GRANULOCYTES NFR BLD AUTO: 0.3 % — SIGNIFICANT CHANGE UP (ref 0–1.5)
LYMPHOCYTES # BLD AUTO: 0.81 K/UL — LOW (ref 1–3.3)
LYMPHOCYTES # BLD AUTO: 25.6 % — SIGNIFICANT CHANGE UP (ref 13–44)
MCHC RBC-ENTMCNC: 30.7 PG — SIGNIFICANT CHANGE UP (ref 27–34)
MCHC RBC-ENTMCNC: 31.7 G/DL — LOW (ref 32–36)
MCV RBC AUTO: 96.7 FL — SIGNIFICANT CHANGE UP (ref 80–100)
MONOCYTES # BLD AUTO: 0.5 K/UL — SIGNIFICANT CHANGE UP (ref 0–0.9)
MONOCYTES NFR BLD AUTO: 15.8 % — HIGH (ref 2–14)
NEUTROPHILS # BLD AUTO: 1.71 K/UL — LOW (ref 1.8–7.4)
NEUTROPHILS NFR BLD AUTO: 53.9 % — SIGNIFICANT CHANGE UP (ref 43–77)
NRBC # BLD: 0 /100 WBCS — SIGNIFICANT CHANGE UP (ref 0–0)
PLATELET # BLD AUTO: 160 K/UL — SIGNIFICANT CHANGE UP (ref 150–400)
RBC # BLD: 3.68 M/UL — LOW (ref 3.8–5.2)
RBC # FLD: 14.3 % — SIGNIFICANT CHANGE UP (ref 10.3–14.5)
WBC # BLD: 3.17 K/UL — LOW (ref 3.8–10.5)
WBC # FLD AUTO: 3.17 K/UL — LOW (ref 3.8–10.5)

## 2022-02-28 PROCEDURE — 99214 OFFICE O/P EST MOD 30 MIN: CPT

## 2022-03-01 LAB
CANCER AG125 SERPL-ACNC: 6 U/ML
MAGNESIUM SERPL-MCNC: 2.1 MG/DL

## 2022-03-22 ENCOUNTER — APPOINTMENT (OUTPATIENT)
Dept: SURGICAL ONCOLOGY | Facility: CLINIC | Age: 82
End: 2022-03-22
Payer: MEDICARE

## 2022-03-22 VITALS
WEIGHT: 160 LBS | OXYGEN SATURATION: 97 % | RESPIRATION RATE: 16 BRPM | BODY MASS INDEX: 25.11 KG/M2 | TEMPERATURE: 98.6 F | DIASTOLIC BLOOD PRESSURE: 72 MMHG | HEIGHT: 67 IN | HEART RATE: 50 BPM | SYSTOLIC BLOOD PRESSURE: 207 MMHG

## 2022-03-22 DIAGNOSIS — K63.89 OTHER SPECIFIED DISEASES OF INTESTINE: ICD-10-CM

## 2022-03-22 DIAGNOSIS — C18.9 MALIGNANT NEOPLASM OF COLON, UNSPECIFIED: ICD-10-CM

## 2022-03-22 PROBLEM — I10 ESSENTIAL HYPERTENSION: Status: ACTIVE | Noted: 2021-05-11

## 2022-03-22 PROCEDURE — 99213 OFFICE O/P EST LOW 20 MIN: CPT

## 2022-03-22 NOTE — REVIEW OF SYSTEMS
[Fatigue] : fatigue [Joint Pain] : joint pain [Negative] : Allergic/Immunologic [Recent Change In Weight] : ~T no recent weight change [Lower Ext Edema] : no lower extremity edema [SOB on Exertion] : no shortness of breath during exertion [Diarrhea] : no diarrhea [de-identified] : neuropathy

## 2022-03-22 NOTE — ADDENDUM
[FreeTextEntry1] : I, Sohail Johnson, acted solely as a scribe for Dr. Hans Jeffries on 02/28/2022. All medical entries made by the Scribe were at my, Dr. Hans Jeffries's, direction and personally dictated by me on 02/28/2022. I have reviewed the chart and agree that the record accurately reflects my personal performance of the history, physical exam, assessment and plan. I have also personally directed, reviewed, and agreed with the chart.

## 2022-03-22 NOTE — PHYSICAL EXAM
[Restricted in physically strenuous activity but ambulatory and able to carry out work of a light or sedentary nature] : Status 1- Restricted in physically strenuous activity but ambulatory and able to carry out work of a light or sedentary nature, e.g., light house work, office work [Normal] : affect appropriate [de-identified] : well healed surgical scars. LLQ subcutaneous nodule, nontender 2 x 2cm

## 2022-03-22 NOTE — REVIEW OF SYSTEMS
[Fatigue] : fatigue [Joint Pain] : joint pain [Negative] : Allergic/Immunologic [Recent Change In Weight] : ~T no recent weight change [Lower Ext Edema] : no lower extremity edema [SOB on Exertion] : no shortness of breath during exertion [Diarrhea] : no diarrhea [de-identified] : neuropathy

## 2022-03-22 NOTE — PHYSICAL EXAM
[Restricted in physically strenuous activity but ambulatory and able to carry out work of a light or sedentary nature] : Status 1- Restricted in physically strenuous activity but ambulatory and able to carry out work of a light or sedentary nature, e.g., light house work, office work [Normal] : affect appropriate [de-identified] : well healed surgical scars. LLQ subcutaneous nodule, nontender 2 x 2cm

## 2022-03-22 NOTE — HISTORY OF PRESENT ILLNESS
[Disease: _____________________] : Disease: [unfilled] [AJCC Stage: ____] : AJCC Stage: [unfilled] [de-identified] : \par Referred by Dr. Cox \par \par Ms. 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 In addition, the patient is s/p micki-colectomy by Dr. Reaves and found to have stage I colon cancer.\par \par The patient reports that she felt well until January 25th 2021 when she had an episode of vaginal bleeding. She has never had post-menopausal bleeding prior to this episode. She presented to local urgent care and subsequently underwent further workup at [facility]. Her oncologic workup is summarized below:\par \par 2/4/21 pelvic US with markedly enlarged uterus, d/t fibroids 13 x 8 x 9 cm\par Endometrial lining 24mm, b/l ovaries not visualized\par \par 3/27/21 pelvic MRI with external iliac LAD measuring 1.7x1.7, with a 9x6mm LN, 13cm fibroid uterus\par \par 4/29/21 D&C\par  EMC- endometrial adenocarcinoma, high grade predominantly c/w serous carcinom and focal areas concerning for clear cell carcinoma\par \par 5/15/21 CT CAP: large left thyroid nodule mildly narrowing the trachea, partially cystic/solid, calcified uterine myomas 5cm, ileocecal intussusception with 5.8cm soft tissue mass lead point which may represent tumor, and para aortic & pelvic LAD, 1.6cm distal left PA LN, 1.7cm left common iliac LN\par \par She was subseuqently referred to gyn onc evaluation and saw Dr. Cox. On 5/20/21, she underwent RASHEEDA/BSO staging surgery which revealed grade 3 clear cell endometrial carcinoma, 40% myometrial invasion, CSI+, LVI+, with ITCs in the right pelvic LN, 1.5cm tumor in left obturator LN, 4/4 left common iliac LNs. No PA ally sampling. Pelvic wash was negative. Final surgical stage [id number], Stage IIIC1. \par \par In addition, she underwent hemicolectomy with Dr. Osiel Reaves on 5/20/21.\par Surgical pathology: right colectomy specimen shows 4 mm invasive moderately-differentiated adenocarcinoma arising from a large tubulovillous adenoma. Staging I T1N0 (0/12 nodes). MMR intact protein expression. \par \par She has been referred to medical oncology for consideration of adjuvant chemotherapy for her endometrial cancer. She feels well overall and has been recovering from her surgery without significant issues. She is eating and drinking without n/v. She is having regular BMs. She denies fevers, chills, n/v, abdominal pain, neuropathy, vaginal discharge or bleeding, urinary symptoms, cough, CP, SOB.\par \par \par PMHx: DM, HTN, hypothyroid \par Meds: metformin, levothyroxine, lipitor, norvasc\par \par Family Hx of Cancer: n/a\par SH: Denies smoking or alcohol use; \par lives with daughter Carin, , has 4 adult children\par \par HM\par Pap- n/a \par Mammo- n/a\par Colonoscopy- 5/16/21.  [de-identified] : stage I colon cancer. [FreeTextEntry1] : surgery on 5/20/21 --> adjuvant Carbo/Taxol June 28, 2021 s/p cycle 6/6 on 10/11/2021--> EBRT 25 tx started on 12/1/21 [de-identified] : Ms. Cleveland is here for follow up while on adjuvant RT (finishing 1/11/2022).  She feels very tired.  Her nails are back to normal color.   She c/o itching near the port site.  Her BP is high (baseline in office reading) but she states at home SBP is 150's.  She self decreased her Lovenox to once a day as she is upset she has a lot of lumps on her abdomen from it now.  She continues to have stable grade 1 neuropathy but denies any mouth sores, CP, palpitations, cough, JOHN, n/v/d/c, abdominal pain, LE edema, vaginal discharge or bleeding, urinary symptoms, excessive bruising, dizziness, headaches, arthralgias, myalgias, weight/appetite changes and tries to keeps active. \par \par \par

## 2022-03-22 NOTE — HISTORY OF PRESENT ILLNESS
[Disease: _____________________] : Disease: [unfilled] [AJCC Stage: ____] : AJCC Stage: [unfilled] [de-identified] : Referred by Dr. Cox \par \par Ms. 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 In addition, the patient is s/p micki-colectomy by Dr. Reaves and found to have stage I colon cancer.\par \par The patient reports that she felt well until January 25th 2021 when she had an episode of vaginal bleeding. She has never had post-menopausal bleeding prior to this episode. She presented to local urgent care and subsequently underwent further workup at [facility]. Her oncologic workup is summarized below:\par \par 2/4/21 pelvic US with markedly enlarged uterus, d/t fibroids 13 x 8 x 9 cm\par Endometrial lining 24mm, b/l ovaries not visualized\par \par 3/27/21 pelvic MRI with external iliac LAD measuring 1.7x1.7, with a 9x6mm LN, 13cm fibroid uterus\par \par 4/29/21 D&C\par  EMC- endometrial adenocarcinoma, high grade predominantly c/w serous carcinom and focal areas concerning for clear cell carcinoma\par \par 5/15/21 CT CAP: large left thyroid nodule mildly narrowing the trachea, partially cystic/solid, calcified uterine myomas 5cm, ileocecal intussusception with 5.8cm soft tissue mass lead point which may represent tumor, and para aortic & pelvic LAD, 1.6cm distal left PA LN, 1.7cm left common iliac LN\par \par She was subseuqently referred to gyn onc evaluation and saw Dr. Cox. On 5/20/21, she underwent RASHEEDA/BSO staging surgery which revealed grade 3 clear cell endometrial carcinoma, 40% myometrial invasion, CSI+, LVI+, with ITCs in the right pelvic LN, 1.5cm tumor in left obturator LN, 4/4 left common iliac LNs. No PA ally sampling. Pelvic wash was negative. Final surgical stage [id number], Stage IIIC1. \par \par In addition, she underwent hemicolectomy with Dr. Osiel Reaves on 5/20/21.\par Surgical pathology: right colectomy specimen shows 4 mm invasive moderately-differentiated adenocarcinoma arising from a large tubulovillous adenoma. Staging I T1N0 (0/12 nodes). MMR intact protein expression. \par \par She has been referred to medical oncology for consideration of adjuvant chemotherapy for her endometrial cancer. She feels well overall and has been recovering from her surgery without significant issues. She is eating and drinking without n/v. She is having regular BMs. She denies fevers, chills, n/v, abdominal pain, neuropathy, vaginal discharge or bleeding, urinary symptoms, cough, CP, SOB.\par \par \par PMHx: DM, HTN, hypothyroid \par Meds: metformin, levothyroxine, lipitor, norvasc\par \par Family Hx of Cancer: n/a\par SH: Denies smoking or alcohol use; \par lives with daughter Carin, , has 4 adult children\par \par HM\par Pap- n/a \par Mammo- n/a\par Colonoscopy- 5/16/21 [de-identified] : stage I colon cancer. [FreeTextEntry1] : surgery on 5/20/21 --> adjuvant Carbo/Taxol June 28, 2021 s/p cycle 6/6 on 10/11/2021--> EBRT 25 tx started on 12/1/21 [de-identified] : Ms. Cleveland is here for follow up after completing adjuvant RT (finished 1/26/2022). She feels well.  She continues to have stable grade 1 neuropathy. It is worse in her feet and at night. She says it is slowly getting better. It is uncomfortable, but not painful. She reports it causes her to be off balanced at times. She denies any mouth sores, CP, palpitations, cough, JOHN, n/v/d/c, abdominal pain, LE edema, vaginal discharge or bleeding, urinary symptoms, excessive bruising, dizziness, headaches, arthralgias, myalgias, weight/appetite changes and tries to keeps active. Appetite is improving. Bowel movements are normal. \par \par \par

## 2022-03-23 PROBLEM — K63.89 MASS OF HEPATIC FLEXURE OF COLON: Status: ACTIVE | Noted: 2021-05-17

## 2022-03-23 NOTE — ASSESSMENT
[FreeTextEntry1] : Doing well.\par Should have colonoscopy in 05/2022 - one year from surgery.\par Follow up in office in one year.\par Imaging per medical oncology.

## 2022-03-23 NOTE — HISTORY OF PRESENT ILLNESS
[de-identified] : Patient is s/p combined robotic RASHEEDA/BSO/PPALND and right colectomy 05/20/2021.\par She feels well and has minimal pain.\par Tolerating diet well.\par Pathology: right colectomy specimen shows 4 mm invasive moderately-differentiated adenocarcinoma arising from a large tubulovillous adenoma.  Staging T1N0 (0/12).\par \par 09/07/2021: Patient is currently undergoing adjuvant treatment for endometrial cancer.  She reports decreased appetite with treatment, but overall tolerating well.  Her weight has been stable.\par \par 03/22/2022: Patient has completed adjuvant radiation for her endometrial cancer.  She tolerated treatment well.  She denies abdominal pain, nausea/emesis or weight loss.  Her bowel movements are regular and without blood or melena.  She has not had recent imaging.

## 2022-03-24 ENCOUNTER — APPOINTMENT (OUTPATIENT)
Dept: HEMATOLOGY ONCOLOGY | Facility: CLINIC | Age: 82
End: 2022-03-24
Payer: MEDICARE

## 2022-03-24 ENCOUNTER — APPOINTMENT (OUTPATIENT)
Dept: CT IMAGING | Facility: IMAGING CENTER | Age: 82
End: 2022-03-24
Payer: MEDICARE

## 2022-03-24 ENCOUNTER — RESULT REVIEW (OUTPATIENT)
Age: 82
End: 2022-03-24

## 2022-03-24 ENCOUNTER — OUTPATIENT (OUTPATIENT)
Dept: OUTPATIENT SERVICES | Facility: HOSPITAL | Age: 82
LOS: 1 days | Discharge: HOME | End: 2022-03-24

## 2022-03-24 ENCOUNTER — APPOINTMENT (OUTPATIENT)
Dept: INFUSION THERAPY | Facility: HOSPITAL | Age: 82
End: 2022-03-24

## 2022-03-24 ENCOUNTER — OUTPATIENT (OUTPATIENT)
Dept: OUTPATIENT SERVICES | Facility: HOSPITAL | Age: 82
LOS: 1 days | End: 2022-03-24
Payer: COMMERCIAL

## 2022-03-24 VITALS
OXYGEN SATURATION: 98 % | HEIGHT: 66.97 IN | DIASTOLIC BLOOD PRESSURE: 84 MMHG | TEMPERATURE: 97 F | SYSTOLIC BLOOD PRESSURE: 160 MMHG | HEART RATE: 83 BPM | WEIGHT: 155.4 LBS | BODY MASS INDEX: 24.39 KG/M2 | RESPIRATION RATE: 17 BRPM

## 2022-03-24 DIAGNOSIS — Z98.51 TUBAL LIGATION STATUS: Chronic | ICD-10-CM

## 2022-03-24 DIAGNOSIS — Z98.890 OTHER SPECIFIED POSTPROCEDURAL STATES: Chronic | ICD-10-CM

## 2022-03-24 DIAGNOSIS — D64.9 ANEMIA, UNSPECIFIED: ICD-10-CM

## 2022-03-24 DIAGNOSIS — C54.1 MALIGNANT NEOPLASM OF ENDOMETRIUM: ICD-10-CM

## 2022-03-24 PROCEDURE — 74177 CT ABD & PELVIS W/CONTRAST: CPT

## 2022-03-24 PROCEDURE — 99214 OFFICE O/P EST MOD 30 MIN: CPT

## 2022-03-24 PROCEDURE — 71260 CT THORAX DX C+: CPT | Mod: 26

## 2022-03-24 PROCEDURE — 71260 CT THORAX DX C+: CPT

## 2022-03-24 PROCEDURE — 74177 CT ABD & PELVIS W/CONTRAST: CPT | Mod: 26

## 2022-03-24 RX ORDER — ENOXAPARIN SODIUM 100 MG/ML
80 INJECTION SUBCUTANEOUS
Qty: 3 | Refills: 3 | Status: DISCONTINUED | COMMUNITY
Start: 2021-10-04 | End: 2022-03-24

## 2022-03-24 RX ORDER — METOPROLOL TARTRATE 75 MG/1
TABLET, FILM COATED ORAL
Refills: 0 | Status: DISCONTINUED | COMMUNITY
End: 2022-03-24

## 2022-03-24 RX ORDER — METOPROLOL SUCCINATE 50 MG/1
50 TABLET, EXTENDED RELEASE ORAL
Qty: 90 | Refills: 0 | Status: ACTIVE | COMMUNITY
Start: 2022-02-23

## 2022-03-24 RX ORDER — POTASSIUM CHLORIDE 1500 MG/1
20 TABLET, FILM COATED, EXTENDED RELEASE ORAL AS DIRECTED
Qty: 4 | Refills: 0 | Status: DISCONTINUED | COMMUNITY
Start: 2021-05-18 | End: 2022-03-24

## 2022-03-24 RX ORDER — APIXABAN 5 MG (74)
5 KIT ORAL
Qty: 1 | Refills: 0 | Status: DISCONTINUED | COMMUNITY
Start: 2022-01-10 | End: 2022-03-24

## 2022-04-05 ENCOUNTER — RESULT REVIEW (OUTPATIENT)
Age: 82
End: 2022-04-05

## 2022-04-05 ENCOUNTER — APPOINTMENT (OUTPATIENT)
Dept: INFUSION THERAPY | Facility: HOSPITAL | Age: 82
End: 2022-04-05

## 2022-04-05 LAB
ALBUMIN SERPL ELPH-MCNC: 4 G/DL — SIGNIFICANT CHANGE UP (ref 3.3–5)
ALP SERPL-CCNC: 98 U/L — SIGNIFICANT CHANGE UP (ref 40–120)
ALT FLD-CCNC: 7 U/L — LOW (ref 10–45)
ANION GAP SERPL CALC-SCNC: 12 MMOL/L — SIGNIFICANT CHANGE UP (ref 5–17)
AST SERPL-CCNC: 15 U/L — SIGNIFICANT CHANGE UP (ref 10–40)
BILIRUB SERPL-MCNC: 0.6 MG/DL — SIGNIFICANT CHANGE UP (ref 0.2–1.2)
BUN SERPL-MCNC: 14 MG/DL — SIGNIFICANT CHANGE UP (ref 7–23)
CALCIUM SERPL-MCNC: 10.2 MG/DL — SIGNIFICANT CHANGE UP (ref 8.4–10.5)
CANCER AG125 SERPL-ACNC: 7 U/ML — SIGNIFICANT CHANGE UP
CHLORIDE SERPL-SCNC: 106 MMOL/L — SIGNIFICANT CHANGE UP (ref 96–108)
CO2 SERPL-SCNC: 24 MMOL/L — SIGNIFICANT CHANGE UP (ref 22–31)
CREAT SERPL-MCNC: 0.99 MG/DL — SIGNIFICANT CHANGE UP (ref 0.5–1.3)
EGFR: 57 ML/MIN/1.73M2 — LOW
GLUCOSE SERPL-MCNC: 99 MG/DL — SIGNIFICANT CHANGE UP (ref 70–99)
HCT VFR BLD CALC: 34.2 % — LOW (ref 34.5–45)
HGB BLD-MCNC: 10.8 G/DL — LOW (ref 11.5–15.5)
MAGNESIUM SERPL-MCNC: 2 MG/DL — SIGNIFICANT CHANGE UP (ref 1.6–2.6)
MCHC RBC-ENTMCNC: 31 PG — SIGNIFICANT CHANGE UP (ref 27–34)
MCHC RBC-ENTMCNC: 31.6 G/DL — LOW (ref 32–36)
MCV RBC AUTO: 98.3 FL — SIGNIFICANT CHANGE UP (ref 80–100)
PLATELET # BLD AUTO: 130 K/UL — LOW (ref 150–400)
POTASSIUM SERPL-MCNC: 4.4 MMOL/L — SIGNIFICANT CHANGE UP (ref 3.5–5.3)
POTASSIUM SERPL-SCNC: 4.4 MMOL/L — SIGNIFICANT CHANGE UP (ref 3.5–5.3)
PROT SERPL-MCNC: 6.4 G/DL — SIGNIFICANT CHANGE UP (ref 6–8.3)
RBC # BLD: 3.48 M/UL — LOW (ref 3.8–5.2)
RBC # FLD: 13 % — SIGNIFICANT CHANGE UP (ref 10.3–14.5)
SODIUM SERPL-SCNC: 142 MMOL/L — SIGNIFICANT CHANGE UP (ref 135–145)
WBC # BLD: 2.8 K/UL — LOW (ref 3.8–10.5)
WBC # FLD AUTO: 2.8 K/UL — LOW (ref 3.8–10.5)

## 2022-04-06 DIAGNOSIS — C54.1 MALIGNANT NEOPLASM OF ENDOMETRIUM: ICD-10-CM

## 2022-04-11 LAB
BASOPHILS # BLD AUTO: 0.01 K/UL — SIGNIFICANT CHANGE UP (ref 0–0.2)
BASOPHILS NFR BLD AUTO: 0.4 % — SIGNIFICANT CHANGE UP (ref 0–2)
EOSINOPHIL # BLD AUTO: 0.13 K/UL — SIGNIFICANT CHANGE UP (ref 0–0.5)
EOSINOPHIL NFR BLD AUTO: 4.6 % — SIGNIFICANT CHANGE UP (ref 0–6)
IMM GRANULOCYTES NFR BLD AUTO: 0.4 % — SIGNIFICANT CHANGE UP (ref 0–1.5)
LYMPHOCYTES # BLD AUTO: 0.58 K/UL — LOW (ref 1–3.3)
LYMPHOCYTES # BLD AUTO: 20.7 % — SIGNIFICANT CHANGE UP (ref 13–44)
MONOCYTES # BLD AUTO: 0.43 K/UL — SIGNIFICANT CHANGE UP (ref 0–0.9)
MONOCYTES NFR BLD AUTO: 15.4 % — HIGH (ref 2–14)
NEUTROPHILS # BLD AUTO: 1.64 K/UL — LOW (ref 1.8–7.4)
NEUTROPHILS NFR BLD AUTO: 58.5 % — SIGNIFICANT CHANGE UP (ref 43–77)
NRBC # BLD: 0 /100 WBCS — SIGNIFICANT CHANGE UP (ref 0–0)

## 2022-04-14 ENCOUNTER — APPOINTMENT (OUTPATIENT)
Dept: HEMATOLOGY ONCOLOGY | Facility: CLINIC | Age: 82
End: 2022-04-14
Payer: MEDICARE

## 2022-04-14 DIAGNOSIS — E04.1 NONTOXIC SINGLE THYROID NODULE: ICD-10-CM

## 2022-04-14 DIAGNOSIS — I26.99 OTHER PULMONARY EMBOLISM W/OUT ACUTE COR PULMONALE: ICD-10-CM

## 2022-04-14 DIAGNOSIS — M79.2 NEURALGIA AND NEURITIS, UNSPECIFIED: ICD-10-CM

## 2022-04-14 PROCEDURE — 99443: CPT

## 2022-04-14 NOTE — PHYSICAL EXAM
[Restricted in physically strenuous activity but ambulatory and able to carry out work of a light or sedentary nature] : Status 1- Restricted in physically strenuous activity but ambulatory and able to carry out work of a light or sedentary nature, e.g., light house work, office work [Normal] : affect appropriate [de-identified] : well healed surgical scars. LLQ subcutaneous nodule, nontender 2 x 2cm

## 2022-04-30 PROBLEM — M79.2 NEUROPATHIC PAIN: Status: ACTIVE | Noted: 2021-07-23

## 2022-04-30 PROBLEM — I26.99 PULMONARY EMBOLISM: Status: ACTIVE | Noted: 2021-10-04

## 2022-04-30 PROBLEM — E04.1 THYROID NODULE: Status: ACTIVE | Noted: 2022-04-30

## 2022-04-30 NOTE — HISTORY OF PRESENT ILLNESS
[Disease: _____________________] : Disease: [unfilled] [AJCC Stage: ____] : AJCC Stage: [unfilled] [Home] : at home, [unfilled] , at the time of the visit. [Medical Office: ([facility])___] : at the medical office located in  [Verbal consent obtained from patient] : the patient, [unfilled] [de-identified] : Referred by Dr. Cox \par \par Ms. 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 In addition, the patient is s/p micki-colectomy by Dr. Reaves and found to have stage I colon cancer.\par \par The patient reports that she felt well until January 25th 2021 when she had an episode of vaginal bleeding. She has never had post-menopausal bleeding prior to this episode. She presented to local urgent care and subsequently underwent further workup at [facility]. Her oncologic workup is summarized below:\par \par 2/4/21 pelvic US with markedly enlarged uterus, d/t fibroids 13 x 8 x 9 cm\par Endometrial lining 24mm, b/l ovaries not visualized\par \par 3/27/21 pelvic MRI with external iliac LAD measuring 1.7x1.7, with a 9x6mm LN, 13cm fibroid uterus\par \par 4/29/21 D&C\par  EMC- endometrial adenocarcinoma, high grade predominantly c/w serous carcinom and focal areas concerning for clear cell carcinoma\par \par 5/15/21 CT CAP: large left thyroid nodule mildly narrowing the trachea, partially cystic/solid, calcified uterine myomas 5cm, ileocecal intussusception with 5.8cm soft tissue mass lead point which may represent tumor, and para aortic & pelvic LAD, 1.6cm distal left PA LN, 1.7cm left common iliac LN\par \par She was subseuqently referred to gyn onc evaluation and saw Dr. Cox. On 5/20/21, she underwent RASHEEDA/BSO staging surgery which revealed grade 3 clear cell endometrial carcinoma, 40% myometrial invasion, CSI+, LVI+, with ITCs in the right pelvic LN, 1.5cm tumor in left obturator LN, 4/4 left common iliac LNs. No PA ally sampling. Pelvic wash was negative. Final surgical stage [id number], Stage IIIC1. \par \par In addition, she underwent hemicolectomy with Dr. Osiel Reaves on 5/20/21.\par Surgical pathology: right colectomy specimen shows 4 mm invasive moderately-differentiated adenocarcinoma arising from a large tubulovillous adenoma. Staging I T1N0 (0/12 nodes). MMR intact protein expression. \par \par She has been referred to medical oncology for consideration of adjuvant chemotherapy for her endometrial cancer. She feels well overall and has been recovering from her surgery without significant issues. She is eating and drinking without n/v. She is having regular BMs. She denies fevers, chills, n/v, abdominal pain, neuropathy, vaginal discharge or bleeding, urinary symptoms, cough, CP, SOB.\par \par \par PMHx: DM, HTN, hypothyroid \par Meds: metformin, levothyroxine, lipitor, norvasc\par \par Family Hx of Cancer: n/a\par SH: Denies smoking or alcohol use; \par lives with daughter Carin, , has 4 adult children\par \par HM\par Pap- n/a \par Mammo- n/a\par Colonoscopy- 5/16/21 [de-identified] : stage I colon cancer. [FreeTextEntry1] : surgery on 5/20/21 --> adjuvant Carbo/Taxol June 28, 2021 s/p cycle 6/6 on 10/11/2021--> EBRT 25 tx 12/1/21 - 1/26/2022 [de-identified] : Ms. Cleveland is here for follow up while on surveillance and in the interim, had scans which showed no evidence of adenopathy. Trace pelvic ascites is unchanged. 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 Resolution of right gonadal vein thrombus. 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 She still has foot cramping but it is much better. 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 She uses gabapentin for relief. She denies any mouth sores, CP, palpitations, cough, JOHN, n/v/d/c, abdominal pain, LE edema, vaginal discharge or bleeding, urinary symptoms, excessive bruising, dizziness, headaches, arthralgias, myalgias, weight/appetite changes and tries to keeps active. \par \par \par \par \par \par

## 2022-04-30 NOTE — ADDENDUM
[FreeTextEntry1] : I, Sohail Alex, acted solely as a scribe for Dr. Hans Jeffries on 04/14/2022. All medical entries made by the Scribe were at my, Dr. Hans Jeffries's, direction and personally dictated by me on 04/14/2022. I have reviewed the chart and agree that the record accurately reflects my personal performance of the history, physical exam, assessment and plan. I have also personally directed, reviewed, and agreed with the chart.

## 2022-04-30 NOTE — HISTORY OF PRESENT ILLNESS
[Disease: _____________________] : Disease: [unfilled] [AJCC Stage: ____] : AJCC Stage: [unfilled] [de-identified] : Referred by Dr. Cox \par \par Ms. 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 In addition, the patient is s/p micki-colectomy by Dr. Reaves and found to have stage I colon cancer.\par \par The patient reports that she felt well until January 25th 2021 when she had an episode of vaginal bleeding. She has never had post-menopausal bleeding prior to this episode. She presented to local urgent care and subsequently underwent further workup at [facility]. Her oncologic workup is summarized below:\par \par 2/4/21 pelvic US with markedly enlarged uterus, d/t fibroids 13 x 8 x 9 cm\par Endometrial lining 24mm, b/l ovaries not visualized\par \par 3/27/21 pelvic MRI with external iliac LAD measuring 1.7x1.7, with a 9x6mm LN, 13cm fibroid uterus\par \par 4/29/21 D&C\par  EMC- endometrial adenocarcinoma, high grade predominantly c/w serous carcinom and focal areas concerning for clear cell carcinoma\par \par 5/15/21 CT CAP: large left thyroid nodule mildly narrowing the trachea, partially cystic/solid, calcified uterine myomas 5cm, ileocecal intussusception with 5.8cm soft tissue mass lead point which may represent tumor, and para aortic & pelvic LAD, 1.6cm distal left PA LN, 1.7cm left common iliac LN\par \par She was subseuqently referred to gyn onc evaluation and saw Dr. Cox. On 5/20/21, she underwent RASHEEDA/BSO staging surgery which revealed grade 3 clear cell endometrial carcinoma, 40% myometrial invasion, CSI+, LVI+, with ITCs in the right pelvic LN, 1.5cm tumor in left obturator LN, 4/4 left common iliac LNs. No PA ally sampling. Pelvic wash was negative. Final surgical stage [id number], Stage IIIC1. \par \par In addition, she underwent hemicolectomy with Dr. Osiel Reaves on 5/20/21.\par Surgical pathology: right colectomy specimen shows 4 mm invasive moderately-differentiated adenocarcinoma arising from a large tubulovillous adenoma. Staging I T1N0 (0/12 nodes). MMR intact protein expression. \par \par She has been referred to medical oncology for consideration of adjuvant chemotherapy for her endometrial cancer. She feels well overall and has been recovering from her surgery without significant issues. She is eating and drinking without n/v. She is having regular BMs. She denies fevers, chills, n/v, abdominal pain, neuropathy, vaginal discharge or bleeding, urinary symptoms, cough, CP, SOB.\par \par \par PMHx: DM, HTN, hypothyroid \par Meds: metformin, levothyroxine, lipitor, norvasc\par \par Family Hx of Cancer: n/a\par SH: Denies smoking or alcohol use; \par lives with daughter Carin, , has 4 adult children\par \par HM\par Pap- n/a \par Mammo- n/a\par Colonoscopy- 5/16/21 [de-identified] : stage I colon cancer. [FreeTextEntry1] : surgery on 5/20/21 --> adjuvant Carbo/Taxol June 28, 2021 s/p cycle 6/6 on 10/11/2021--> EBRT 25 tx 12/1/21 - 1/26/2022 [de-identified] : Ms. Cleveland is here for follow up while on surveillance and in the interim, she saw Dr Trejo and had a normal vaginal exam on 2/28/2022. Her BP is elevated today but patient states she is nervous and at home her BP is normal.  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  She feels well overall and denies any mouth sores, CP, palpitations, cough, JOHN, n/v/d/c, abdominal pain, LE edema, vaginal discharge or bleeding, urinary symptoms, excessive bruising, dizziness, headaches, arthralgias, myalgias, weight/appetite changes and tries to keeps active. \par \par \par colonoscoyp 05/2022 - on**\par     -RTO in 1 month (after scans completed)\par     \par     All questions/concerns addressed to apparent satisfaction.\par Pruritus (698.9) (L29.9)\par  · -likely from dry skin\par     -Advised to keep hydrated and apply non-scented creams/oils to keep moisturized\par Neuropathic pain (729.2) (M79.2)\par  · -Stable grade 1 neuropathy \par     -Continue Gabapentin\par     -Begin alpha lepoic acid\par     -Discussed use of massage, acupuncture, acupressure, or reflexology for symptomatic\par     relief\par     -Monitor closely\par \par \par \par

## 2022-04-30 NOTE — REVIEW OF SYSTEMS
[Fatigue] : fatigue [Joint Pain] : joint pain [Negative] : Allergic/Immunologic [Recent Change In Weight] : ~T no recent weight change [Lower Ext Edema] : no lower extremity edema [SOB on Exertion] : no shortness of breath during exertion [Diarrhea] : no diarrhea [de-identified] : neuropathy

## 2022-04-30 NOTE — PHYSICAL EXAM
[Restricted in physically strenuous activity but ambulatory and able to carry out work of a light or sedentary nature] : Status 1- Restricted in physically strenuous activity but ambulatory and able to carry out work of a light or sedentary nature, e.g., light house work, office work [Normal] : affect appropriate [de-identified] : well healed surgical scars. LLQ subcutaneous nodule, nontender 2 x 2cm

## 2022-04-30 NOTE — REVIEW OF SYSTEMS
[Negative] : Constitutional [Recent Change In Weight] : ~T no recent weight change [Lower Ext Edema] : no lower extremity edema [SOB on Exertion] : no shortness of breath during exertion [Diarrhea] : no diarrhea [de-identified] : neuropathy  [FreeTextEntry9] : foot cramping

## 2022-05-02 ENCOUNTER — NON-APPOINTMENT (OUTPATIENT)
Age: 82
End: 2022-05-02

## 2022-05-19 ENCOUNTER — OUTPATIENT (OUTPATIENT)
Dept: OUTPATIENT SERVICES | Facility: HOSPITAL | Age: 82
LOS: 1 days | Discharge: HOME | End: 2022-05-19

## 2022-05-19 DIAGNOSIS — Z98.890 OTHER SPECIFIED POSTPROCEDURAL STATES: Chronic | ICD-10-CM

## 2022-05-19 DIAGNOSIS — Z98.51 TUBAL LIGATION STATUS: Chronic | ICD-10-CM

## 2022-05-19 DIAGNOSIS — D64.9 ANEMIA, UNSPECIFIED: ICD-10-CM

## 2022-05-23 RX ORDER — SAW/PYGEUM/BETA/HERB/D3/B6/ZN 30 MG-25MG
200 CAPSULE ORAL DAILY
Qty: 60 | Refills: 0 | Status: ACTIVE | COMMUNITY
Start: 2022-03-24 | End: 1900-01-01

## 2022-05-24 ENCOUNTER — APPOINTMENT (OUTPATIENT)
Dept: INFUSION THERAPY | Facility: HOSPITAL | Age: 82
End: 2022-05-24

## 2022-06-03 ENCOUNTER — APPOINTMENT (OUTPATIENT)
Dept: GYNECOLOGIC ONCOLOGY | Facility: CLINIC | Age: 82
End: 2022-06-03
Payer: MEDICARE

## 2022-06-03 VITALS
WEIGHT: 156 LBS | HEIGHT: 66.97 IN | BODY MASS INDEX: 24.48 KG/M2 | DIASTOLIC BLOOD PRESSURE: 76 MMHG | SYSTOLIC BLOOD PRESSURE: 168 MMHG | HEART RATE: 69 BPM

## 2022-06-03 PROCEDURE — 99214 OFFICE O/P EST MOD 30 MIN: CPT

## 2022-06-28 NOTE — PROVIDER CONTACT NOTE (OTHER) - BACKGROUND
1st attempt: LVM for patient to call back and schedule a cpe in 4 weeks and  [facility]   S/P  TLH/ BSO SLN mapping PPLN dissection partial hemicolectomy

## 2022-07-12 ENCOUNTER — OUTPATIENT (OUTPATIENT)
Dept: OUTPATIENT SERVICES | Facility: HOSPITAL | Age: 82
LOS: 1 days | Discharge: HOME | End: 2022-07-12

## 2022-07-12 DIAGNOSIS — D64.9 ANEMIA, UNSPECIFIED: ICD-10-CM

## 2022-07-12 DIAGNOSIS — Z98.890 OTHER SPECIFIED POSTPROCEDURAL STATES: Chronic | ICD-10-CM

## 2022-07-12 DIAGNOSIS — Z98.51 TUBAL LIGATION STATUS: Chronic | ICD-10-CM

## 2022-07-25 ENCOUNTER — RESULT REVIEW (OUTPATIENT)
Age: 82
End: 2022-07-25

## 2022-07-25 ENCOUNTER — APPOINTMENT (OUTPATIENT)
Dept: HEMATOLOGY ONCOLOGY | Facility: CLINIC | Age: 82
End: 2022-07-25

## 2022-07-25 ENCOUNTER — APPOINTMENT (OUTPATIENT)
Dept: INFUSION THERAPY | Facility: HOSPITAL | Age: 82
End: 2022-07-25

## 2022-07-25 VITALS
HEIGHT: 66.93 IN | SYSTOLIC BLOOD PRESSURE: 171 MMHG | HEART RATE: 42 BPM | BODY MASS INDEX: 24.39 KG/M2 | OXYGEN SATURATION: 99 % | WEIGHT: 155.4 LBS | DIASTOLIC BLOOD PRESSURE: 81 MMHG | TEMPERATURE: 97 F | RESPIRATION RATE: 16 BRPM

## 2022-07-25 LAB
ALBUMIN SERPL ELPH-MCNC: 4.2 G/DL — SIGNIFICANT CHANGE UP (ref 3.3–5)
ALP SERPL-CCNC: 111 U/L — SIGNIFICANT CHANGE UP (ref 40–120)
ALT FLD-CCNC: 13 U/L — SIGNIFICANT CHANGE UP (ref 10–45)
ANION GAP SERPL CALC-SCNC: 9 MMOL/L — SIGNIFICANT CHANGE UP (ref 5–17)
AST SERPL-CCNC: 19 U/L — SIGNIFICANT CHANGE UP (ref 10–40)
BASOPHILS # BLD AUTO: 0 K/UL — SIGNIFICANT CHANGE UP (ref 0–0.2)
BASOPHILS NFR BLD AUTO: 0 % — SIGNIFICANT CHANGE UP (ref 0–2)
BILIRUB SERPL-MCNC: 0.7 MG/DL — SIGNIFICANT CHANGE UP (ref 0.2–1.2)
BUN SERPL-MCNC: 11 MG/DL — SIGNIFICANT CHANGE UP (ref 7–23)
CALCIUM SERPL-MCNC: 10.2 MG/DL — SIGNIFICANT CHANGE UP (ref 8.4–10.5)
CANCER AG125 SERPL-ACNC: 5 U/ML — SIGNIFICANT CHANGE UP
CHLORIDE SERPL-SCNC: 106 MMOL/L — SIGNIFICANT CHANGE UP (ref 96–108)
CO2 SERPL-SCNC: 27 MMOL/L — SIGNIFICANT CHANGE UP (ref 22–31)
CREAT SERPL-MCNC: 1.14 MG/DL — SIGNIFICANT CHANGE UP (ref 0.5–1.3)
EGFR: 48 ML/MIN/1.73M2 — LOW
EOSINOPHIL # BLD AUTO: 0.15 K/UL — SIGNIFICANT CHANGE UP (ref 0–0.5)
EOSINOPHIL NFR BLD AUTO: 6 % — SIGNIFICANT CHANGE UP (ref 0–6)
GLUCOSE SERPL-MCNC: 95 MG/DL — SIGNIFICANT CHANGE UP (ref 70–99)
HCT VFR BLD CALC: 33.8 % — LOW (ref 34.5–45)
HGB BLD-MCNC: 10.8 G/DL — LOW (ref 11.5–15.5)
LYMPHOCYTES # BLD AUTO: 0.62 K/UL — LOW (ref 1–3.3)
LYMPHOCYTES # BLD AUTO: 25 % — SIGNIFICANT CHANGE UP (ref 13–44)
MAGNESIUM SERPL-MCNC: 2.2 MG/DL — SIGNIFICANT CHANGE UP (ref 1.6–2.6)
MCHC RBC-ENTMCNC: 29.7 PG — SIGNIFICANT CHANGE UP (ref 27–34)
MCHC RBC-ENTMCNC: 32 G/DL — SIGNIFICANT CHANGE UP (ref 32–36)
MCV RBC AUTO: 92.9 FL — SIGNIFICANT CHANGE UP (ref 80–100)
MONOCYTES # BLD AUTO: 0.3 K/UL — SIGNIFICANT CHANGE UP (ref 0–0.9)
MONOCYTES NFR BLD AUTO: 12 % — SIGNIFICANT CHANGE UP (ref 2–14)
NEUTROPHILS # BLD AUTO: 1.41 K/UL — LOW (ref 1.8–7.4)
NEUTROPHILS NFR BLD AUTO: 57 % — SIGNIFICANT CHANGE UP (ref 43–77)
NRBC # BLD: 0 /100 — SIGNIFICANT CHANGE UP (ref 0–0)
NRBC # BLD: SIGNIFICANT CHANGE UP /100 WBCS (ref 0–0)
PLAT MORPH BLD: NORMAL — SIGNIFICANT CHANGE UP
PLATELET # BLD AUTO: 121 K/UL — LOW (ref 150–400)
POTASSIUM SERPL-MCNC: 4.4 MMOL/L — SIGNIFICANT CHANGE UP (ref 3.5–5.3)
POTASSIUM SERPL-SCNC: 4.4 MMOL/L — SIGNIFICANT CHANGE UP (ref 3.5–5.3)
PROT SERPL-MCNC: 6.6 G/DL — SIGNIFICANT CHANGE UP (ref 6–8.3)
RBC # BLD: 3.64 M/UL — LOW (ref 3.8–5.2)
RBC # FLD: 13.1 % — SIGNIFICANT CHANGE UP (ref 10.3–14.5)
RBC BLD AUTO: SIGNIFICANT CHANGE UP
SODIUM SERPL-SCNC: 142 MMOL/L — SIGNIFICANT CHANGE UP (ref 135–145)
WBC # BLD: 2.48 K/UL — LOW (ref 3.8–10.5)
WBC # FLD AUTO: 2.48 K/UL — LOW (ref 3.8–10.5)

## 2022-07-25 PROCEDURE — 99213 OFFICE O/P EST LOW 20 MIN: CPT

## 2022-07-26 DIAGNOSIS — C51.1 MALIGNANT NEOPLASM OF LABIUM MINUS: ICD-10-CM

## 2022-08-02 NOTE — RESULTS/DATA
[FreeTextEntry1] : 3/24/22 CT c/a/p\par \par FINDINGS:\par CHEST:\par LUNGS AND LARGE AIRWAYS: Patent central airways. No pulmonary nodules.\par PLEURA: No pleural effusion.\par VESSELS: Atherosclerotic changes of the aorta and coronary arteries.\par HEART: Heart size is normal. No pericardial effusion.\par MEDIASTINUM AND GETACHEW: No lymphadenopathy.\par CHEST WALL AND LOWER NECK: Right chest wall port with catheter tip in the SVC. Large heterogeneous left thyroid mass measuring up to at least 5.6 cm with substernal component and tracheal deviation. Small rim calcified right thyroid nodule is unchanged.\par \par ABDOMEN AND PELVIS:\par LIVER: Within normal limits.\par BILE DUCTS: Normal caliber.\par GALLBLADDER: Cholelithiasis.\par SPLEEN: Within normal limits.\par PANCREAS: Fatty changes.\par ADRENALS: Within normal limits.\par KIDNEYS/URETERS: Within normal limits.\par \par BLADDER: Underdistended.\par REPRODUCTIVE ORGANS: Hysterectomy. No adnexal mass.\par \par BOWEL: Right hemicolectomy. No bowel obstruction.\par PERITONEUM: Trace pelvic ascites, unchanged.\par VESSELS: Chronic thrombosis of the main portal vein with cavernous transformation. The SMV is now patent with residual wall thickening and surrounding inflammatory change. Resolution of right gonadal vein thrombus. Atherosclerotic calcifications.\par RETROPERITONEUM/LYMPH NODES: No lymphadenopathy.\par ABDOMINAL WALL: Small fat-containing umbilical hernia contains a portion of nonobstructed small bowel.\par BONES: Degenerative changes.\par \par IMPRESSION:\par No evidence of adenopathy. 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

## 2022-08-02 NOTE — HISTORY OF PRESENT ILLNESS
[Disease: _____________________] : Disease: [unfilled] [AJCC Stage: ____] : AJCC Stage: [unfilled] [de-identified] : Referred by Dr. Cox \par \par Ms. 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 In addition, the patient is s/p micki-colectomy by Dr. Reaves and found to have stage I colon cancer.\par \par The patient reports that she felt well until January 25th 2021 when she had an episode of vaginal bleeding. She has never had post-menopausal bleeding prior to this episode. She presented to local urgent care and subsequently underwent further workup at [facility] Her oncologic workup is summarized below:\par \par 2/4/21 pelvic US with markedly enlarged uterus, d/t fibroids 13 x 8 x 9 cm\par Endometrial lining 24mm, b/l ovaries not visualized\par \par 3/27/21 pelvic MRI with external iliac LAD measuring 1.7x1.7, with a 9x6mm LN, 13cm fibroid uterus\par \par 4/29/21 D&C\par  EMC- endometrial adenocarcinoma, high grade predominantly c/w serous carcinom and focal areas concerning for clear cell carcinoma\par \par 5/15/21 CT CAP: large left thyroid nodule mildly narrowing the trachea, partially cystic/solid, calcified uterine myomas 5cm, ileocecal intussusception with 5.8cm soft tissue mass lead point which may represent tumor, and para aortic & pelvic LAD, 1.6cm distal left PA LN, 1.7cm left common iliac LN\par \par She was subseuqently referred to gyn onc evaluation and saw Dr. Cox. On 5/20/21, she underwent RASHEEDA/BSO staging surgery which revealed grade 3 clear cell endometrial carcinoma, 40% myometrial invasion, CSI+, LVI+, with ITCs in the right pelvic LN, 1.5cm tumor in left obturator LN, 4/4 left common iliac LNs. No PA ally sampling. Pelvic wash was negative. Final surgical stage [id number], Stage IIIC1. \par \par In addition, she underwent hemicolectomy with Dr. Osiel Reaves on 5/20/21.\par Surgical pathology: right colectomy specimen shows 4 mm invasive moderately-differentiated adenocarcinoma arising from a large tubulovillous adenoma. Staging I T1N0 (0/12 nodes). MMR intact protein expression. \par \par She has been referred to medical oncology for consideration of adjuvant chemotherapy for her endometrial cancer. She feels well overall and has been recovering from her surgery without significant issues. She is eating and drinking without n/v. She is having regular BMs. She denies fevers, chills, n/v, abdominal pain, neuropathy, vaginal discharge or bleeding, urinary symptoms, cough, CP, SOB.\par \par \par PMHx: DM, HTN, hypothyroid \par Meds: metformin, levothyroxine, lipitor, norvasc\par \par Family Hx of Cancer: n/a\par SH: Denies smoking or alcohol use; \par lives with daughter Carin, , has 4 adult children\par \par HM\par Pap- n/a \par Mammo- n/a\par Colonoscopy- 5/16/21 [de-identified] : stage I colon cancer. [FreeTextEntry1] : surgery on 5/20/21 --> adjuvant Carbo/Taxol June 28, 2021 s/p cycle 6/6 on 10/11/2021--> EBRT 25 tx 12/1/21 - 1/26/2022 [de-identified] : Ximena is here for follow up while on surveillance. In the interim she saw Dr. Trejo with stable pelvic exam. \par She feels very well overall with good appetite and energy. She does have persistent residual neuropathy of her feet, but this has been stable and not affecting with her walking or balance. Otherwise regular BMs and no other concerning symptoms. She denies any mouth sores, CP, palpitations, cough, JOHN, n/v/d/c, abdominal pain, LE edema, vaginal discharge or bleeding, urinary symptoms, excessive bruising, dizziness, headaches, arthralgias, myalgias, weight/appetite changes and tries to keeps active. \par \par \par \par \par \par

## 2022-08-02 NOTE — REVIEW OF SYSTEMS
[Negative] : Allergic/Immunologic [Recent Change In Weight] : ~T no recent weight change [Lower Ext Edema] : no lower extremity edema [SOB on Exertion] : no shortness of breath during exertion [Diarrhea] : no diarrhea [FreeTextEntry9] : foot cramping [de-identified] : neuropathy

## 2022-08-03 ENCOUNTER — RESULT REVIEW (OUTPATIENT)
Age: 82
End: 2022-08-03

## 2022-08-05 ENCOUNTER — OUTPATIENT (OUTPATIENT)
Dept: OUTPATIENT SERVICES | Facility: HOSPITAL | Age: 82
LOS: 1 days | End: 2022-08-05
Payer: COMMERCIAL

## 2022-08-05 ENCOUNTER — APPOINTMENT (OUTPATIENT)
Dept: CT IMAGING | Facility: IMAGING CENTER | Age: 82
End: 2022-08-05

## 2022-08-05 ENCOUNTER — RESULT REVIEW (OUTPATIENT)
Age: 82
End: 2022-08-05

## 2022-08-05 ENCOUNTER — APPOINTMENT (OUTPATIENT)
Dept: MAMMOGRAPHY | Facility: IMAGING CENTER | Age: 82
End: 2022-08-05

## 2022-08-05 DIAGNOSIS — Z98.51 TUBAL LIGATION STATUS: Chronic | ICD-10-CM

## 2022-08-05 DIAGNOSIS — Z98.890 OTHER SPECIFIED POSTPROCEDURAL STATES: Chronic | ICD-10-CM

## 2022-08-05 DIAGNOSIS — C54.1 MALIGNANT NEOPLASM OF ENDOMETRIUM: ICD-10-CM

## 2022-08-05 PROCEDURE — 77067 SCR MAMMO BI INCL CAD: CPT

## 2022-08-05 PROCEDURE — 77067 SCR MAMMO BI INCL CAD: CPT | Mod: 26

## 2022-08-05 PROCEDURE — 74177 CT ABD & PELVIS W/CONTRAST: CPT

## 2022-08-05 PROCEDURE — 77063 BREAST TOMOSYNTHESIS BI: CPT | Mod: 26

## 2022-08-05 PROCEDURE — 74177 CT ABD & PELVIS W/CONTRAST: CPT | Mod: 26

## 2022-08-05 PROCEDURE — 77063 BREAST TOMOSYNTHESIS BI: CPT

## 2022-09-21 ENCOUNTER — OUTPATIENT (OUTPATIENT)
Dept: OUTPATIENT SERVICES | Facility: HOSPITAL | Age: 82
LOS: 1 days | Discharge: HOME | End: 2022-09-21

## 2022-09-21 DIAGNOSIS — D64.9 ANEMIA, UNSPECIFIED: ICD-10-CM

## 2022-09-21 DIAGNOSIS — Z98.890 OTHER SPECIFIED POSTPROCEDURAL STATES: Chronic | ICD-10-CM

## 2022-09-21 DIAGNOSIS — Z98.51 TUBAL LIGATION STATUS: Chronic | ICD-10-CM

## 2022-09-26 ENCOUNTER — RESULT REVIEW (OUTPATIENT)
Age: 82
End: 2022-09-26

## 2022-09-26 ENCOUNTER — APPOINTMENT (OUTPATIENT)
Dept: INFUSION THERAPY | Facility: HOSPITAL | Age: 82
End: 2022-09-26

## 2022-09-26 LAB
ALBUMIN SERPL ELPH-MCNC: 4.1 G/DL — SIGNIFICANT CHANGE UP (ref 3.3–5)
ALP SERPL-CCNC: 113 U/L — SIGNIFICANT CHANGE UP (ref 40–120)
ALT FLD-CCNC: 18 U/L — SIGNIFICANT CHANGE UP (ref 10–45)
ANION GAP SERPL CALC-SCNC: 11 MMOL/L — SIGNIFICANT CHANGE UP (ref 5–17)
AST SERPL-CCNC: 23 U/L — SIGNIFICANT CHANGE UP (ref 10–40)
BILIRUB SERPL-MCNC: 0.5 MG/DL — SIGNIFICANT CHANGE UP (ref 0.2–1.2)
BUN SERPL-MCNC: 20 MG/DL — SIGNIFICANT CHANGE UP (ref 7–23)
CALCIUM SERPL-MCNC: 10 MG/DL — SIGNIFICANT CHANGE UP (ref 8.4–10.5)
CANCER AG125 SERPL-ACNC: 4 U/ML — SIGNIFICANT CHANGE UP
CHLORIDE SERPL-SCNC: 106 MMOL/L — SIGNIFICANT CHANGE UP (ref 96–108)
CO2 SERPL-SCNC: 24 MMOL/L — SIGNIFICANT CHANGE UP (ref 22–31)
CREAT SERPL-MCNC: 1.14 MG/DL — SIGNIFICANT CHANGE UP (ref 0.5–1.3)
EGFR: 48 ML/MIN/1.73M2 — LOW
GLUCOSE SERPL-MCNC: 120 MG/DL — HIGH (ref 70–99)
HCT VFR BLD CALC: 34.8 % — SIGNIFICANT CHANGE UP (ref 34.5–45)
HGB BLD-MCNC: 11.1 G/DL — LOW (ref 11.5–15.5)
MAGNESIUM SERPL-MCNC: 2 MG/DL — SIGNIFICANT CHANGE UP (ref 1.6–2.6)
MCHC RBC-ENTMCNC: 29.8 PG — SIGNIFICANT CHANGE UP (ref 27–34)
MCHC RBC-ENTMCNC: 31.9 G/DL — LOW (ref 32–36)
MCV RBC AUTO: 93.3 FL — SIGNIFICANT CHANGE UP (ref 80–100)
PLATELET # BLD AUTO: 127 K/UL — LOW (ref 150–400)
POTASSIUM SERPL-MCNC: 4.8 MMOL/L — SIGNIFICANT CHANGE UP (ref 3.5–5.3)
POTASSIUM SERPL-SCNC: 4.8 MMOL/L — SIGNIFICANT CHANGE UP (ref 3.5–5.3)
PROT SERPL-MCNC: 6.4 G/DL — SIGNIFICANT CHANGE UP (ref 6–8.3)
RBC # BLD: 3.73 M/UL — LOW (ref 3.8–5.2)
RBC # FLD: 13.2 % — SIGNIFICANT CHANGE UP (ref 10.3–14.5)
SODIUM SERPL-SCNC: 141 MMOL/L — SIGNIFICANT CHANGE UP (ref 135–145)
WBC # BLD: 3.24 K/UL — LOW (ref 3.8–10.5)
WBC # FLD AUTO: 3.24 K/UL — LOW (ref 3.8–10.5)

## 2022-10-03 ENCOUNTER — RESULT REVIEW (OUTPATIENT)
Age: 82
End: 2022-10-03

## 2022-10-03 ENCOUNTER — APPOINTMENT (OUTPATIENT)
Dept: HEMATOLOGY ONCOLOGY | Facility: CLINIC | Age: 82
End: 2022-10-03

## 2022-10-03 ENCOUNTER — NON-APPOINTMENT (OUTPATIENT)
Age: 82
End: 2022-10-03

## 2022-10-03 VITALS
WEIGHT: 156.97 LBS | SYSTOLIC BLOOD PRESSURE: 208 MMHG | RESPIRATION RATE: 16 BRPM | HEIGHT: 66.93 IN | DIASTOLIC BLOOD PRESSURE: 83 MMHG | TEMPERATURE: 97.2 F | HEART RATE: 48 BPM | BODY MASS INDEX: 24.64 KG/M2 | OXYGEN SATURATION: 99 %

## 2022-10-03 VITALS — SYSTOLIC BLOOD PRESSURE: 188 MMHG | DIASTOLIC BLOOD PRESSURE: 95 MMHG

## 2022-10-03 LAB
BASOPHILS # BLD AUTO: 0.01 K/UL — SIGNIFICANT CHANGE UP (ref 0–0.2)
BASOPHILS NFR BLD AUTO: 0.3 % — SIGNIFICANT CHANGE UP (ref 0–2)
EOSINOPHIL # BLD AUTO: 0.08 K/UL — SIGNIFICANT CHANGE UP (ref 0–0.5)
EOSINOPHIL NFR BLD AUTO: 2.6 % — SIGNIFICANT CHANGE UP (ref 0–6)
HCT VFR BLD CALC: 35.7 % — SIGNIFICANT CHANGE UP (ref 34.5–45)
HGB BLD-MCNC: 11.7 G/DL — SIGNIFICANT CHANGE UP (ref 11.5–15.5)
IMM GRANULOCYTES NFR BLD AUTO: 0 % — SIGNIFICANT CHANGE UP (ref 0–0.9)
LYMPHOCYTES # BLD AUTO: 0.99 K/UL — LOW (ref 1–3.3)
LYMPHOCYTES # BLD AUTO: 31.9 % — SIGNIFICANT CHANGE UP (ref 13–44)
MCHC RBC-ENTMCNC: 30.2 PG — SIGNIFICANT CHANGE UP (ref 27–34)
MCHC RBC-ENTMCNC: 32.8 G/DL — SIGNIFICANT CHANGE UP (ref 32–36)
MCV RBC AUTO: 92.2 FL — SIGNIFICANT CHANGE UP (ref 80–100)
MONOCYTES # BLD AUTO: 0.34 K/UL — SIGNIFICANT CHANGE UP (ref 0–0.9)
MONOCYTES NFR BLD AUTO: 11 % — SIGNIFICANT CHANGE UP (ref 2–14)
NEUTROPHILS # BLD AUTO: 1.68 K/UL — LOW (ref 1.8–7.4)
NEUTROPHILS NFR BLD AUTO: 54.2 % — SIGNIFICANT CHANGE UP (ref 43–77)
NRBC # BLD: 0 /100 WBCS — SIGNIFICANT CHANGE UP (ref 0–0)
PLATELET # BLD AUTO: 138 K/UL — LOW (ref 150–400)
RBC # BLD: 3.87 M/UL — SIGNIFICANT CHANGE UP (ref 3.8–5.2)
RBC # FLD: 13 % — SIGNIFICANT CHANGE UP (ref 10.3–14.5)
WBC # BLD: 3.1 K/UL — LOW (ref 3.8–10.5)
WBC # FLD AUTO: 3.1 K/UL — LOW (ref 3.8–10.5)

## 2022-10-03 PROCEDURE — 99213 OFFICE O/P EST LOW 20 MIN: CPT

## 2022-10-03 RX ORDER — CLOBETASOL PROPIONATE 0.5 MG/G
0.05 CREAM TOPICAL TWICE DAILY
Qty: 1 | Refills: 1 | Status: ACTIVE | COMMUNITY
Start: 2022-10-03 | End: 1900-01-01

## 2022-10-04 LAB
INR PPP: 1.08 RATIO
PT BLD: 12.6 SEC

## 2022-10-27 ENCOUNTER — APPOINTMENT (OUTPATIENT)
Dept: HEMATOLOGY ONCOLOGY | Facility: CLINIC | Age: 82
End: 2022-10-27

## 2022-11-01 NOTE — REVIEW OF SYSTEMS
[Negative] : Allergic/Immunologic [Recent Change In Weight] : ~T no recent weight change [Lower Ext Edema] : no lower extremity edema [SOB on Exertion] : no shortness of breath during exertion [Diarrhea] : no diarrhea [FreeTextEntry9] : foot cramping [de-identified] : neuropathy

## 2022-11-01 NOTE — HISTORY OF PRESENT ILLNESS
[Disease: _____________________] : Disease: [unfilled] [AJCC Stage: ____] : AJCC Stage: [unfilled] [de-identified] : Referred by Dr. Cox \par \par Ms. 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 In addition, the patient is s/p micki-colectomy by Dr. Reaves and found to have stage I colon cancer.\par \par The patient reports that she felt well until January 25th 2021 when she had an episode of vaginal bleeding. She has never had post-menopausal bleeding prior to this episode. She presented to local urgent care and subsequently underwent further workup at [facility]. Her oncologic workup is summarized below:\par \par 2/4/21 pelvic US with markedly enlarged uterus, d/t fibroids 13 x 8 x 9 cm\par Endometrial lining 24mm, b/l ovaries not visualized\par \par 3/27/21 pelvic MRI with external iliac LAD measuring 1.7x1.7, with a 9x6mm LN, 13cm fibroid uterus\par \par 4/29/21 D&C\par  EMC- endometrial adenocarcinoma, high grade predominantly c/w serous carcinom and focal areas concerning for clear cell carcinoma\par \par 5/15/21 CT CAP: large left thyroid nodule mildly narrowing the trachea, partially cystic/solid, calcified uterine myomas 5cm, ileocecal intussusception with 5.8cm soft tissue mass lead point which may represent tumor, and para aortic & pelvic LAD, 1.6cm distal left PA LN, 1.7cm left common iliac LN\par \par She was subseuqently referred to gyn onc evaluation and saw Dr. Cox. On 5/20/21, she underwent RASHEEDA/BSO staging surgery which revealed grade 3 clear cell endometrial carcinoma, 40% myometrial invasion, CSI+, LVI+, with ITCs in the right pelvic LN, 1.5cm tumor in left obturator LN, 4/4 left common iliac LNs. No PA ally sampling. Pelvic wash was negative. Final surgical stage [id number], Stage IIIC1. \par \par In addition, she underwent hemicolectomy with Dr. Osiel Reaves on 5/20/21.\par Surgical pathology: right colectomy specimen shows 4 mm invasive moderately-differentiated adenocarcinoma arising from a large tubulovillous adenoma. Staging I T1N0 (0/12 nodes). MMR intact protein expression. \par \par She has been referred to medical oncology for consideration of adjuvant chemotherapy for her endometrial cancer. She feels well overall and has been recovering from her surgery without significant issues. She is eating and drinking without n/v. She is having regular BMs. She denies fevers, chills, n/v, abdominal pain, neuropathy, vaginal discharge or bleeding, urinary symptoms, cough, CP, SOB.\par \par \par PMHx: DM, HTN, hypothyroid \par Meds: metformin, levothyroxine, lipitor, norvasc\par \par Family Hx of Cancer: n/a\par SH: Denies smoking or alcohol use; \par lives with daughter Carin, , has 4 adult children\par \par HM\par Pap- n/a \par Mammo- n/a\par Colonoscopy- 5/16/21 [de-identified] : stage I colon cancer. [FreeTextEntry1] : surgery on 5/20/21 --> adjuvant Carbo/Taxol June 28, 2021 s/p cycle 6/6 on 10/11/2021--> EBRT 25 tx 12/1/21 - 1/26/2022 [de-identified] : Ximena is here for follow up while on surveillance. In the interim she underwent CT scans in August 2022 that showed DEVON.\par She feels well overall with good appetite and energy. She has stable residual neuropathy of her feet. She remains active and plans to travel to her home country.  \par She has regular BMs and has been eating/drinking without n/v. She denies any mouth sores, CP, palpitations, cough, JOHN, n/v/d/c, abdominal pain, LE edema, vaginal discharge or bleeding, urinary symptoms, excessive bruising, dizziness, headaches, arthralgias, myalgias, weight/appetite changes and tries to keeps active. \par \par \par \par \par \par

## 2022-11-22 RX ORDER — GABAPENTIN 300 MG/1
300 CAPSULE ORAL
Qty: 90 | Refills: 1 | Status: ACTIVE | COMMUNITY
Start: 2021-07-23 | End: 1900-01-01

## 2022-12-02 ENCOUNTER — APPOINTMENT (OUTPATIENT)
Dept: GYNECOLOGIC ONCOLOGY | Facility: CLINIC | Age: 82
End: 2022-12-02

## 2023-01-26 ENCOUNTER — OUTPATIENT (OUTPATIENT)
Dept: OUTPATIENT SERVICES | Facility: HOSPITAL | Age: 83
LOS: 1 days | Discharge: HOME | End: 2023-01-26

## 2023-01-26 DIAGNOSIS — Z98.51 TUBAL LIGATION STATUS: Chronic | ICD-10-CM

## 2023-01-26 DIAGNOSIS — Z98.890 OTHER SPECIFIED POSTPROCEDURAL STATES: Chronic | ICD-10-CM

## 2023-01-26 DIAGNOSIS — D64.9 ANEMIA, UNSPECIFIED: ICD-10-CM

## 2023-02-02 ENCOUNTER — APPOINTMENT (OUTPATIENT)
Dept: HEMATOLOGY ONCOLOGY | Facility: CLINIC | Age: 83
End: 2023-02-02

## 2023-02-02 ENCOUNTER — APPOINTMENT (OUTPATIENT)
Dept: INFUSION THERAPY | Facility: HOSPITAL | Age: 83
End: 2023-02-02

## 2023-03-21 ENCOUNTER — APPOINTMENT (OUTPATIENT)
Dept: SURGICAL ONCOLOGY | Facility: CLINIC | Age: 83
End: 2023-03-21

## 2023-03-27 ENCOUNTER — APPOINTMENT (OUTPATIENT)
Dept: HEMATOLOGY ONCOLOGY | Facility: CLINIC | Age: 83
End: 2023-03-27
Payer: MEDICARE

## 2023-03-27 ENCOUNTER — RESULT REVIEW (OUTPATIENT)
Age: 83
End: 2023-03-27

## 2023-03-27 ENCOUNTER — APPOINTMENT (OUTPATIENT)
Dept: HEMATOLOGY ONCOLOGY | Facility: CLINIC | Age: 83
End: 2023-03-27

## 2023-03-27 VITALS
DIASTOLIC BLOOD PRESSURE: 74 MMHG | HEART RATE: 45 BPM | SYSTOLIC BLOOD PRESSURE: 152 MMHG | OXYGEN SATURATION: 99 % | TEMPERATURE: 97.3 F | HEIGHT: 66.93 IN | BODY MASS INDEX: 25.98 KG/M2 | WEIGHT: 165.55 LBS | RESPIRATION RATE: 17 BRPM

## 2023-03-27 LAB
BASOPHILS # BLD AUTO: 0.01 K/UL — SIGNIFICANT CHANGE UP (ref 0–0.2)
BASOPHILS NFR BLD AUTO: 0.3 % — SIGNIFICANT CHANGE UP (ref 0–2)
EOSINOPHIL # BLD AUTO: 0.09 K/UL — SIGNIFICANT CHANGE UP (ref 0–0.5)
EOSINOPHIL NFR BLD AUTO: 2.6 % — SIGNIFICANT CHANGE UP (ref 0–6)
HCT VFR BLD CALC: 36.3 % — SIGNIFICANT CHANGE UP (ref 34.5–45)
HGB BLD-MCNC: 11.4 G/DL — LOW (ref 11.5–15.5)
IMM GRANULOCYTES NFR BLD AUTO: 0.3 % — SIGNIFICANT CHANGE UP (ref 0–0.9)
LYMPHOCYTES # BLD AUTO: 1 K/UL — SIGNIFICANT CHANGE UP (ref 1–3.3)
LYMPHOCYTES # BLD AUTO: 29.3 % — SIGNIFICANT CHANGE UP (ref 13–44)
MCHC RBC-ENTMCNC: 29.2 PG — SIGNIFICANT CHANGE UP (ref 27–34)
MCHC RBC-ENTMCNC: 31.4 G/DL — LOW (ref 32–36)
MCV RBC AUTO: 93.1 FL — SIGNIFICANT CHANGE UP (ref 80–100)
MONOCYTES # BLD AUTO: 0.39 K/UL — SIGNIFICANT CHANGE UP (ref 0–0.9)
MONOCYTES NFR BLD AUTO: 11.4 % — SIGNIFICANT CHANGE UP (ref 2–14)
NEUTROPHILS # BLD AUTO: 1.91 K/UL — SIGNIFICANT CHANGE UP (ref 1.8–7.4)
NEUTROPHILS NFR BLD AUTO: 56.1 % — SIGNIFICANT CHANGE UP (ref 43–77)
NRBC # BLD: 0 /100 WBCS — SIGNIFICANT CHANGE UP (ref 0–0)
PLATELET # BLD AUTO: 134 K/UL — LOW (ref 150–400)
RBC # BLD: 3.9 M/UL — SIGNIFICANT CHANGE UP (ref 3.8–5.2)
RBC # FLD: 13.5 % — SIGNIFICANT CHANGE UP (ref 10.3–14.5)
WBC # BLD: 3.41 K/UL — LOW (ref 3.8–10.5)
WBC # FLD AUTO: 3.41 K/UL — LOW (ref 3.8–10.5)

## 2023-03-27 PROCEDURE — 99213 OFFICE O/P EST LOW 20 MIN: CPT

## 2023-03-29 LAB
ALBUMIN SERPL ELPH-MCNC: 4.2 G/DL
ALP BLD-CCNC: 113 U/L
ALT SERPL-CCNC: 13 U/L
ANION GAP SERPL CALC-SCNC: 11 MMOL/L
AST SERPL-CCNC: 18 U/L
BILIRUB SERPL-MCNC: 0.7 MG/DL
BUN SERPL-MCNC: 15 MG/DL
CALCIUM SERPL-MCNC: 10.5 MG/DL
CANCER AG125 SERPL-ACNC: 4 U/ML
CHLORIDE SERPL-SCNC: 105 MMOL/L
CO2 SERPL-SCNC: 26 MMOL/L
CREAT SERPL-MCNC: 1.2 MG/DL
EGFR: 45 ML/MIN/1.73M2
GLUCOSE SERPL-MCNC: 91 MG/DL
POTASSIUM SERPL-SCNC: 4.3 MMOL/L
PROT SERPL-MCNC: 6.6 G/DL
SODIUM SERPL-SCNC: 142 MMOL/L

## 2023-03-30 ENCOUNTER — RESULT REVIEW (OUTPATIENT)
Age: 83
End: 2023-03-30

## 2023-04-01 NOTE — REVIEW OF SYSTEMS
[Negative] : Allergic/Immunologic [Recent Change In Weight] : ~T no recent weight change [Lower Ext Edema] : no lower extremity edema [SOB on Exertion] : no shortness of breath during exertion [Diarrhea] : no diarrhea [FreeTextEntry9] : foot cramping [de-identified] : neuropathy

## 2023-04-01 NOTE — HISTORY OF PRESENT ILLNESS
[Disease: _____________________] : Disease: [unfilled] [AJCC Stage: ____] : AJCC Stage: [unfilled] [de-identified] : Referred by Dr. Cox \par \par Ms. 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 In addition, the patient is s/p micki-colectomy by Dr. Reaves and found to have stage I colon cancer.\par \par The patient reports that she felt well until January 25th 2021 when she had an episode of vaginal bleeding. She has never had post-menopausal bleeding prior to this episode. She presented to local urgent care and subsequently underwent further workup at [facility]. Her oncologic workup is summarized below:\par \par 2/4/21 pelvic US with markedly enlarged uterus, d/t fibroids 13 x 8 x 9 cm\par Endometrial lining 24mm, b/l ovaries not visualized\par \par 3/27/21 pelvic MRI with external iliac LAD measuring 1.7x1.7, with a 9x6mm LN, 13cm fibroid uterus\par \par 4/29/21 D&C\par  EMC- endometrial adenocarcinoma, high grade predominantly c/w serous carcinom and focal areas concerning for clear cell carcinoma\par \par 5/15/21 CT CAP: large left thyroid nodule mildly narrowing the trachea, partially cystic/solid, calcified uterine myomas 5cm, ileocecal intussusception with 5.8cm soft tissue mass lead point which may represent tumor, and para aortic & pelvic LAD, 1.6cm distal left PA LN, 1.7cm left common iliac LN\par \par She was subseuqently referred to gyn onc evaluation and saw Dr. Cox. On 5/20/21, she underwent RASHEEDA/BSO staging surgery which revealed grade 3 clear cell endometrial carcinoma, 40% myometrial invasion, CSI+, LVI+, with ITCs in the right pelvic LN, 1.5cm tumor in left obturator LN, 4/4 left common iliac LNs. No PA ally sampling. Pelvic wash was negative. Final surgical stage [id number], Stage IIIC1. \par \par In addition, she underwent hemicolectomy with Dr. Osiel Reaves on 5/20/21.\par Surgical pathology: right colectomy specimen shows 4 mm invasive moderately-differentiated adenocarcinoma arising from a large tubulovillous adenoma. Staging I T1N0 (0/12 nodes). MMR intact protein expression. \par \par She has been referred to medical oncology for consideration of adjuvant chemotherapy for her endometrial cancer. She feels well overall and has been recovering from her surgery without significant issues. She is eating and drinking without n/v. She is having regular BMs. She denies fevers, chills, n/v, abdominal pain, neuropathy, vaginal discharge or bleeding, urinary symptoms, cough, CP, SOB.\par \par \par PMHx: DM, HTN, hypothyroid \par Meds: metformin, levothyroxine, lipitor, norvasc\par \par Family Hx of Cancer: n/a\par SH: Denies smoking or alcohol use; \par lives with daughter Carin, , has 4 adult children\par \par HM\par Pap- n/a \par Mammo- n/a\par Colonoscopy- 5/16/21 [de-identified] : stage I colon cancer. [FreeTextEntry1] : surgery on 5/20/21 --> adjuvant Carbo/Taxol June 28, 2021 s/p cycle 6/6 on 10/11/2021--> EBRT 25 tx 12/1/21 - 1/26/2022 [de-identified] : Ximena is here for follow up while on surveillance. She just arrived back from [place], was there the last 3 months. She has been feeling well, staying active. Reports good appetite, eating and drinking well with regular bowel movements. Denies fever, chills, mouth sores, CP, palpitations, cough, JOHN, n/v/d/c, abdominal pain, LE edema, vaginal discharge or bleeding, urinary symptoms, excessive bruising, dizziness, headaches, arthralgias, myalgias. \par \par \par \par \par \par

## 2023-04-04 ENCOUNTER — APPOINTMENT (OUTPATIENT)
Dept: CT IMAGING | Facility: IMAGING CENTER | Age: 83
End: 2023-04-04
Payer: MEDICARE

## 2023-04-04 ENCOUNTER — OUTPATIENT (OUTPATIENT)
Dept: OUTPATIENT SERVICES | Facility: HOSPITAL | Age: 83
LOS: 1 days | End: 2023-04-04
Payer: COMMERCIAL

## 2023-04-04 DIAGNOSIS — Z98.51 TUBAL LIGATION STATUS: Chronic | ICD-10-CM

## 2023-04-04 DIAGNOSIS — C54.1 MALIGNANT NEOPLASM OF ENDOMETRIUM: ICD-10-CM

## 2023-04-04 PROCEDURE — 74177 CT ABD & PELVIS W/CONTRAST: CPT | Mod: 26

## 2023-04-04 PROCEDURE — 74177 CT ABD & PELVIS W/CONTRAST: CPT

## 2023-04-25 ENCOUNTER — NON-APPOINTMENT (OUTPATIENT)
Age: 83
End: 2023-04-25

## 2023-04-27 ENCOUNTER — OUTPATIENT (OUTPATIENT)
Dept: OUTPATIENT SERVICES | Facility: HOSPITAL | Age: 83
LOS: 1 days | Discharge: HOME | End: 2023-04-27

## 2023-04-27 DIAGNOSIS — Z98.890 OTHER SPECIFIED POSTPROCEDURAL STATES: Chronic | ICD-10-CM

## 2023-04-27 DIAGNOSIS — D64.9 ANEMIA, UNSPECIFIED: ICD-10-CM

## 2023-04-27 DIAGNOSIS — Z98.51 TUBAL LIGATION STATUS: Chronic | ICD-10-CM

## 2023-05-01 ENCOUNTER — RESULT REVIEW (OUTPATIENT)
Age: 83
End: 2023-05-01

## 2023-05-01 ENCOUNTER — NON-APPOINTMENT (OUTPATIENT)
Age: 83
End: 2023-05-01

## 2023-05-01 ENCOUNTER — APPOINTMENT (OUTPATIENT)
Dept: HEMATOLOGY ONCOLOGY | Facility: CLINIC | Age: 83
End: 2023-05-01

## 2023-05-01 ENCOUNTER — APPOINTMENT (OUTPATIENT)
Dept: GYNECOLOGIC ONCOLOGY | Facility: CLINIC | Age: 83
End: 2023-05-01
Payer: MEDICARE

## 2023-05-01 VITALS
BODY MASS INDEX: 27 KG/M2 | WEIGHT: 168 LBS | SYSTOLIC BLOOD PRESSURE: 144 MMHG | HEIGHT: 66 IN | HEART RATE: 59 BPM | DIASTOLIC BLOOD PRESSURE: 95 MMHG

## 2023-05-01 LAB
BASOPHILS # BLD AUTO: 0.01 K/UL — SIGNIFICANT CHANGE UP (ref 0–0.2)
BASOPHILS NFR BLD AUTO: 0.3 % — SIGNIFICANT CHANGE UP (ref 0–2)
EOSINOPHIL # BLD AUTO: 0.09 K/UL — SIGNIFICANT CHANGE UP (ref 0–0.5)
EOSINOPHIL NFR BLD AUTO: 2.8 % — SIGNIFICANT CHANGE UP (ref 0–6)
HCT VFR BLD CALC: 36.5 % — SIGNIFICANT CHANGE UP (ref 34.5–45)
HGB BLD-MCNC: 11.4 G/DL — LOW (ref 11.5–15.5)
IMM GRANULOCYTES NFR BLD AUTO: 0 % — SIGNIFICANT CHANGE UP (ref 0–0.9)
LYMPHOCYTES # BLD AUTO: 0.84 K/UL — LOW (ref 1–3.3)
LYMPHOCYTES # BLD AUTO: 25.8 % — SIGNIFICANT CHANGE UP (ref 13–44)
MCHC RBC-ENTMCNC: 29.5 PG — SIGNIFICANT CHANGE UP (ref 27–34)
MCHC RBC-ENTMCNC: 31.2 G/DL — LOW (ref 32–36)
MCV RBC AUTO: 94.3 FL — SIGNIFICANT CHANGE UP (ref 80–100)
MONOCYTES # BLD AUTO: 0.38 K/UL — SIGNIFICANT CHANGE UP (ref 0–0.9)
MONOCYTES NFR BLD AUTO: 11.7 % — SIGNIFICANT CHANGE UP (ref 2–14)
NEUTROPHILS # BLD AUTO: 1.93 K/UL — SIGNIFICANT CHANGE UP (ref 1.8–7.4)
NEUTROPHILS NFR BLD AUTO: 59.4 % — SIGNIFICANT CHANGE UP (ref 43–77)
NRBC # BLD: 0 /100 WBCS — SIGNIFICANT CHANGE UP (ref 0–0)
PLATELET # BLD AUTO: 131 K/UL — LOW (ref 150–400)
RBC # BLD: 3.87 M/UL — SIGNIFICANT CHANGE UP (ref 3.8–5.2)
RBC # FLD: 13.4 % — SIGNIFICANT CHANGE UP (ref 10.3–14.5)
WBC # BLD: 3.25 K/UL — LOW (ref 3.8–10.5)
WBC # FLD AUTO: 3.25 K/UL — LOW (ref 3.8–10.5)

## 2023-05-01 PROCEDURE — 99213 OFFICE O/P EST LOW 20 MIN: CPT

## 2023-05-01 NOTE — HISTORY OF PRESENT ILLNESS
[FreeTextEntry1] : IIIC clear cell endometrial cancer\par T1N0 colon cancer\par \par Robotic TLH/BSO/PLND 5/20/21 (Brooke)\par Robotic right hemicolectomy 5/20/21 (Jean Paul)\par Carboplatin/paclitaxel x 6 6/28/21-10/11/21\par EBRT pelvis/PA nodes 4500 cGy and VBT 1200 cGy 11/30/21-1/21/22\par \par \par She saw Dr. Jeffries for follow up in 3/2023\par CA-125 in 3/2023 was 4\par CT 4/2023 DEVON\par \par Denies abdominal/pelvic pain, dyspnea or chest pain, vaginal bleeding or discharge, nausea/vomiting, changes in bowel habits or urination, lower extremity edema or pain.\par \par

## 2023-05-01 NOTE — PHYSICAL EXAM
[Chaperone Present] : A chaperone was present in the examining room during all aspects of the physical examination [Absent] : Adnexa(ae): Absent [Normal] : Recto-Vaginal Exam: Normal [de-identified] : healed laparoscopic incisions

## 2023-06-19 ENCOUNTER — RESULT REVIEW (OUTPATIENT)
Age: 83
End: 2023-06-19

## 2023-06-19 ENCOUNTER — APPOINTMENT (OUTPATIENT)
Dept: ENDOVASCULAR SURGERY | Facility: CLINIC | Age: 83
End: 2023-06-19
Payer: MEDICARE

## 2023-06-19 VITALS
BODY MASS INDEX: 27 KG/M2 | TEMPERATURE: 97.3 F | DIASTOLIC BLOOD PRESSURE: 71 MMHG | RESPIRATION RATE: 16 BRPM | OXYGEN SATURATION: 100 % | HEIGHT: 66 IN | WEIGHT: 168 LBS | SYSTOLIC BLOOD PRESSURE: 188 MMHG | HEART RATE: 45 BPM

## 2023-06-19 PROCEDURE — 77001 FLUOROGUIDE FOR VEIN DEVICE: CPT

## 2023-06-19 PROCEDURE — 36590 REMOVAL TUNNELED CV CATH: CPT | Mod: RT

## 2023-06-19 RX ORDER — APIXABAN 5 MG/1
5 TABLET, FILM COATED ORAL
Qty: 60 | Refills: 2 | Status: DISCONTINUED | COMMUNITY
Start: 2022-02-28 | End: 2023-06-19

## 2023-06-19 NOTE — PROCEDURE
[Site check for bleeding/hematoma] : Site check for bleeding/hematoma [Vital signs on admission the q 15 mins x2] : Vital signs on admission the q 15 mins x2 [FreeTextEntry1] : right chest wall mediport removal

## 2023-06-19 NOTE — PAST MEDICAL HISTORY
[Increasing age ( >40 years old)] : Increasing age ( >40 years old) [Malignancy] : Malignancy [No therapy indicated for cases scheduled for less than one hour] : No therapy indicated for cases scheduled for less than one hour. [FreeTextEntry1] : Malignant Hyperthermia Screening Tool and Risk of Bleeding Assessment\par \par Ms. COBY CAIN denies family history of unexpected death following Anesthesia or Exercise.\par Denies family history of Malignant Hyperthermia, Muscle or Neuromuscular disorder and High Temperature following exercise.\par \par Ms. COBY CAIN denies history of Muscle Spasm, Dark or Chocolate - Colored urine and Unanticipated fever immediately following anesthesia or serious exercise.\par Ms. CAIN also denies bleeding tendencies / Risk of Bleeding.\par

## 2023-06-19 NOTE — HISTORY OF PRESENT ILLNESS
[FreeTextEntry1] : accompanied by daughter Carin [phone]\par feels ok\par took BP med this moring [FreeTextEntry5] : yesterday at 10pm [FreeTextEntry6] : Dr. James

## 2023-06-19 NOTE — ASSESSMENT
[FreeTextEntry1] : Endometrial Ca with completion of Rx for port removal.  Consent obtained.\par \par Port removed without incident.  EBL=minimal.  Full report to follow.

## 2023-06-21 ENCOUNTER — OUTPATIENT (OUTPATIENT)
Dept: OUTPATIENT SERVICES | Facility: HOSPITAL | Age: 83
LOS: 1 days | Discharge: HOME | End: 2023-06-21

## 2023-06-21 DIAGNOSIS — D64.9 ANEMIA, UNSPECIFIED: ICD-10-CM

## 2023-06-21 DIAGNOSIS — Z98.51 TUBAL LIGATION STATUS: Chronic | ICD-10-CM

## 2023-06-21 DIAGNOSIS — Z98.890 OTHER SPECIFIED POSTPROCEDURAL STATES: Chronic | ICD-10-CM

## 2023-06-22 ENCOUNTER — APPOINTMENT (OUTPATIENT)
Dept: HEMATOLOGY ONCOLOGY | Facility: CLINIC | Age: 83
End: 2023-06-22

## 2023-07-03 ENCOUNTER — APPOINTMENT (OUTPATIENT)
Dept: HEMATOLOGY ONCOLOGY | Facility: CLINIC | Age: 83
End: 2023-07-03
Payer: MEDICARE

## 2023-07-12 ENCOUNTER — RESULT REVIEW (OUTPATIENT)
Age: 83
End: 2023-07-12

## 2023-07-12 ENCOUNTER — APPOINTMENT (OUTPATIENT)
Dept: HEMATOLOGY ONCOLOGY | Facility: CLINIC | Age: 83
End: 2023-07-12
Payer: MEDICARE

## 2023-07-12 VITALS
RESPIRATION RATE: 16 BRPM | TEMPERATURE: 97 F | SYSTOLIC BLOOD PRESSURE: 169 MMHG | HEART RATE: 52 BPM | WEIGHT: 175.71 LBS | OXYGEN SATURATION: 99 % | DIASTOLIC BLOOD PRESSURE: 72 MMHG | BODY MASS INDEX: 28.36 KG/M2

## 2023-07-12 LAB
ALBUMIN SERPL ELPH-MCNC: 4.2 G/DL
ALP BLD-CCNC: 116 U/L
ALT SERPL-CCNC: 17 U/L
ANION GAP SERPL CALC-SCNC: 10 MMOL/L
AST SERPL-CCNC: 28 U/L
BASOPHILS # BLD AUTO: 0.02 K/UL — SIGNIFICANT CHANGE UP (ref 0–0.2)
BASOPHILS NFR BLD AUTO: 0.4 % — SIGNIFICANT CHANGE UP (ref 0–2)
BILIRUB SERPL-MCNC: 0.6 MG/DL
BUN SERPL-MCNC: 15 MG/DL
CALCIUM SERPL-MCNC: 10.3 MG/DL
CHLORIDE SERPL-SCNC: 105 MMOL/L
CO2 SERPL-SCNC: 26 MMOL/L
CREAT SERPL-MCNC: 1.25 MG/DL
EGFR: 42 ML/MIN/1.73M2
EOSINOPHIL # BLD AUTO: 0.09 K/UL — SIGNIFICANT CHANGE UP (ref 0–0.5)
EOSINOPHIL NFR BLD AUTO: 1.9 % — SIGNIFICANT CHANGE UP (ref 0–6)
GLUCOSE SERPL-MCNC: 78 MG/DL
HCT VFR BLD CALC: 37.6 % — SIGNIFICANT CHANGE UP (ref 34.5–45)
HGB BLD-MCNC: 11.9 G/DL — SIGNIFICANT CHANGE UP (ref 11.5–15.5)
IMM GRANULOCYTES NFR BLD AUTO: 0.2 % — SIGNIFICANT CHANGE UP (ref 0–0.9)
INR PPP: 1.21 RATIO
LYMPHOCYTES # BLD AUTO: 1 K/UL — SIGNIFICANT CHANGE UP (ref 1–3.3)
LYMPHOCYTES # BLD AUTO: 21.5 % — SIGNIFICANT CHANGE UP (ref 13–44)
MCHC RBC-ENTMCNC: 29.6 PG — SIGNIFICANT CHANGE UP (ref 27–34)
MCHC RBC-ENTMCNC: 31.6 G/DL — LOW (ref 32–36)
MCV RBC AUTO: 93.5 FL — SIGNIFICANT CHANGE UP (ref 80–100)
MONOCYTES # BLD AUTO: 0.51 K/UL — SIGNIFICANT CHANGE UP (ref 0–0.9)
MONOCYTES NFR BLD AUTO: 11 % — SIGNIFICANT CHANGE UP (ref 2–14)
NEUTROPHILS # BLD AUTO: 3.02 K/UL — SIGNIFICANT CHANGE UP (ref 1.8–7.4)
NEUTROPHILS NFR BLD AUTO: 65 % — SIGNIFICANT CHANGE UP (ref 43–77)
NRBC # BLD: 0 /100 WBCS — SIGNIFICANT CHANGE UP (ref 0–0)
PLATELET # BLD AUTO: 120 K/UL — LOW (ref 150–400)
POTASSIUM SERPL-SCNC: 5 MMOL/L
PROT SERPL-MCNC: 6.8 G/DL
PT BLD: 14.2 SEC
RBC # BLD: 4.02 M/UL — SIGNIFICANT CHANGE UP (ref 3.8–5.2)
RBC # FLD: 13 % — SIGNIFICANT CHANGE UP (ref 10.3–14.5)
SODIUM SERPL-SCNC: 141 MMOL/L
WBC # BLD: 4.65 K/UL — SIGNIFICANT CHANGE UP (ref 3.8–10.5)
WBC # FLD AUTO: 4.65 K/UL — SIGNIFICANT CHANGE UP (ref 3.8–10.5)

## 2023-07-12 PROCEDURE — 99213 OFFICE O/P EST LOW 20 MIN: CPT

## 2023-07-12 NOTE — HISTORY OF PRESENT ILLNESS
[Disease: _____________________] : Disease: [unfilled] [AJCC Stage: ____] : AJCC Stage: [unfilled] [de-identified] : Referred by Dr. Cox \par \par Ms. 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 In addition, the patient is s/p micki-colectomy by Dr. Reaves and found to have stage I colon cancer.\par \par The patient reports that she felt well until January 25th 2021 when she had an episode of vaginal bleeding. She has never had post-menopausal bleeding prior to this episode. She presented to local urgent care and subsequently underwent further workup at [facility]. Her oncologic workup is summarized below:\par \par 2/4/21 pelvic US with markedly enlarged uterus, d/t fibroids 13 x 8 x 9 cm\par Endometrial lining 24mm, b/l ovaries not visualized\par \par 3/27/21 pelvic MRI with external iliac LAD measuring 1.7x1.7, with a 9x6mm LN, 13cm fibroid uterus\par \par 4/29/21 D&C\par  EMC- endometrial adenocarcinoma, high grade predominantly c/w serous carcinom and focal areas concerning for clear cell carcinoma\par \par 5/15/21 CT CAP: large left thyroid nodule mildly narrowing the trachea, partially cystic/solid, calcified uterine myomas 5cm, ileocecal intussusception with 5.8cm soft tissue mass lead point which may represent tumor, and para aortic & pelvic LAD, 1.6cm distal left PA LN, 1.7cm left common iliac LN\par \par She was subseuqently referred to gyn onc evaluation and saw Dr. Cox. On 5/20/21, she underwent RASHEEDA/BSO staging surgery which revealed grade 3 clear cell endometrial carcinoma, 40% myometrial invasion, CSI+, LVI+, with ITCs in the right pelvic LN, 1.5cm tumor in left obturator LN, 4/4 left common iliac LNs. No PA ally sampling. Pelvic wash was negative. Final surgical stage [id number], Stage IIIC1. \par \par In addition, she underwent hemicolectomy with Dr. Osiel Reaves on 5/20/21.\par Surgical pathology: right colectomy specimen shows 4 mm invasive moderately-differentiated adenocarcinoma arising from a large tubulovillous adenoma. Staging I T1N0 (0/12 nodes). MMR intact protein expression. \par \par She has been referred to medical oncology for consideration of adjuvant chemotherapy for her endometrial cancer. She feels well overall and has been recovering from her surgery without significant issues. She is eating and drinking without n/v. She is having regular BMs. She denies fevers, chills, n/v, abdominal pain, neuropathy, vaginal discharge or bleeding, urinary symptoms, cough, CP, SOB.\par \par \par PMHx: DM, HTN, hypothyroid \par Meds: metformin, levothyroxine, lipitor, norvasc\par \par Family Hx of Cancer: n/a\par SH: Denies smoking or alcohol use; \par lives with daughter Carin, , has 4 adult children\par \par HM\par Pap- n/a \par Mammo- n/a\par Colonoscopy- 5/16/21 [de-identified] : stage I colon cancer. [FreeTextEntry1] : surgery on 5/20/21 --> adjuvant Carbo/Taxol June 28, 2021 s/p cycle 6/6 on 10/11/2021--> EBRT 25 tx 12/1/21 - 1/26/2022 [de-identified] : Ximena is here for follow up while on surveillance. Last scans on 4/4/23 - DEVON, with fat containing umbilical hernia. In the interim, was seen by Dr Trejo, exam normal. She missed her appointment last week due to diarrhea r/t drinking peanut punch. Diarrhea has resolved. Patient is feeling well overall and staying active, planning to go back to [place] the end of next month. .Has good appetite, eating and drinking well. Reports numbness to b/l soles of feet, has improved since last visit. Her port was removed about 2 weeks ago, healing well. Denies fever, chills, mouth sores, CP, palpitations, cough, JOHN, n/v/d/c, abdominal pain, LE edema, vaginal discharge or bleeding, urinary symptoms, excessive bruising, dizziness, headaches, arthralgias, myalgias. \par \par \par

## 2023-07-12 NOTE — REVIEW OF SYSTEMS
[Negative] : Allergic/Immunologic [Recent Change In Weight] : ~T no recent weight change [Lower Ext Edema] : no lower extremity edema [SOB on Exertion] : no shortness of breath during exertion [Confused] : no confusion [Fainting] : no fainting [Difficulty Walking] : no difficulty walking [FreeTextEntry7] : janett resolved [FreeTextEntry9] : foot cramping [de-identified] : neuropathy b/l soles of feet

## 2023-07-13 LAB — CANCER AG125 SERPL-ACNC: 4 U/ML

## 2023-07-21 ENCOUNTER — NON-APPOINTMENT (OUTPATIENT)
Age: 83
End: 2023-07-21

## 2023-08-07 ENCOUNTER — APPOINTMENT (OUTPATIENT)
Dept: MAMMOGRAPHY | Facility: IMAGING CENTER | Age: 83
End: 2023-08-07
Payer: MEDICARE

## 2023-08-07 ENCOUNTER — OUTPATIENT (OUTPATIENT)
Dept: OUTPATIENT SERVICES | Facility: HOSPITAL | Age: 83
LOS: 1 days | End: 2023-08-07
Payer: COMMERCIAL

## 2023-08-07 ENCOUNTER — RESULT REVIEW (OUTPATIENT)
Age: 83
End: 2023-08-07

## 2023-08-07 DIAGNOSIS — C54.1 MALIGNANT NEOPLASM OF ENDOMETRIUM: ICD-10-CM

## 2023-08-07 DIAGNOSIS — Z98.51 TUBAL LIGATION STATUS: Chronic | ICD-10-CM

## 2023-08-07 DIAGNOSIS — Z98.890 OTHER SPECIFIED POSTPROCEDURAL STATES: Chronic | ICD-10-CM

## 2023-08-07 PROCEDURE — 77063 BREAST TOMOSYNTHESIS BI: CPT

## 2023-08-07 PROCEDURE — 77067 SCR MAMMO BI INCL CAD: CPT

## 2023-08-07 PROCEDURE — 77063 BREAST TOMOSYNTHESIS BI: CPT | Mod: 26

## 2023-08-07 PROCEDURE — 77067 SCR MAMMO BI INCL CAD: CPT | Mod: 26

## 2023-11-27 ENCOUNTER — APPOINTMENT (OUTPATIENT)
Dept: GYNECOLOGIC ONCOLOGY | Facility: CLINIC | Age: 83
End: 2023-11-27

## 2023-11-27 ENCOUNTER — NON-APPOINTMENT (OUTPATIENT)
Age: 83
End: 2023-11-27

## 2024-03-12 ENCOUNTER — RESULT REVIEW (OUTPATIENT)
Age: 84
End: 2024-03-12

## 2024-04-05 ENCOUNTER — OUTPATIENT (OUTPATIENT)
Dept: OUTPATIENT SERVICES | Facility: HOSPITAL | Age: 84
LOS: 1 days | Discharge: HOME | End: 2024-04-05

## 2024-04-05 DIAGNOSIS — D64.9 ANEMIA, UNSPECIFIED: ICD-10-CM

## 2024-04-05 DIAGNOSIS — Z98.890 OTHER SPECIFIED POSTPROCEDURAL STATES: Chronic | ICD-10-CM

## 2024-04-05 DIAGNOSIS — Z98.51 TUBAL LIGATION STATUS: Chronic | ICD-10-CM

## 2024-04-08 ENCOUNTER — EMERGENCY (EMERGENCY)
Facility: HOSPITAL | Age: 84
LOS: 1 days | Discharge: HOME | End: 2024-04-08
Attending: STUDENT IN AN ORGANIZED HEALTH CARE EDUCATION/TRAINING PROGRAM | Admitting: STUDENT IN AN ORGANIZED HEALTH CARE EDUCATION/TRAINING PROGRAM
Payer: MEDICARE

## 2024-04-08 ENCOUNTER — OUTPATIENT (OUTPATIENT)
Dept: OUTPATIENT SERVICES | Facility: HOSPITAL | Age: 84
LOS: 1 days | End: 2024-04-08
Payer: COMMERCIAL

## 2024-04-08 ENCOUNTER — APPOINTMENT (OUTPATIENT)
Dept: CT IMAGING | Facility: IMAGING CENTER | Age: 84
End: 2024-04-08
Payer: MEDICARE

## 2024-04-08 ENCOUNTER — APPOINTMENT (OUTPATIENT)
Dept: GYNECOLOGIC ONCOLOGY | Facility: CLINIC | Age: 84
End: 2024-04-08
Payer: MEDICARE

## 2024-04-08 VITALS
HEIGHT: 66 IN | WEIGHT: 175 LBS | SYSTOLIC BLOOD PRESSURE: 179 MMHG | HEART RATE: 51 BPM | BODY MASS INDEX: 28.12 KG/M2 | DIASTOLIC BLOOD PRESSURE: 78 MMHG

## 2024-04-08 VITALS — DIASTOLIC BLOOD PRESSURE: 71 MMHG | HEART RATE: 52 BPM | SYSTOLIC BLOOD PRESSURE: 176 MMHG

## 2024-04-08 VITALS
SYSTOLIC BLOOD PRESSURE: 209 MMHG | OXYGEN SATURATION: 98 % | TEMPERATURE: 98 F | HEART RATE: 65 BPM | RESPIRATION RATE: 17 BRPM | DIASTOLIC BLOOD PRESSURE: 99 MMHG

## 2024-04-08 DIAGNOSIS — C54.1 MALIGNANT NEOPLASM OF ENDOMETRIUM: ICD-10-CM

## 2024-04-08 DIAGNOSIS — Z98.890 OTHER SPECIFIED POSTPROCEDURAL STATES: Chronic | ICD-10-CM

## 2024-04-08 DIAGNOSIS — Z98.51 TUBAL LIGATION STATUS: Chronic | ICD-10-CM

## 2024-04-08 DIAGNOSIS — Z00.8 ENCOUNTER FOR OTHER GENERAL EXAMINATION: ICD-10-CM

## 2024-04-08 LAB
ALBUMIN SERPL ELPH-MCNC: 4.2 G/DL — SIGNIFICANT CHANGE UP (ref 3.3–5)
ALP SERPL-CCNC: 120 U/L — SIGNIFICANT CHANGE UP (ref 40–120)
ALT FLD-CCNC: 19 U/L — SIGNIFICANT CHANGE UP (ref 4–33)
ANION GAP SERPL CALC-SCNC: 13 MMOL/L — SIGNIFICANT CHANGE UP (ref 7–14)
APTT BLD: 29.2 SEC — SIGNIFICANT CHANGE UP (ref 24.5–35.6)
AST SERPL-CCNC: 32 U/L — SIGNIFICANT CHANGE UP (ref 4–32)
BASOPHILS # BLD AUTO: 0.02 K/UL — SIGNIFICANT CHANGE UP (ref 0–0.2)
BASOPHILS NFR BLD AUTO: 0.4 % — SIGNIFICANT CHANGE UP (ref 0–2)
BILIRUB SERPL-MCNC: 0.8 MG/DL — SIGNIFICANT CHANGE UP (ref 0.2–1.2)
BUN SERPL-MCNC: 20 MG/DL — SIGNIFICANT CHANGE UP (ref 7–23)
CALCIUM SERPL-MCNC: 10.3 MG/DL — SIGNIFICANT CHANGE UP (ref 8.4–10.5)
CHLORIDE SERPL-SCNC: 105 MMOL/L — SIGNIFICANT CHANGE UP (ref 98–107)
CO2 SERPL-SCNC: 24 MMOL/L — SIGNIFICANT CHANGE UP (ref 22–31)
CREAT SERPL-MCNC: 1.25 MG/DL — SIGNIFICANT CHANGE UP (ref 0.5–1.3)
EGFR: 42 ML/MIN/1.73M2 — LOW
EOSINOPHIL # BLD AUTO: 0.11 K/UL — SIGNIFICANT CHANGE UP (ref 0–0.5)
EOSINOPHIL NFR BLD AUTO: 2.3 % — SIGNIFICANT CHANGE UP (ref 0–6)
GLUCOSE SERPL-MCNC: 110 MG/DL — HIGH (ref 70–99)
HCT VFR BLD CALC: 37.9 % — SIGNIFICANT CHANGE UP (ref 34.5–45)
HGB BLD-MCNC: 12.2 G/DL — SIGNIFICANT CHANGE UP (ref 11.5–15.5)
IANC: 3.16 K/UL — SIGNIFICANT CHANGE UP (ref 1.8–7.4)
IMM GRANULOCYTES NFR BLD AUTO: 0.2 % — SIGNIFICANT CHANGE UP (ref 0–0.9)
INR BLD: 0.96 RATIO — SIGNIFICANT CHANGE UP (ref 0.85–1.18)
LIDOCAIN IGE QN: 9 U/L — SIGNIFICANT CHANGE UP (ref 7–60)
LYMPHOCYTES # BLD AUTO: 1.07 K/UL — SIGNIFICANT CHANGE UP (ref 1–3.3)
LYMPHOCYTES # BLD AUTO: 22.6 % — SIGNIFICANT CHANGE UP (ref 13–44)
MCHC RBC-ENTMCNC: 29.3 PG — SIGNIFICANT CHANGE UP (ref 27–34)
MCHC RBC-ENTMCNC: 32.2 GM/DL — SIGNIFICANT CHANGE UP (ref 32–36)
MCV RBC AUTO: 91.1 FL — SIGNIFICANT CHANGE UP (ref 80–100)
MONOCYTES # BLD AUTO: 0.37 K/UL — SIGNIFICANT CHANGE UP (ref 0–0.9)
MONOCYTES NFR BLD AUTO: 7.8 % — SIGNIFICANT CHANGE UP (ref 2–14)
NEUTROPHILS # BLD AUTO: 3.16 K/UL — SIGNIFICANT CHANGE UP (ref 1.8–7.4)
NEUTROPHILS NFR BLD AUTO: 66.7 % — SIGNIFICANT CHANGE UP (ref 43–77)
NRBC # BLD: 0 /100 WBCS — SIGNIFICANT CHANGE UP (ref 0–0)
NRBC # FLD: 0 K/UL — SIGNIFICANT CHANGE UP (ref 0–0)
PLATELET # BLD AUTO: 115 K/UL — LOW (ref 150–400)
POTASSIUM SERPL-MCNC: 4.6 MMOL/L — SIGNIFICANT CHANGE UP (ref 3.5–5.3)
POTASSIUM SERPL-SCNC: 4.6 MMOL/L — SIGNIFICANT CHANGE UP (ref 3.5–5.3)
PROT SERPL-MCNC: 7.6 G/DL — SIGNIFICANT CHANGE UP (ref 6–8.3)
PROTHROM AB SERPL-ACNC: 10.9 SEC — SIGNIFICANT CHANGE UP (ref 9.5–13)
RBC # BLD: 4.16 M/UL — SIGNIFICANT CHANGE UP (ref 3.8–5.2)
RBC # FLD: 13.5 % — SIGNIFICANT CHANGE UP (ref 10.3–14.5)
SODIUM SERPL-SCNC: 142 MMOL/L — SIGNIFICANT CHANGE UP (ref 135–145)
WBC # BLD: 4.74 K/UL — SIGNIFICANT CHANGE UP (ref 3.8–10.5)
WBC # FLD AUTO: 4.74 K/UL — SIGNIFICANT CHANGE UP (ref 3.8–10.5)

## 2024-04-08 PROCEDURE — 74177 CT ABD & PELVIS W/CONTRAST: CPT | Mod: 26

## 2024-04-08 PROCEDURE — 71260 CT THORAX DX C+: CPT | Mod: 26

## 2024-04-08 PROCEDURE — 71260 CT THORAX DX C+: CPT

## 2024-04-08 PROCEDURE — 99285 EMERGENCY DEPT VISIT HI MDM: CPT

## 2024-04-08 PROCEDURE — 99214 OFFICE O/P EST MOD 30 MIN: CPT

## 2024-04-08 PROCEDURE — 93010 ELECTROCARDIOGRAM REPORT: CPT

## 2024-04-08 PROCEDURE — 74177 CT ABD & PELVIS W/CONTRAST: CPT

## 2024-04-08 RX ORDER — METOPROLOL TARTRATE 50 MG
50 TABLET ORAL ONCE
Refills: 0 | Status: DISCONTINUED | OUTPATIENT
Start: 2024-04-08 | End: 2024-04-08

## 2024-04-08 NOTE — ED PROVIDER NOTE - PATIENT PORTAL LINK FT
You can access the FollowMyHealth Patient Portal offered by [facility] by registering at the following website: [email or web address]. By joining [employer]’s FollowMyHealth portal, you will also be able to view your health information using other applications (apps) compatible with our system.

## 2024-04-08 NOTE — HISTORY OF PRESENT ILLNESS
[FreeTextEntry1] : IIIC clear cell endometrial cancer T1N0 colon cancer  Robotic TLH/BSO/PLND 5/20/21 (Brooke) Robotic right hemicolectomy 5/20/21 (Jean Paul) Carboplatin/paclitaxel x 6 6/28/21-10/11/21 EBRT pelvis/PA nodes 4500 cGy and VBT 1200 cGy 11/30/21-1/21/22   She saw FRANK Gonzales for follow up 11/2023, no-show for gyn onc appt in 11/2023 due to family emergency.  last appt with gyn onc in 5/2023 CA-125 in 7/2023 was 4 CT 4/2023 DEVON  Denies abdominal/pelvic pain, dyspnea or chest pain, vaginal bleeding or discharge, nausea/vomiting, changes in bowel habits or urination, lower extremity edema or pain.

## 2024-04-08 NOTE — ED PROVIDER NOTE - PROGRESS NOTE DETAILS
Leonard, PGY-3, EM: s/w surgical resident. Leonard, PGY-3, EM: s/w surgical resident. they will evaluate the patient. Leonard, PGY-3, EM: Patient was evaluated by surgery they were recommending that patient have a heme-onc consult. they are recommending 3 mo of AC and 3 mo rpt scan. patient follows closely with heme-onc at [facility]. Given pt is asymptomatic discussed inpatient w/u vs. close outpatient follow w/ the hospitalist. Pt would like to follow up outpatient. return precautions discussed. will order eliquis to be given here an a dose pack [facility]. after discussion w/ pt and her daughter they are in agreement with the plan. they verbalized need to return if the pt develops any abd pain, nausea, vomiting, diarrhea, chest pain or trouble breathing.

## 2024-04-08 NOTE — ED PROVIDER NOTE - PHYSICAL EXAMINATION
General: well-appearing, no acute distress  Head: Atraumatic, normocephalic  Eyes: EOM grossly in tact, no scleral icterus, no discharge  Neurology: A&Ox 3, nonfocal, AMBROSIO x 4  Respiratory: CTAB, no wheezing, normal respiratory effort  CV: RRR, good s1/s2, no S3, Extremities warm and well perfused  Abdominal: Soft, non-distended, non-tender, no masses  Extremities: No edema, no deformities  Skin: warm and dry. No rashes

## 2024-04-08 NOTE — ED ADULT NURSE NOTE - OBJECTIVE STATEMENT
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 Pt received A&Ox4 ,able to make needs known. Pt observed with respirations even and non-labored. Bilateral lungs sounds clear. Color WNL for race.  Pt noted skin warm and dry. Pt placed on Cardiac monitor. PT sinus rhythm on monitor. Pt remains hypertensive. As per pt , she was hypertensive since today CT scan. Pt denies headache , chest pain or dizziness.  Pt abdomen soft , positive bowels sounds all 4 quadrants.  Pt continent of bowel ands blader.  Pt ambulates with a steady gait without an assistive device.  Pt denies any pain or discomfort. Pt resting on stretcher with bed in lowest position with call bell in place.

## 2024-04-08 NOTE — ED PROVIDER NOTE - CLINICAL SUMMARY MEDICAL DECISION MAKING FREE TEXT BOX
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 Pt presenting w/ an incidental finding on imaging. Given finding would check labs. CT available in our PACS so no indication for additional imaging. will consult vascular to determine recommendations. in absence of pain, pt unlikely to require surgery. will likely need AC, heparin vs. NOAC will be based on recs from surgery vs. heme/onc. which will determine disposition.

## 2024-04-08 NOTE — ED PROVIDER NOTE - OBJECTIVE STATEMENT
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  Patient has screening CTs performed every 3 months, this was not seen on prior studies.  They noticed this today and recommended she be evaluated in the ER.  Patient denies abdominal pain, nausea, vomiting, trouble urinating, pain with urination, chest pain, trouble breathing.  LE pain or swelling.  She does not currently take any anticoagulation medications, but previously took AC during her CA treatment.

## 2024-04-08 NOTE — ED PROVIDER NOTE - NSICDXPASTMEDICALHX_GEN_ALL_CORE_FT
PAST MEDICAL HISTORY:  Diabetes mellitus     HLD (hyperlipidemia)     HTN (hypertension)     Hypothyroidism     Malignant neoplasm of colon     Malignant neoplasm of endometrium     Thyroid nodule

## 2024-04-08 NOTE — ED PROVIDER NOTE - NSFOLLOWUPINSTRUCTIONS_ED_ALL_ED_FT
Please follow up with your primary care physician within 2-3 days.   Return to the ER for any new or concerning symptoms.   You may take 975 mg acetaminophen every 6 hours as needed for pain.    You were seen in the ER for the finding of a superior mesenteric vein thrombosis.    We consulted our vascular surgery team who recommended you be started on anticoagulation, they want you to be evaluated by a hematologist to perform a hypercoagulability workup.  We discussed that this could occur in the hospital, but given that you follow closely at [facility], you should call and set up an appointment to have this performed outpatient. Please call tomorrow and set up the appointment.     If you develop any abdominal pain, nausea, vomiting, fever, chills, inability to tolerate p.o., diarrhea, chest pain or trouble breathing please return to the ER to be evaluated immediately.    You need a repeat CT scan to determine if the thrombosis has resolved in 3 months.  This can be done outpatient, you can discuss this with your hematologist or your primary care doctor to have this set up.    We sent Eliquis an anticoagulation medication to [facility] in [facility].  You were given the initial dose in the ER tonight.  Please  the prescription tomorrow, after the initial prescription you will need to get your refills from your primary doctor, call them to let them know that this is necessary.

## 2024-04-08 NOTE — ED PROVIDER NOTE - ATTENDING CONTRIBUTION TO CARE
83F h/o type 2 DM, HTN, uterine Ca now cancer free sent in after outpatient screening imaging showed SMV thrombosis. Pt received CT every 3 months to monitor uterine Ca and was incidentally found to have clot. Denies abd pain, n/v/d. She does not currently take any anticoagulation, but did previously take while undergoing cancer treatment.  Gen: well-appearing, no acute distress  CV: rrr, no appreciable murmur  Respiratory: CTAB, no wheezing, normal respiratory effort  Abdominal: Soft, non-distended, non-tender, no masses  Extremities: No edema, no deformities  Skin: warm and dry. No rashes  MDM: 83F h/o type 2 DM, HTN, uterine Ca now cancer free sent in after outpatient screening imaging showed SMV thrombosis. Will need therapeutic anticoagulation, but will first have vascular assess to determine optimal treatment plan. Check cbc for baseline plt and h/h prior to initiation. Check coag panel. Check lactate given SMV clot though since patient is asymptomatic unlikely to be any evidence of ischemia.

## 2024-04-08 NOTE — PHYSICAL EXAM
Spoke to Kyree Villarreal regarding pt. Oral temp. Of 102.5,and an immediate recheck of 103 orally and throwing up oral tylenol 650 mg PO. MD informed of pt. Recent Paracentesis. Stat Blood cultures, CBC ordered.  Tylenol suppository 650 mg q6h ordered [Chaperone Present] : A chaperone was present in the examining room during all aspects of the physical examination [Absent] : Adnexa(ae): Absent [Normal] : Recto-Vaginal Exam: Normal [de-identified] : healed laparoscopic incisions

## 2024-04-08 NOTE — ED PROVIDER NOTE - NSICDXPASTSURGICALHX_GEN_ALL_CORE_FT
PAST SURGICAL HISTORY:  H/O tubal ligation and bladder lift 1980    History of surgery s/p combined robotic RASHEEDA/BSO/PPALND and right colectomy 05/20/2021

## 2024-04-08 NOTE — ED ADULT TRIAGE NOTE - CHIEF COMPLAINT QUOTE
patient sent to ER by MD for Superior mesenteric vein clot. patient states she has a past medical history of uterine cancer and was getting her CT scan when she got a call from the radiologist that she has a clot and to come to the ER urgently. past medical history of uterine cancer, diabetes mellitus type 2

## 2024-04-09 DIAGNOSIS — K55.069 ACUTE INFARCTION OF INTESTINE, PART AND EXTENT UNSPECIFIED: ICD-10-CM

## 2024-04-09 PROCEDURE — 99221 1ST HOSP IP/OBS SF/LOW 40: CPT

## 2024-04-09 RX ORDER — APIXABAN 2.5 MG/1
2 TABLET, FILM COATED ORAL
Qty: 28 | Refills: 0
Start: 2024-04-09 | End: 2024-04-15

## 2024-04-09 RX ORDER — HEPARIN SODIUM 5000 [USP'U]/ML
INJECTION INTRAVENOUS; SUBCUTANEOUS
Qty: 25000 | Refills: 0 | Status: DISCONTINUED | OUTPATIENT
Start: 2024-04-09 | End: 2024-04-09

## 2024-04-09 RX ORDER — HEPARIN SODIUM 5000 [USP'U]/ML
3000 INJECTION INTRAVENOUS; SUBCUTANEOUS EVERY 6 HOURS
Refills: 0 | Status: DISCONTINUED | OUTPATIENT
Start: 2024-04-09 | End: 2024-04-09

## 2024-04-09 RX ORDER — APIXABAN 2.5 MG/1
10 TABLET, FILM COATED ORAL ONCE
Refills: 0 | Status: COMPLETED | OUTPATIENT
Start: 2024-04-09 | End: 2024-04-09

## 2024-04-09 RX ORDER — APIXABAN 2.5 MG/1
1 TABLET, FILM COATED ORAL
Qty: 42 | Refills: 0
Start: 2024-04-09 | End: 2024-04-29

## 2024-04-09 RX ORDER — HEPARIN SODIUM 5000 [USP'U]/ML
6000 INJECTION INTRAVENOUS; SUBCUTANEOUS EVERY 6 HOURS
Refills: 0 | Status: DISCONTINUED | OUTPATIENT
Start: 2024-04-09 | End: 2024-04-09

## 2024-04-09 RX ORDER — HEPARIN SODIUM 5000 [USP'U]/ML
6000 INJECTION INTRAVENOUS; SUBCUTANEOUS ONCE
Refills: 0 | Status: DISCONTINUED | OUTPATIENT
Start: 2024-04-09 | End: 2024-04-09

## 2024-04-09 RX ADMIN — APIXABAN 10 MILLIGRAM(S): 2.5 TABLET, FILM COATED ORAL at 02:06

## 2024-04-09 NOTE — ED ADULT NURSE REASSESSMENT NOTE - NS ED NURSE REASSESS COMMENT FT1
Pt reports taking her evening blood pressure medications from Norvasc/ Valsartan around 2230.  Pt noted Sinus Bradycardia on monitor at 52 BPM.  Pt denies SOB or dizziness. Provider informed of pt taking home HTN medications .  Lopressor canceled.  Pt awaiting Dispo .
Break RN: Pt received in stretcher in room. Pt resting comfortably. pt offered no complains at present. respiration even and non-labored. in NAD. Discharge in progress. IV removed.

## 2024-04-09 NOTE — CONSULT NOTE ADULT - ASSESSMENT
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 Patient is asymptomatic.    -No acute surgical intervention  -No indication for thrombectomy at this point  -Recommend anticoagulation for 3 months and repeat scan  -Heme/Onc consult to evaluate for hypercoagulable disorder    D/w Vascular Surgery Fellow on call    Anderson Blood MD  PGY-3  C Team

## 2024-04-09 NOTE — CONSULT NOTE ADULT - ATTENDING COMMENTS
I Glenn Ponce MD have participated in the daily care of this patient and discussed  the findings and plan with the house officer. I reviewed the resident note and agree with the findings and plan

## 2024-04-09 NOTE — ED POST DISCHARGE NOTE - RESULT SUMMARY
Medication follow up. Patient called back because she was unable to  her prescription at [facility] because of her insurance. The patient would like to have [facility]. Instructed the patient to call back if there are any issues.

## 2024-04-09 NOTE — CONSULT NOTE ADULT - SUBJECTIVE AND OBJECTIVE BOX
HPI: 83F PMH robotic R hemicolectomy and RASHEEDA w/ BSO 2/2 adenocarcinoma s/p chemotherapy (2021) presents to ED after surveillance imaging shows SMV thrombus on a CT A/P w/ IV contrast.    In the ED patient HDS. No labs available. Patient endorses she feels well, has not had any abdominal pain whatsoever or bloating. Passing gas and BM as normal. Previous CT scans reviewed with Radiology, no evidence of thrombus noted. Patient denies any history of pancreatitis, blood thinner use, liver disease. Endorses she receives surveillance scans every 3 months but has never had this diagnosis before. Per CT, no no evidence of recurrence.    PMH: As above    PSH: As above    Allergies: NKDA    Physical exam:    /71 HR 52 SPO2 100 T 98.1 RR 17     General- NAD. Pleasant and responsive  Abdomen- Soft, non distended. Well healed robotic port sites. Abdomen non tender across all quadrants  Lungs- Comfortable on room air  Extremities- Symmetric, no swelling    Imaging:    EXAM: [id number] - CT ABDOMEN AND PELVIS OC IC  - ORDERED BY: JIMENA SOLOMON    EXAM: [id number] - CT CHEST IC  - ORDERED BY: JIMENA SOLOMON      PROCEDURE DATE:  04/08/2024           INTERPRETATION:  CLINICAL INFORMATION: Endometrial cancer. Thyroid   nodule. Follow-up.    COMPARISON: CT 4/4/2023 and 8/5/2022.    CONTRAST/COMPLICATIONS:  IV Contrast: Isovue 370  90 cc administered   10 cc discarded  Oral Contrast: Omnipaque 300  Complications: None reported at time of study completion    PROCEDURE:  CT of the Chest, Abdomen and Pelvis was performed.  Sagittal and coronal reformats were performed.    FINDINGS:  CHEST:  LUNGS AND LARGE AIRWAYS: Patent central airways. No pulmonary nodules.  PLEURA: No pleural effusion.  VESSELS: Within normal limits.  HEART: Enlarged. No pericardial effusion. Coronary artery calcification.  MEDIASTINUM AND GETACHEW: No lymphadenopathy.  CHEST WALL AND LOWER NECK: Dominant, heterogeneous left lobe thyroid   nodule with substernal extension measures 5.8 x 4.9 cm, previously 5.5 x   4.9 cm on 3/24/2022. Rightward deviation of the trachea due to mass   effect, similar to prior exam.    ABDOMEN AND PELVIS:  LIVER: Within normal limits.  BILE DUCTS: Normal caliber.  GALLBLADDER: Cholelithiasis.  SPLEEN: Within normal limits.  PANCREAS: Diffuse parenchymal atrophy.  ADRENALS: Within normal limits.  KIDNEYS/URETERS: Within normal limits.    BLADDER: Excreted contrast and mixing artifact within the bladder.  REPRODUCTIVE ORGANS: Hysterectomy. Vaginal cuff is unremarkable.    BOWEL: Right hemicolectomy. No bowel obstruction.  PERITONEUM: No ascites.  VESSELS: Cavernous transformation of the portal vein, unchanged. New   short segment occlusive SMV thrombus compared to 4/4/2023 (2, 88).   Atherosclerotic change.  RETROPERITONEUM/LYMPH NODES: No lymphadenopathy.  ABDOMINAL WALL: Small bilateral fat-containing inguinal hernias.  BONES: Degenerative changes.    IMPRESSION:  *  Dominant 5.8 cm left thyroid nodule is without significant change from   3/24/2022 (5.5 cm).  *  Hysterectomy. No evidence of recurrent disease.  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    Findings were discussed with Dr. SOLOMON on 4/8/2024 at 3:16 PM by Dr. PONCE with read back confirmation.    --- End of Report ---                BRIDGETTE PONCE DO; Attending Radiologist   This document has been electronically signed. Apr 8 2024  4:54PM

## 2024-04-10 ENCOUNTER — APPOINTMENT (OUTPATIENT)
Dept: HEMATOLOGY ONCOLOGY | Facility: CLINIC | Age: 84
End: 2024-04-10

## 2024-04-16 ENCOUNTER — APPOINTMENT (OUTPATIENT)
Dept: HEMATOLOGY ONCOLOGY | Facility: CLINIC | Age: 84
End: 2024-04-16
Payer: MEDICARE

## 2024-04-16 DIAGNOSIS — I82.90 ACUTE EMBOLISM AND THROMBOSIS OF UNSPECIFIED VEIN: ICD-10-CM

## 2024-04-16 PROCEDURE — 99214 OFFICE O/P EST MOD 30 MIN: CPT

## 2024-04-27 PROBLEM — I82.90 THROMBUS: Status: ACTIVE | Noted: 2024-04-27

## 2024-04-27 NOTE — HISTORY OF PRESENT ILLNESS
[Disease: _____________________] : Disease: [unfilled] [AJCC Stage: ____] : AJCC Stage: [unfilled] [de-identified] : Referred by Dr. Cox \par  \par  Ms. 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 In addition, the patient is s/p micki-colectomy by Dr. Reaves and found to have stage I colon cancer.\par  \par  The patient reports that she felt well until January 25th 2021 when she had an episode of vaginal bleeding. She has never had post-menopausal bleeding prior to this episode. She presented to local urgent care and subsequently underwent further workup at [facility]. Her oncologic workup is summarized below:\par  \par  2/4/21 pelvic US with markedly enlarged uterus, d/t fibroids 13 x 8 x 9 cm\par  Endometrial lining 24mm, b/l ovaries not visualized\par  \par  3/27/21 pelvic MRI with external iliac LAD measuring 1.7x1.7, with a 9x6mm LN, 13cm fibroid uterus\par  \par  4/29/21 D&C\par   EMC- endometrial adenocarcinoma, high grade predominantly c/w serous carcinom and focal areas concerning for clear cell carcinoma\par  \par  5/15/21 CT CAP: large left thyroid nodule mildly narrowing the trachea, partially cystic/solid, calcified uterine myomas 5cm, ileocecal intussusception with 5.8cm soft tissue mass lead point which may represent tumor, and para aortic & pelvic LAD, 1.6cm distal left PA LN, 1.7cm left common iliac LN\par  \par  She was subseuqently referred to gyn onc evaluation and saw Dr. Cox. On 5/20/21, she underwent RASHEEDA/BSO staging surgery which revealed grade 3 clear cell endometrial carcinoma, 40% myometrial invasion, CSI+, LVI+, with ITCs in the right pelvic LN, 1.5cm tumor in left obturator LN, 4/4 left common iliac LNs. No PA ally sampling. Pelvic wash was negative. Final surgical stage [id number], Stage IIIC1. \par  \par  In addition, she underwent hemicolectomy with Dr. Osiel Reaves on 5/20/21.\par  Surgical pathology: right colectomy specimen shows 4 mm invasive moderately-differentiated adenocarcinoma arising from a large tubulovillous adenoma. Staging I T1N0 (0/12 nodes). MMR intact protein expression. \par  \par  She has been referred to medical oncology for consideration of adjuvant chemotherapy for her endometrial cancer. She feels well overall and has been recovering from her surgery without significant issues. She is eating and drinking without n/v. She is having regular BMs. She denies fevers, chills, n/v, abdominal pain, neuropathy, vaginal discharge or bleeding, urinary symptoms, cough, CP, SOB.\par  \par  \par  PMHx: DM, HTN, hypothyroid \par  Meds: metformin, levothyroxine, lipitor, norvasc\par  \par  Family Hx of Cancer: n/a\par  SH: Denies smoking or alcohol use; \par  lives with daughter Carin, , has 4 adult children\par  \par  HM\par  Pap- n/a \par  Mammo- n/a\par  Colonoscopy- 5/16/21 [de-identified] : stage I colon cancer. [FreeTextEntry1] : surgery on 5/20/21 --> adjuvant Carbo/Taxol June 28, 2021 s/p cycle 6/6 on 10/11/2021--> EBRT 25 tx 12/1/21 - 1/26/2022 [Home] : at home, [unfilled] , at the time of the visit. [Medical Office: ([facility])___] : at the medical office located in  [Verbal consent obtained from patient] : the patient, [unfilled] [de-identified] : Ximena is here for follow up while on surveillance via telehealth visit. She underwent CT scans for suveillance on 4/8: IMPRESSION: *  Dominant 5.8 cm left thyroid nodule is without significant change from 3/24/2022 (5.5 cm). *  Hysterectomy. No evidence of recurrent disease. 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  She was evaluated in [facility] ED for the SMV thrombus and seen by vascular surgery. No surgical interventions recommended. Patient was started on anticoagulation with Eliquis. She feels well without any new or concerning symptoms. Good appetite, eating/drinking well, with regular BMs. She denies fevers, chills, n/v, abdominal pain, neuropathy, vaginal discharge or bleeding, urinary symptoms, cough, CP, SOB. She recently returned from trip to [place] for several months.

## 2024-04-27 NOTE — REVIEW OF SYSTEMS
[Recent Change In Weight] : ~T no recent weight change [Lower Ext Edema] : no lower extremity edema [SOB on Exertion] : no shortness of breath during exertion [Confused] : no confusion [Fainting] : no fainting [Difficulty Walking] : no difficulty walking [Negative] : Allergic/Immunologic [FreeTextEntry7] : janett resolved [FreeTextEntry9] : foot cramping [de-identified] : neuropathy b/l soles of feet

## 2024-05-15 RX ORDER — APIXABAN 5 MG/1
5 TABLET, FILM COATED ORAL
Qty: 60 | Refills: 1 | Status: ACTIVE | COMMUNITY
Start: 2024-05-15 | End: 1900-01-01

## 2024-05-24 LAB
ALBUMIN SERPL ELPH-MCNC: 4.2 G/DL
ALP BLD-CCNC: 114 U/L
ALT SERPL-CCNC: 13 U/L
ANION GAP SERPL CALC-SCNC: 8 MMOL/L
AST SERPL-CCNC: 18 U/L
BILIRUB SERPL-MCNC: 0.7 MG/DL
BUN SERPL-MCNC: 15 MG/DL
CALCIUM SERPL-MCNC: 10.7 MG/DL
CHLORIDE SERPL-SCNC: 108 MMOL/L
CO2 SERPL-SCNC: 29 MMOL/L
CREAT SERPL-MCNC: 1.2 MG/DL
EGFR: 45 ML/MIN/1.73M2
GLUCOSE SERPL-MCNC: 93 MG/DL
POTASSIUM SERPL-SCNC: 4.7 MMOL/L
PROT SERPL-MCNC: 6.4 G/DL
SODIUM SERPL-SCNC: 145 MMOL/L

## 2024-06-30 ENCOUNTER — NON-APPOINTMENT (OUTPATIENT)
Age: 84
End: 2024-06-30

## 2024-07-01 ENCOUNTER — RESULT REVIEW (OUTPATIENT)
Age: 84
End: 2024-07-01

## 2024-07-01 ENCOUNTER — APPOINTMENT (OUTPATIENT)
Dept: HEMATOLOGY ONCOLOGY | Facility: CLINIC | Age: 84
End: 2024-07-01
Payer: MEDICARE

## 2024-07-01 VITALS
HEART RATE: 59 BPM | WEIGHT: 178.55 LBS | SYSTOLIC BLOOD PRESSURE: 152 MMHG | OXYGEN SATURATION: 99 % | DIASTOLIC BLOOD PRESSURE: 77 MMHG | TEMPERATURE: 97 F | RESPIRATION RATE: 16 BRPM | BODY MASS INDEX: 28.82 KG/M2

## 2024-07-01 DIAGNOSIS — C54.1 MALIGNANT NEOPLASM OF ENDOMETRIUM: ICD-10-CM

## 2024-07-01 DIAGNOSIS — M25.562 PAIN IN LEFT KNEE: ICD-10-CM

## 2024-07-01 DIAGNOSIS — Z01.818 ENCOUNTER FOR OTHER PREPROCEDURAL EXAMINATION: ICD-10-CM

## 2024-07-01 DIAGNOSIS — Z87.2 PERSONAL HISTORY OF DISEASES OF THE SKIN AND SUBCUTANEOUS TISSUE: ICD-10-CM

## 2024-07-01 DIAGNOSIS — Z92.3 PERSONAL HISTORY OF IRRADIATION: ICD-10-CM

## 2024-07-01 LAB
ALBUMIN SERPL ELPH-MCNC: 4.2 G/DL
ALP BLD-CCNC: 138 U/L
ALT SERPL-CCNC: 15 U/L
ANION GAP SERPL CALC-SCNC: 8 MMOL/L
AST SERPL-CCNC: 20 U/L
BILIRUB SERPL-MCNC: 0.9 MG/DL
BUN SERPL-MCNC: 10 MG/DL
CALCIUM SERPL-MCNC: 10.3 MG/DL
CHLORIDE SERPL-SCNC: 106 MMOL/L
CO2 SERPL-SCNC: 28 MMOL/L
CREAT SERPL-MCNC: 1.35 MG/DL
EGFR: 39 ML/MIN/1.73M2
GLUCOSE SERPL-MCNC: 84 MG/DL
POTASSIUM SERPL-SCNC: 4.9 MMOL/L
PROT SERPL-MCNC: 7 G/DL
SODIUM SERPL-SCNC: 142 MMOL/L

## 2024-07-01 PROCEDURE — 99204 OFFICE O/P NEW MOD 45 MIN: CPT

## 2024-07-01 PROCEDURE — 99214 OFFICE O/P EST MOD 30 MIN: CPT

## 2024-08-08 ENCOUNTER — APPOINTMENT (OUTPATIENT)
Dept: MAMMOGRAPHY | Facility: IMAGING CENTER | Age: 84
End: 2024-08-08

## 2024-08-08 ENCOUNTER — APPOINTMENT (OUTPATIENT)
Dept: CT IMAGING | Facility: IMAGING CENTER | Age: 84
End: 2024-08-08

## 2024-08-08 ENCOUNTER — RESULT REVIEW (OUTPATIENT)
Age: 84
End: 2024-08-08

## 2024-08-08 ENCOUNTER — OUTPATIENT (OUTPATIENT)
Dept: OUTPATIENT SERVICES | Facility: HOSPITAL | Age: 84
LOS: 1 days | End: 2024-08-08
Payer: COMMERCIAL

## 2024-08-08 DIAGNOSIS — Z00.8 ENCOUNTER FOR OTHER GENERAL EXAMINATION: ICD-10-CM

## 2024-08-08 DIAGNOSIS — Z98.890 OTHER SPECIFIED POSTPROCEDURAL STATES: Chronic | ICD-10-CM

## 2024-08-08 DIAGNOSIS — C54.1 MALIGNANT NEOPLASM OF ENDOMETRIUM: ICD-10-CM

## 2024-08-08 DIAGNOSIS — Z98.51 TUBAL LIGATION STATUS: Chronic | ICD-10-CM

## 2024-08-08 PROCEDURE — 74177 CT ABD & PELVIS W/CONTRAST: CPT

## 2024-08-08 PROCEDURE — 77067 SCR MAMMO BI INCL CAD: CPT

## 2024-08-08 PROCEDURE — 77063 BREAST TOMOSYNTHESIS BI: CPT

## 2024-08-08 PROCEDURE — 71260 CT THORAX DX C+: CPT | Mod: 26

## 2024-08-08 PROCEDURE — 77067 SCR MAMMO BI INCL CAD: CPT | Mod: 26

## 2024-08-08 PROCEDURE — 77063 BREAST TOMOSYNTHESIS BI: CPT | Mod: 26

## 2024-08-08 PROCEDURE — 71260 CT THORAX DX C+: CPT

## 2024-08-08 PROCEDURE — 74177 CT ABD & PELVIS W/CONTRAST: CPT | Mod: 26

## 2024-08-21 ENCOUNTER — APPOINTMENT (OUTPATIENT)
Dept: VASCULAR SURGERY | Facility: CLINIC | Age: 84
End: 2024-08-21
Payer: MEDICARE

## 2024-08-21 VITALS
BODY MASS INDEX: 28.61 KG/M2 | DIASTOLIC BLOOD PRESSURE: 74 MMHG | HEIGHT: 66 IN | TEMPERATURE: 97.8 F | WEIGHT: 178 LBS | SYSTOLIC BLOOD PRESSURE: 205 MMHG | HEART RATE: 41 BPM

## 2024-08-21 PROCEDURE — 99213 OFFICE O/P EST LOW 20 MIN: CPT

## 2024-08-21 NOTE — PHYSICAL EXAM
[Respiratory Effort] : normal respiratory effort [No Rash or Lesion] : No rash or lesion [Alert] : alert [Oriented to Person] : oriented to person [Oriented to Place] : oriented to place [Oriented to Time] : oriented to time [JVD] : no jugular venous distention  [Abdomen Masses] : No abdominal masses [Abdomen Tenderness] : ~T ~M No abdominal tenderness [de-identified] : NAD

## 2024-08-21 NOTE — HISTORY OF PRESENT ILLNESS
[FreeTextEntry1] : 82 y/o woman w/ h/o endometrial cancer s/p RASHEEDA/BSO and colon ca s/p micki-colectomy s/p adjuvant Carbo/Taxol completed 2021 presents for evaluation of incidental finding of occlusive SMV thrombus found on routine CT scan on 4/8/2024.   Patient asymptomatic. No abdominal pain, N/V. Tolerating PO, normal bowel function. Patient was started on Eliquis. Repeat scan 8/8/2024 found to have persistent, unchanged SMV thrombus.

## 2024-08-21 NOTE — ASSESSMENT
[FreeTextEntry1] : 84 y/o woman w/ h/o endometrial cancer s/p RASHEEDA/BSO and colon ca s/p micki-colectomy s/p adjuvant Carbo/Taxol completed 2021 presents for evaluation of incidental finding of occlusive SMV thrombus found on routine CT scan on 4/8/2024. Patient was started on Eliquis. Repeat scan 8/8/2024 found to have persistent, unchanged SMV thrombus.   - Recommend minimum 6-month course of therapeutic anticoagulation  - CT scan 8/8/2024 w/ stable SMV thrombus. No indication for repeat scan for SMV thrombus surveillance  - Will discuss w/ hematology/oncology and patient/family regarding risk/benefits of long-term anticoagulation

## 2024-08-21 NOTE — REASON FOR VISIT
[Initial Evaluation] : an initial evaluation [Family Member] : family member [FreeTextEntry1] : SMV thrombosis

## 2024-10-21 ENCOUNTER — APPOINTMENT (OUTPATIENT)
Dept: GYNECOLOGIC ONCOLOGY | Facility: CLINIC | Age: 84
End: 2024-10-21
Payer: MEDICARE

## 2024-10-21 VITALS
WEIGHT: 182 LBS | DIASTOLIC BLOOD PRESSURE: 81 MMHG | TEMPERATURE: 98.1 F | BODY MASS INDEX: 29.25 KG/M2 | HEIGHT: 66 IN | HEART RATE: 52 BPM | SYSTOLIC BLOOD PRESSURE: 171 MMHG | RESPIRATION RATE: 16 BRPM

## 2024-10-21 DIAGNOSIS — C54.1 MALIGNANT NEOPLASM OF ENDOMETRIUM: ICD-10-CM

## 2024-10-21 PROCEDURE — 99214 OFFICE O/P EST MOD 30 MIN: CPT

## 2024-11-08 NOTE — H&P PST ADULT - ABORTIONS, OB PROFILE
Patient was called back and below information was discussed.  No questions or concerns at this time.     0 1

## 2024-12-30 ENCOUNTER — OUTPATIENT (OUTPATIENT)
Dept: OUTPATIENT SERVICES | Facility: HOSPITAL | Age: 84
LOS: 1 days | Discharge: HOME | End: 2024-12-30

## 2024-12-30 DIAGNOSIS — Z98.890 OTHER SPECIFIED POSTPROCEDURAL STATES: Chronic | ICD-10-CM

## 2024-12-30 DIAGNOSIS — D64.9 ANEMIA, UNSPECIFIED: ICD-10-CM

## 2024-12-30 DIAGNOSIS — Z98.51 TUBAL LIGATION STATUS: Chronic | ICD-10-CM

## 2025-01-06 ENCOUNTER — APPOINTMENT (OUTPATIENT)
Dept: HEMATOLOGY ONCOLOGY | Facility: CLINIC | Age: 85
End: 2025-01-06

## 2025-01-06 ENCOUNTER — NON-APPOINTMENT (OUTPATIENT)
Age: 85
End: 2025-01-06

## 2025-01-06 DIAGNOSIS — C54.1 MALIGNANT NEOPLASM OF ENDOMETRIUM: ICD-10-CM

## 2025-01-14 ENCOUNTER — NON-APPOINTMENT (OUTPATIENT)
Age: 85
End: 2025-01-14

## 2025-02-13 ENCOUNTER — OUTPATIENT (OUTPATIENT)
Dept: OUTPATIENT SERVICES | Facility: HOSPITAL | Age: 85
LOS: 1 days | End: 2025-02-13
Payer: COMMERCIAL

## 2025-02-13 ENCOUNTER — APPOINTMENT (OUTPATIENT)
Dept: CT IMAGING | Facility: IMAGING CENTER | Age: 85
End: 2025-02-13
Payer: MEDICARE

## 2025-02-13 ENCOUNTER — RESULT REVIEW (OUTPATIENT)
Age: 85
End: 2025-02-13

## 2025-02-13 DIAGNOSIS — Z98.51 TUBAL LIGATION STATUS: Chronic | ICD-10-CM

## 2025-02-13 DIAGNOSIS — C54.1 MALIGNANT NEOPLASM OF ENDOMETRIUM: ICD-10-CM

## 2025-02-13 DIAGNOSIS — Z98.890 OTHER SPECIFIED POSTPROCEDURAL STATES: Chronic | ICD-10-CM

## 2025-02-13 DIAGNOSIS — Z00.8 ENCOUNTER FOR OTHER GENERAL EXAMINATION: ICD-10-CM

## 2025-02-13 PROCEDURE — 74177 CT ABD & PELVIS W/CONTRAST: CPT

## 2025-02-13 PROCEDURE — 71260 CT THORAX DX C+: CPT | Mod: 26

## 2025-02-13 PROCEDURE — 71260 CT THORAX DX C+: CPT

## 2025-02-13 PROCEDURE — 74177 CT ABD & PELVIS W/CONTRAST: CPT | Mod: 26

## 2025-02-25 ENCOUNTER — APPOINTMENT (OUTPATIENT)
Dept: HEMATOLOGY ONCOLOGY | Facility: CLINIC | Age: 85
End: 2025-02-25
Payer: MEDICARE

## 2025-02-25 DIAGNOSIS — C54.1 MALIGNANT NEOPLASM OF ENDOMETRIUM: ICD-10-CM

## 2025-02-25 PROCEDURE — 99214 OFFICE O/P EST MOD 30 MIN: CPT | Mod: 2W

## 2025-04-25 ENCOUNTER — NON-APPOINTMENT (OUTPATIENT)
Age: 85
End: 2025-04-25

## 2025-04-25 ENCOUNTER — APPOINTMENT (OUTPATIENT)
Dept: GYNECOLOGIC ONCOLOGY | Facility: CLINIC | Age: 85
End: 2025-04-25
Payer: MEDICARE

## 2025-04-25 VITALS
OXYGEN SATURATION: 99 % | DIASTOLIC BLOOD PRESSURE: 90 MMHG | TEMPERATURE: 97.3 F | WEIGHT: 185 LBS | BODY MASS INDEX: 29.73 KG/M2 | SYSTOLIC BLOOD PRESSURE: 169 MMHG | HEART RATE: 81 BPM | HEIGHT: 66 IN

## 2025-04-25 DIAGNOSIS — N95.2 POSTMENOPAUSAL ATROPHIC VAGINITIS: ICD-10-CM

## 2025-04-25 DIAGNOSIS — N94.10 UNSPECIFIED DYSPAREUNIA: ICD-10-CM

## 2025-04-25 DIAGNOSIS — C54.1 MALIGNANT NEOPLASM OF ENDOMETRIUM: ICD-10-CM

## 2025-04-25 PROCEDURE — 99214 OFFICE O/P EST MOD 30 MIN: CPT

## 2025-06-04 ENCOUNTER — OUTPATIENT (OUTPATIENT)
Dept: OUTPATIENT SERVICES | Facility: HOSPITAL | Age: 85
LOS: 1 days | Discharge: HOME | End: 2025-06-04

## 2025-06-04 DIAGNOSIS — D64.9 ANEMIA, UNSPECIFIED: ICD-10-CM

## 2025-06-04 DIAGNOSIS — Z98.890 OTHER SPECIFIED POSTPROCEDURAL STATES: Chronic | ICD-10-CM

## 2025-06-04 DIAGNOSIS — Z98.51 TUBAL LIGATION STATUS: Chronic | ICD-10-CM

## 2025-06-05 ENCOUNTER — APPOINTMENT (OUTPATIENT)
Dept: HEMATOLOGY ONCOLOGY | Facility: CLINIC | Age: 85
End: 2025-06-05

## 2025-06-05 ENCOUNTER — RESULT REVIEW (OUTPATIENT)
Age: 85
End: 2025-06-05

## 2025-06-05 ENCOUNTER — TRANSCRIPTION ENCOUNTER (OUTPATIENT)
Age: 85
End: 2025-06-05

## 2025-06-06 LAB
ALBUMIN SERPL ELPH-MCNC: 4.2 G/DL
ALP BLD-CCNC: 139 U/L
ALT SERPL-CCNC: 26 U/L
ANION GAP SERPL CALC-SCNC: 11 MMOL/L
AST SERPL-CCNC: 34 U/L
BILIRUB SERPL-MCNC: 0.9 MG/DL
BUN SERPL-MCNC: 12 MG/DL
CALCIUM SERPL-MCNC: 10.5 MG/DL
CANCER AG125 SERPL-ACNC: 4 U/ML
CHLORIDE SERPL-SCNC: 105 MMOL/L
CO2 SERPL-SCNC: 26 MMOL/L
CREAT SERPL-MCNC: 1.21 MG/DL
EGFRCR SERPLBLD CKD-EPI 2021: 44 ML/MIN/1.73M2
GLUCOSE SERPL-MCNC: 101 MG/DL
MAGNESIUM SERPL-MCNC: 2.1 MG/DL
POTASSIUM SERPL-SCNC: 4.9 MMOL/L
PROT SERPL-MCNC: 6.9 G/DL
SODIUM SERPL-SCNC: 143 MMOL/L

## 2025-08-15 ENCOUNTER — APPOINTMENT (OUTPATIENT)
Dept: HEMATOLOGY ONCOLOGY | Facility: CLINIC | Age: 85
End: 2025-08-15
Payer: MEDICARE

## 2025-08-15 VITALS
WEIGHT: 180.78 LBS | BODY MASS INDEX: 29.18 KG/M2 | OXYGEN SATURATION: 98 % | DIASTOLIC BLOOD PRESSURE: 85 MMHG | RESPIRATION RATE: 16 BRPM | HEART RATE: 66 BPM | TEMPERATURE: 97.3 F | SYSTOLIC BLOOD PRESSURE: 168 MMHG

## 2025-08-15 DIAGNOSIS — C54.1 MALIGNANT NEOPLASM OF ENDOMETRIUM: ICD-10-CM

## 2025-08-15 PROCEDURE — 99213 OFFICE O/P EST LOW 20 MIN: CPT

## 2025-09-10 ENCOUNTER — APPOINTMENT (OUTPATIENT)
Dept: CT IMAGING | Facility: IMAGING CENTER | Age: 85
End: 2025-09-10
Payer: MEDICARE

## 2025-09-10 PROCEDURE — 74177 CT ABD & PELVIS W/CONTRAST: CPT | Mod: 26

## 2025-09-10 PROCEDURE — 71260 CT THORAX DX C+: CPT | Mod: 26
